# Patient Record
Sex: MALE | Race: WHITE | NOT HISPANIC OR LATINO | Employment: OTHER | URBAN - NONMETROPOLITAN AREA
[De-identification: names, ages, dates, MRNs, and addresses within clinical notes are randomized per-mention and may not be internally consistent; named-entity substitution may affect disease eponyms.]

---

## 2017-01-06 ENCOUNTER — OFFICE VISIT (OUTPATIENT)
Dept: UROLOGY | Facility: CLINIC | Age: 72
End: 2017-01-06

## 2017-01-06 VITALS
OXYGEN SATURATION: 96 % | RESPIRATION RATE: 16 BRPM | DIASTOLIC BLOOD PRESSURE: 87 MMHG | TEMPERATURE: 98 F | HEART RATE: 89 BPM | SYSTOLIC BLOOD PRESSURE: 128 MMHG

## 2017-01-06 DIAGNOSIS — C61 PROSTATE CANCER (HCC): Primary | ICD-10-CM

## 2017-01-06 LAB
BILIRUB BLD-MCNC: NEGATIVE MG/DL
CLARITY, POC: CLEAR
COLOR UR: YELLOW
GLUCOSE UR STRIP-MCNC: NEGATIVE MG/DL
KETONES UR QL: NEGATIVE
LEUKOCYTE EST, POC: NEGATIVE
NITRITE UR-MCNC: NEGATIVE MG/ML
PH UR: 6 [PH] (ref 5–8)
PROT UR STRIP-MCNC: NEGATIVE MG/DL
RBC # UR STRIP: NEGATIVE /UL
SP GR UR: 1.01 (ref 1–1.03)
UROBILINOGEN UR QL: NORMAL

## 2017-01-06 PROCEDURE — 99213 OFFICE O/P EST LOW 20 MIN: CPT | Performed by: UROLOGY

## 2017-01-06 PROCEDURE — 81003 URINALYSIS AUTO W/O SCOPE: CPT | Performed by: UROLOGY

## 2017-01-06 NOTE — PROGRESS NOTES
Chief Complaint  Prostate Cancer (Patient is here for a 6 month fup.)        TIFFANY Berg is a 71 y.o. male who returns today for semiannual follow-up with a known history of prostate cancer that is low-grade and diagnosed at an early stage and is untreated but is being managed with a program of active surveillance.  He has not had the routine follow-up biopsy gun but after taking Proscar to shrink the gland is PSA has dropped from 10-1.  He is always had a normal digital rectal exam and currently has minimal trouble voiding on Proscar and Flomax.  He does have some urgency associated with drinking large amounts of tea with caffeine.  I suggested reducing this rather than taking medication for her overactive bladder.    Vitals:    01/06/17 0900   BP: 128/87   Pulse: 89   Resp: 16   Temp: 98 °F (36.7 °C)   SpO2: 96%       Past Medical History  Past Medical History   Diagnosis Date   • Abnormal menses    • Acute asthma exacerbation    • Acute sinusitis    • Acute URI    • Allergic rhinitis    • Anemia    • Asthma    • Benign prostatic hypertrophy    • Deafness    • ED (erectile dysfunction)    • Family history of deafness and hearing loss    • GERD (gastroesophageal reflux disease)    • Hyperlipidemia    • Hypertension    • Obstructive sleep apnea    • Peripheral neuropathy    • Short of breath on exertion    • Skin cancer    • Type 2 diabetes mellitus    • Urinary frequency    • Vitamin B 12 deficiency    • Vitamin D deficiency        Past Surgical History  Past Surgical History   Procedure Laterality Date   • Appendectomy     • Knee arthroscopy Left    • Hand surgery         Medications  has a current medication list which includes the following prescription(s): accu-chek softclix lancets, albuterol, b-d single use swabs regular, alfuzosin, aspirin, blood gluc meter disp-strips, surechek control solution, carvedilol, vitamin d, chromium-cinnamon, cyanocobalamin, finasteride, glimepiride, glucosamine-chondroit-vit  c-mn, glucosamine-chondroitin, glucose blood, glucose blood, lisinopril, metformin, mirabegron er, multivitamin gummies adults, omeprazole, pneumococcal conj. 13-valent, sitagliptin, tamsulosin, terbinafine, welchol, and zoster vaccine live pf.      Allergies  Allergies   Allergen Reactions   • Statins Myalgia       Social History  Social History     Social History Narrative       Family History  He has no family history of bladder or kidney cancer  He has no family history of kidney stones      AUA Symptom Score:      Review of Systems  Review of Systems    Physical Exam  Physical Exam   Constitutional: He is oriented to person, place, and time. He appears well-developed and well-nourished.   HENT:   Head: Normocephalic and atraumatic.   Neck: Normal range of motion.   Pulmonary/Chest: Effort normal. No respiratory distress.   Abdominal: Soft. He exhibits no distension and no mass. There is no tenderness. No hernia.   Genitourinary: Rectum normal and prostate normal.   Musculoskeletal: Normal range of motion.   Lymphadenopathy:     He has no cervical adenopathy.   Neurological: He is alert and oriented to person, place, and time.   Skin: Skin is warm.   Psychiatric: He has a normal mood and affect. His behavior is normal.   Vitals reviewed.      Labs Recent and today in the office:  Results for orders placed or performed in visit on 01/06/17   POC Urinalysis Dipstick, Automated   Result Value Ref Range    Color Yellow Yellow, Straw, Dark Yellow, Mary    Clarity, UA Clear Clear    Glucose, UA Negative Negative, 1000 mg/dL (3+) mg/dL    Bilirubin Negative Negative    Ketones, UA Negative Negative    Specific Gravity  1.010 1.005 - 1.030    Blood, UA Negative Negative    pH, Urine 6.0 5.0 - 8.0    Protein, POC Negative Negative mg/dL    Urobilinogen, UA Normal Normal    Leukocytes Negative Negative    Nitrite, UA Negative Negative         Assessment & Plan  He'll reduce his caffeine continue the Proscar and Flomax  and return in 6 months with PSA.

## 2017-01-06 NOTE — MR AVS SNAPSHOT
Fazal Berg   1/6/2017 9:00 AM   Office Visit    Dept Phone:  [phone]   Encounter #:  [id number]    Provider:  Jeremy Lundberg MD   Department:  [facility] UROLOGY                Your Full Care Plan              Your Updated Medication List          This list is accurate as of: 1/6/17  9:22 AM.  Always use your most recent med list.                ACCU-CHEK SOFTCLIX LANCETS lancets   1 each by Other route 3 (three) times a day. Use as instructed       alfuzosin 10 MG 24 hr tablet   Commonly known as:  UROXATRAL       aspirin 81 MG EC tablet       B-D SINGLE USE SWABS REGULAR pads   1 swab 3 (three) times a day.       Blood Gluc Meter Disp-Strips device   1 strip 3 (three) times a day. Patient needs Accu-chek meter and test strips.       carvedilol 25 MG tablet   Commonly known as:  COREG   Take 1 tablet by mouth 2 (Two) Times a Day With Meals.       CINNAMON PLUS CHROMIUM 100-500 MCG-MG capsule   Generic drug:  Chromium-Cinnamon       Cyanocobalamin 2500 MCG sublingual tablet       finasteride 5 MG tablet   Commonly known as:  PROSCAR   Take 1 tablet by mouth daily.       glimepiride 4 MG tablet   Commonly known as:  AMARYL       GLUCOSAMINE 1500 COMPLEX PO       glucosamine-chondroitin 500-400 MG capsule capsule       * glucose blood test strip   1 each by Other route 3 (three) times a day. Use as instructed       * glucose blood test strip   Commonly known as:  ACCU-CHEK IDANIA   1 each by Other route 3 (three) times a day. Use as instructed       lisinopril 5 MG tablet   Commonly known as:  PRINIVIL,ZESTRIL       metFORMIN 500 MG tablet   Commonly known as:  GLUCOPHAGE   TAKE 1/2 TABLET BY MOUTH EVERY TWELVE HOURS       MULTIVITAMIN GUMMIES ADULTS chewable tablet       MYRBETRIQ 50 MG tablet sustained-release 24 hour   Generic drug:  Mirabegron ER       Omeprazole 20 MG tablet delayed-release       PREVNAR 13 vaccine   Generic drug:  pneumococcal conj. 13-valent        SITagliptin 100 MG tablet   Commonly known as:  JANUVIA   Take 1 tablet by mouth Daily.       SURECHEK CONTROL SOLUTION NORMAL liquid   1 bottle by In Vitro route every 30 (thirty) days.       tamsulosin 0.4 MG capsule 24 hr capsule   Commonly known as:  FLOMAX       terbinafine 250 MG tablet   Commonly known as:  LAMISIL   Take 1 tablet by mouth daily.       VENTOLIN  (90 BASE) MCG/ACT inhaler   Generic drug:  albuterol       Vitamin D 2000 UNITS tablet       WELCHOL 625 MG tablet   Generic drug:  colesevelam   TAKE 2 TABLETS TWICE DAILY       ZOSTAVAX 56818 UNT/0.65ML injection   Generic drug:  zoster vaccine live PF       * Notice:  This list has 2 medication(s) that are the same as other medications prescribed for you. Read the directions carefully, and ask your doctor or other care provider to review them with you.            We Performed the Following     POC Urinalysis Dipstick, Automated       You Were Diagnosed With        Codes Comments    Prostate cancer    -  Primary ICD-10-CM: C61  ICD-9-CM: 185       Instructions     None    Patient Instructions History      Upcoming Appointments     Visit Type Date Time Department    FOLLOW UP 2017  9:00 AM [facility]    OFFICE VISIT 2017  8:00 AM [facility]      [employer] Signup     [facility] [employer] allows you to send messages to your doctor, view your test results, renew your prescriptions, schedule appointments, and more. To sign up, go to [employer] and click on the Sign Up Now link in the New User? box. Enter your [employer] Activation Code exactly as it appears below along with the last four digits of your Social Security Number and your Date of Birth () to complete the sign-up process. If you do not sign up before the expiration date, you must request a new code.    [employer] Activation Code: [email or web address]  Expires: 2017  9:22 AM    If you have questions, you can email  [email or web address] or call [phone] to talk to our MyChart staff. Remember, [employer] is NOT to be used for urgent needs. For medical emergencies, dial 911.               Other Info from Your Visit           Your Appointments     Feb 08, 2017  8:00 AM EST   Office Visit with Matthew Carr MD   [facility] PRIMARY [facility] (--)    [address]  [place] [address] [phone]           Arrive 15 minutes prior to appointment.              Allergies     Statins  Myalgia      Reason for Visit     Prostate Cancer Patient is here for a 6 month fup.      Vital Signs     Blood Pressure Pulse Temperature Respirations Oxygen Saturation Smoking Status    128/87 89 98 °F (36.7 °C) (Temporal Artery ) 16 96% Never Smoker      Problems and Diagnoses Noted     Prostate cancer    -  Primary      Results     POC Urinalysis Dipstick, Automated      Component Value Standard Range & Units    Color Yellow Yellow, Straw, Dark Yellow, Mary    Clarity, UA Clear Clear    Glucose, UA Negative Negative, 1000 mg/dL (3+) mg/dL    Bilirubin Negative Negative    Ketones, UA Negative Negative    Specific Gravity  1.010 1.005 - 1.030    Blood, UA Negative Negative    pH, Urine 6.0 5.0 - 8.0    Protein, POC Negative Negative mg/dL    Urobilinogen, UA Normal Normal    Leukocytes Negative Negative    Nitrite, UA Negative Negative

## 2017-02-01 ENCOUNTER — LAB (OUTPATIENT)
Dept: FAMILY MEDICINE CLINIC | Facility: CLINIC | Age: 72
End: 2017-02-01

## 2017-02-01 DIAGNOSIS — E11.42 TYPE 2 DIABETES, CONTROLLED, WITH PERIPHERAL NEUROPATHY (HCC): ICD-10-CM

## 2017-02-01 DIAGNOSIS — I10 ESSENTIAL HYPERTENSION: ICD-10-CM

## 2017-02-01 DIAGNOSIS — R53.83 OTHER FATIGUE: ICD-10-CM

## 2017-02-01 DIAGNOSIS — R53.81 MALAISE: ICD-10-CM

## 2017-02-01 DIAGNOSIS — Z79.899 MEDICATION MANAGEMENT: ICD-10-CM

## 2017-02-01 DIAGNOSIS — E78.5 HYPERLIPIDEMIA, UNSPECIFIED HYPERLIPIDEMIA TYPE: Primary | ICD-10-CM

## 2017-02-01 DIAGNOSIS — E55.9 VITAMIN D DEFICIENCY: ICD-10-CM

## 2017-02-02 LAB
25(OH)D3+25(OH)D2 SERPL-MCNC: 29 NG/ML
ALBUMIN SERPL-MCNC: 4.3 G/DL (ref 3.2–4.8)
ALBUMIN/GLOB SERPL: 1.6 G/DL (ref 1.5–2.5)
ALP SERPL-CCNC: 50 U/L (ref 25–100)
ALT SERPL-CCNC: 18 U/L (ref 7–40)
AST SERPL-CCNC: 17 U/L (ref 0–33)
BILIRUB SERPL-MCNC: 0.8 MG/DL (ref 0.3–1.2)
BUN SERPL-MCNC: 19 MG/DL (ref 9–23)
BUN/CREAT SERPL: 17.3 (ref 7–25)
CALCIUM SERPL-MCNC: 10.6 MG/DL (ref 8.7–10.4)
CHLORIDE SERPL-SCNC: 100 MMOL/L (ref 99–109)
CHOLEST SERPL-MCNC: 209 MG/DL (ref 0–200)
CO2 SERPL-SCNC: 29 MMOL/L (ref 20–31)
CREAT SERPL-MCNC: 1.1 MG/DL (ref 0.6–1.3)
GLOBULIN SER CALC-MCNC: 2.7 GM/DL
GLUCOSE SERPL-MCNC: 160 MG/DL (ref 70–100)
HAV IGM SERPL QL IA: NEGATIVE
HBA1C MFR BLD: 8.5 % (ref 4.8–5.6)
HBV CORE IGM SERPL QL IA: NEGATIVE
HBV SURFACE AG SERPL QL IA: NEGATIVE
HCV AB S/CO SERPL IA: <0.1 S/CO RATIO (ref 0–0.9)
HDLC SERPL-MCNC: 42 MG/DL (ref 40–60)
LDLC SERPL CALC-MCNC: 101 MG/DL (ref 0–100)
MICROALBUMIN UR-MCNC: 17.5 UG/ML
POTASSIUM SERPL-SCNC: 4.7 MMOL/L (ref 3.5–5.5)
PROT SERPL-MCNC: 7 G/DL (ref 5.7–8.2)
SODIUM SERPL-SCNC: 138 MMOL/L (ref 132–146)
TRIGL SERPL-MCNC: 331 MG/DL (ref 0–150)
VLDLC SERPL CALC-MCNC: 66.2 MG/DL

## 2017-02-08 ENCOUNTER — OFFICE VISIT (OUTPATIENT)
Dept: FAMILY MEDICINE CLINIC | Facility: CLINIC | Age: 72
End: 2017-02-08

## 2017-02-08 VITALS
DIASTOLIC BLOOD PRESSURE: 87 MMHG | BODY MASS INDEX: 32.06 KG/M2 | SYSTOLIC BLOOD PRESSURE: 139 MMHG | TEMPERATURE: 97.8 F | WEIGHT: 229 LBS | HEIGHT: 71 IN | HEART RATE: 61 BPM | RESPIRATION RATE: 16 BRPM | OXYGEN SATURATION: 97 %

## 2017-02-08 DIAGNOSIS — E78.5 HYPERLIPIDEMIA, UNSPECIFIED HYPERLIPIDEMIA TYPE: ICD-10-CM

## 2017-02-08 DIAGNOSIS — G47.33 OBSTRUCTIVE SLEEP APNEA SYNDROME: ICD-10-CM

## 2017-02-08 DIAGNOSIS — E11.42 TYPE 2 DIABETES, CONTROLLED, WITH PERIPHERAL NEUROPATHY (HCC): ICD-10-CM

## 2017-02-08 DIAGNOSIS — J45.20 MILD INTERMITTENT ASTHMA WITHOUT COMPLICATION: ICD-10-CM

## 2017-02-08 DIAGNOSIS — I10 ESSENTIAL HYPERTENSION: Primary | ICD-10-CM

## 2017-02-08 PROCEDURE — 99214 OFFICE O/P EST MOD 30 MIN: CPT | Performed by: INTERNAL MEDICINE

## 2017-02-08 NOTE — PROGRESS NOTES
"Subjective   Patient ID: Fazal Berg is a 71 y.o. male Pt is here for management of multiple medical problems.  History of Present Illness  Pt is here for management of multiple medical problems.      Feel well. Breathing well.   Tolerating meds well.       The following portions of the patient's history were reviewed and updated as appropriate: allergies, current medications, past family history, past medical history, past social history, past surgical history and problem list.  Review of Systems   Constitutional: Negative for fatigue and fever.   HENT: Positive for congestion.    Respiratory: Negative for shortness of breath and wheezing.    All other systems reviewed and are negative.      Objective     Visit Vitals   • /87 (BP Location: Left arm, Patient Position: Sitting, Cuff Size: Adult)   • Pulse 61   • Temp 97.8 °F (36.6 °C) (Oral)   • Resp 16   • Ht 71\" (180.3 cm)   • Wt 229 lb (104 kg)   • SpO2 97%   • BMI 31.94 kg/m2     Physical Exam  General Appearance:    Alert, cooperative, no distress, appears stated age   Head:    Normocephalic, without obvious abnormality, atraumatic   Eyes:    PERRL, conjunctiva/corneas clear, EOM's intact           Ears:    Normal TM's and external ear canals, both ears   Nose:   Nares normal, septum midline, mucosa normal, no drainage    or sinus tenderness   Throat:   Narrowed oral air way. Lips, mucosa, and tongue normal; teeth and gums normal   Neck:   Supple, symmetrical, trachea midline, no adenopathy;        thyroid:  No enlargement/tenderness/nodules; no carotid    bruit or JVD   Back:     Symmetric, no curvature, ROM normal, no CVA tenderness   Lungs:     Clear to auscultation bilaterally, respirations unlabored   Chest wall:    No tenderness or deformity   Heart:    Regular rate and rhythm, S1 and S2 normal, no murmur, rub   or gallop   Abdomen:     Soft, non-tender, bowel sounds active all four quadrants,     no masses, no organomegaly           Extremities: "   Extremities normal, atraumatic, no cyanosis or edema   Pulses:   2+ and symmetric all extremities   Skin:   Skin color, texture, turgor normal, no rashes or lesions   Lymph nodes:   Cervical, supraclavicular, and axillary nodes normal   Neurologic:   CNII-XII intact. Normal strength, sensation and reflexes       throughout       Assessment/Plan     dmt2 worsening. Poor diet control and lack of exercise. Pt low motivation today to get going.   Memory get worse.   Pt states he cannot comply with treatment of gregory.      Fazal was seen today for follow-up.    Diagnoses and all orders for this visit:    Essential hypertension    Hyperlipidemia, unspecified hyperlipidemia type    Mild intermittent asthma without complication    Type 2 diabetes, controlled, with peripheral neuropathy    Other orders  -     metFORMIN (GLUCOPHAGE) 500 MG tablet; Take 1 tablet by mouth 2 (Two) Times a Day With Meals.      Return in about 4 months (around 6/8/2017).                   Patient Instructions   [facility] at [facility]

## 2017-03-14 RX ORDER — LISINOPRIL AND HYDROCHLOROTHIAZIDE 12.5; 1 MG/1; MG/1
TABLET ORAL
Qty: 90 TABLET | Refills: 3 | Status: SHIPPED | OUTPATIENT
Start: 2017-03-14 | End: 2018-02-07 | Stop reason: SDUPTHER

## 2017-03-14 RX ORDER — GLIMEPIRIDE 4 MG/1
TABLET ORAL
Qty: 90 TABLET | Refills: 3 | Status: SHIPPED | OUTPATIENT
Start: 2017-03-14 | End: 2018-04-24 | Stop reason: SDUPTHER

## 2017-06-01 LAB
25(OH)D3+25(OH)D2 SERPL-MCNC: 34.2 NG/ML
CHOLEST SERPL-MCNC: 194 MG/DL (ref 0–199)
HBA1C MFR BLD: 6.5 %
HDLC SERPL-MCNC: 49 MG/DL (ref 40–60)
LDLC SERPL CALC-MCNC: 83 MG/DL (ref 0–99)
TRIGL SERPL-MCNC: 310 MG/DL
VLDLC SERPL CALC-MCNC: 62 MG/DL

## 2017-06-09 ENCOUNTER — OFFICE VISIT (OUTPATIENT)
Dept: FAMILY MEDICINE CLINIC | Facility: CLINIC | Age: 72
End: 2017-06-09

## 2017-06-09 VITALS
DIASTOLIC BLOOD PRESSURE: 66 MMHG | RESPIRATION RATE: 16 BRPM | TEMPERATURE: 98.1 F | OXYGEN SATURATION: 99 % | HEIGHT: 71 IN | SYSTOLIC BLOOD PRESSURE: 143 MMHG | BODY MASS INDEX: 31.92 KG/M2 | HEART RATE: 60 BPM | WEIGHT: 228 LBS

## 2017-06-09 DIAGNOSIS — I10 ESSENTIAL HYPERTENSION: ICD-10-CM

## 2017-06-09 DIAGNOSIS — E78.5 HYPERLIPIDEMIA, UNSPECIFIED HYPERLIPIDEMIA TYPE: ICD-10-CM

## 2017-06-09 DIAGNOSIS — E11.42 TYPE 2 DIABETES, CONTROLLED, WITH PERIPHERAL NEUROPATHY (HCC): ICD-10-CM

## 2017-06-09 DIAGNOSIS — Z12.5 PROSTATE CANCER SCREENING: Primary | ICD-10-CM

## 2017-06-09 DIAGNOSIS — N42.9 DISORDER OF PROSTATE: ICD-10-CM

## 2017-06-09 PROCEDURE — G0438 PPPS, INITIAL VISIT: HCPCS | Performed by: INTERNAL MEDICINE

## 2017-06-09 PROCEDURE — 96160 PT-FOCUSED HLTH RISK ASSMT: CPT | Performed by: INTERNAL MEDICINE

## 2017-06-09 PROCEDURE — 99213 OFFICE O/P EST LOW 20 MIN: CPT | Performed by: INTERNAL MEDICINE

## 2017-06-09 PROCEDURE — 99397 PER PM REEVAL EST PAT 65+ YR: CPT | Performed by: INTERNAL MEDICINE

## 2017-06-09 NOTE — PROGRESS NOTES
"Subjective   Patient ID: Fazal Berg is a 72 y.o. male Pt is here for management of multiple medical problems.    Chief Complaint   Patient presents with   • Hypertension     4 month follow-up   • Dizziness     patient has dizziness at night when going to bed   • Sinusitis     patient states he thinks he has a sinus infection, he has had a runny nose, headacheand sinus pressure       History of Present Illness    Feels well.   Didn't take meds this am.       occ dizziness with laying down in bed. Thinks it is sinus related.      The following portions of the patient's history were reviewed and updated as appropriate: allergies, current medications, past family history, past medical history, past social history, past surgical history and problem list.      Review of Systems   Constitutional: Positive for fatigue.   Neurological: Positive for dizziness.   All other systems reviewed and are negative.      Objective     /66 (BP Location: Left arm, Patient Position: Sitting, Cuff Size: Large Adult)  Pulse 60  Temp 98.1 °F (36.7 °C) (Oral)   Resp 16  Ht 71\" (180.3 cm)  Wt 228 lb (103 kg)  SpO2 99%  BMI 31.8 kg/m2  Physical Exam  General Appearance:    Alert, cooperative, no distress, appears stated age   Head:    Normocephalic, without obvious abnormality, atraumatic   Eyes:    PERRL, conjunctiva/corneas clear, EOM's intact           Ears:    Normal TM's and external ear canals, both ears   Nose:   Nares normal, septum midline, mucosa normal, no drainage    or sinus tenderness   Throat:   Lips, mucosa, and tongue normal; teeth and gums normal   Neck:   Supple, symmetrical, trachea midline, no adenopathy;        thyroid:  No enlargement/tenderness/nodules; no carotid    bruit or JVD   Back:     Symmetric, no curvature, ROM normal, no CVA tenderness   Lungs:     Clear to auscultation bilaterally, respirations unlabored   Chest wall:    No tenderness or deformity   Heart:    Regular rate and rhythm, S1 and " S2 normal, no murmur, rub   or gallop   Abdomen:     Soft, non-tender, bowel sounds active all four quadrants,     no masses, no organomegaly           Extremities:   Extremities normal, atraumatic, no cyanosis or edema   Pulses:   2+ and symmetric all extremities   Skin:   Skin color, texture, turgor normal, no rashes or lesions   Lymph nodes:   Cervical, supraclavicular, and axillary nodes normal   Neurologic:   CNII-XII intact. Normal strength, sensation and reflexes       throughout       Assessment/Plan       ha1c much improved.     Labs discussed.      Fazal was seen today for hypertension, dizziness and sinusitis.    Diagnoses and all orders for this visit:    Prostate cancer screening  -     PSA  -     Hemoglobin A1c  -     TSH  -     T4, Free  -     Hemoglobin A1c  -     Comprehensive Metabolic Panel  -     CBC & Differential    Type 2 diabetes, controlled, with peripheral neuropathy  -     PSA  -     Hemoglobin A1c  -     TSH  -     T4, Free  -     Hemoglobin A1c  -     Comprehensive Metabolic Panel  -     CBC & Differential    Essential hypertension  -     PSA  -     Hemoglobin A1c  -     TSH  -     T4, Free  -     Hemoglobin A1c  -     Comprehensive Metabolic Panel  -     CBC & Differential    Hyperlipidemia, unspecified hyperlipidemia type  -     PSA  -     Hemoglobin A1c  -     TSH  -     T4, Free  -     Hemoglobin A1c  -     Comprehensive Metabolic Panel  -     CBC & Differential    Disorder of prostate   -     PSA      Return in about 5 months (around 11/9/2017).                   There are no Patient Instructions on file for this visit.

## 2017-06-09 NOTE — PROGRESS NOTES
QUICK REFERENCE INFORMATION:  The ABCs of the Annual Wellness Visit    Initial Medicare Wellness Visit    HEALTH RISK ASSESSMENT    1945    Recent Hospitalizations:  No hospitalization(s) within the last year..        Current Medical Providers:  Patient Care Team:  Matthew Carr MD as PCP - General        Smoking Status:  History   Smoking Status   • Never Smoker   Smokeless Tobacco   • Never Used       Alcohol Consumption:  History   Alcohol Use No       Depression Screen:   No flowsheet data found.    Health Habits and Functional and Cognitive Screening:  No flowsheet data found.               Does the patient have evidence of cognitive impairment? No    Asiprin use counseling: Taking ASA appropriately as indicated      Recent Lab Results:    Visual Acuity:  No exam data present    Age-appropriate Screening Schedule:  Refer to the list below for future screening recommendations based on patient's age, sex and/or medical conditions. Orders for these recommended tests are listed in the plan section. The patient has been provided with a written plan.    Health Maintenance   Topic Date Due   • PNEUMOCOCCAL VACCINES (65+ LOW/MEDIUM RISK) (2 of 2 - PPSV23) 10/11/2016   • INFLUENZA VACCINE  08/01/2017   • DIABETIC FOOT EXAM  09/08/2017   • DIABETIC EYE EXAM  10/07/2017   • HEMOGLOBIN A1C  12/01/2017   • URINE MICROALBUMIN  02/01/2018   • LIPID PANEL  06/01/2018   • COLONOSCOPY  11/07/2026   • TDAP/TD VACCINES (2 - Td) 02/09/2027   • ZOSTER VACCINE  Completed        Subjective   History of Present Illness    Fazal Berg is a 72 y.o. male who presents for an Annual Wellness Visit.    The following portions of the patient's history were reviewed and updated as appropriate: allergies, current medications, past family history, past medical history, past social history, past surgical history and problem list.    Outpatient Medications Prior to Visit   Medication Sig Dispense Refill   • ACCU-CHEK SOFTCLIX LANCETS  lancets 1 each by Other route 3 (three) times a day. Use as instructed 300 each 3   • albuterol (VENTOLIN HFA) 108 (90 BASE) MCG/ACT inhaler Ventolin  (90 Base) MCG/ACT Inhalation Aerosol Solution; Patient Sig: Ventolin  (90 Base) MCG/ACT Inhalation Aerosol Solution INHALE 1 TO 2 PUFFS EVERY 4 TO 6 HOURS AS NEEDED.; 1; 11; 18-Aug-2015; Active     • Alcohol Swabs (B-D SINGLE USE SWABS REGULAR) pads 1 swab 3 (three) times a day. 90 each 3   • alfuzosin (UROXATRAL) 10 MG 24 hr tablet Take  by mouth.     • aspirin 81 MG EC tablet Take  by mouth daily.     • Blood Gluc Meter Disp-Strips device 1 strip 3 (three) times a day. Patient needs Accu-chek meter and test strips. 1 each 0   • Blood Glucose Calibration (SURECHEK CONTROL SOLUTION) NORMAL liquid 1 bottle by In Vitro route every 30 (thirty) days. 3 each 3   • carvedilol (COREG) 25 MG tablet Take 1 tablet by mouth 2 (Two) Times a Day With Meals. 180 tablet 3   • Cholecalciferol (VITAMIN D) 2000 UNITS tablet Take 1 capsule by mouth 3 (Three) Times a Week.     • Chromium-Cinnamon (CINNAMON PLUS CHROMIUM) 100-500 MCG-MG capsule Take 2 capsules by mouth 2 (two) times a day.     • Cyanocobalamin 2500 MCG sublingual tablet Place  under the tongue 3 (Three) Times a Week.     • finasteride (PROSCAR) 5 MG tablet Take 1 tablet by mouth daily. 90 tablet 3   • glimepiride (AMARYL) 4 MG tablet TAKE 1 TABLET EVERY DAY 90 tablet 3   • Glucosamine-Chondroit-Vit C-Mn (GLUCOSAMINE 1500 COMPLEX PO) Take  by mouth daily.     • glucosamine-chondroitin 500-400 MG capsule capsule Take  by mouth 3 (three) times a day with meals.     • lisinopril-hydrochlorothiazide (PRINZIDE,ZESTORETIC) 10-12.5 MG per tablet TAKE 1 TABLET EVERY DAY 90 tablet 3   • metFORMIN (GLUCOPHAGE) 500 MG tablet Take 1 tablet by mouth 2 (Two) Times a Day With Meals. 60 tablet 10   • Mirabegron ER (MYRBETRIQ) 50 MG tablet sustained-release 24 hour Take  by mouth.     • Multiple Vitamins-Minerals  (MULTIVITAMIN GUMMIES ADULTS) chewable tablet Chew daily.     • Omeprazole 20 MG tablet delayed-release Take  by mouth.     • SITagliptin (JANUVIA) 100 MG tablet Take 1 tablet by mouth Daily. 28 tablet 0   • tamsulosin (FLOMAX) 0.4 MG capsule 24 hr capsule Take 1 capsule by mouth.     • terbinafine (LAMISIL) 250 MG tablet Take 1 tablet by mouth daily. 30 tablet 2   • WELCHOL 625 MG tablet TAKE 2 TABLETS TWICE DAILY 360 tablet 3   • glucose blood (ACCU-CHEK IDANIA) test strip 1 each by Other route 3 (three) times a day. Use as instructed 90 each 3   • glucose blood test strip 1 each by Other route 3 (three) times a day. Use as instructed 300 each 3   • lisinopril (PRINIVIL,ZESTRIL) 5 MG tablet Take 5 mg by mouth daily.       No facility-administered medications prior to visit.        Patient Active Problem List   Diagnosis   • Menstrual disorder   • Asthma with acute exacerbation   • Acute sinusitis   • Acute upper respiratory infection   • Asthma   • Benign prostatic hyperplasia   • Gastroesophageal reflux disease   • Hyperlipidemia   • Hypertension   • Obstructive sleep apnea syndrome   • Peripheral neuropathy   • Dyspnea on exertion   • Malignant neoplasm of skin   • Type 2 diabetes, controlled, with peripheral neuropathy   • Cobalamin deficiency   • Vitamin D deficiency   • Skin lesion of face   • Arthritis       Advance Care Planning:  has an advance directive - a copy HAS NOT been provided. Have asked the patient to send this to us to add to record.    Identification of Risk Factors:  Risk factors include: weight , inactivity, increased fall risk and polypharmacy.    Review of Systems    Compared to one year ago, the patient feels his physical health is the same.  Compared to one year ago, the patient feels his mental health is the same.    Objective     Physical Exam    Vitals:    06/09/17 0817   BP: 143/66   BP Location: Left arm   Patient Position: Sitting   Cuff Size: Large Adult   Pulse: 60   Resp: 16  "  Temp: 98.1 °F (36.7 °C)   TempSrc: Oral   SpO2: 99%   Weight: 228 lb (103 kg)   Height: 71\" (180.3 cm)       Body mass index is 31.8 kg/(m^2).  Discussed the patient's BMI with him. The BMI is above average; BMI management plan is completed.    Assessment/Plan   Patient Self-Management and Personalized Health Advice  The patient has been provided with information about: diet and preventive services including:   · Advance directive, Fall Risk assessment done, Fall Risk plan of care done, Prostate cancer screening discussed.    Visit Diagnoses:    ICD-10-CM ICD-9-CM   1. Prostate cancer screening Z12.5 V76.44   2. Type 2 diabetes, controlled, with peripheral neuropathy E11.42 250.60     357.2   3. Essential hypertension I10 401.9   4. Hyperlipidemia, unspecified hyperlipidemia type E78.5 272.4   5. Disorder of prostate  N42.9 602.9       Orders Placed This Encounter   Procedures   • PSA   • Hemoglobin A1c   • TSH   • T4, Free   • Hemoglobin A1c   • Comprehensive Metabolic Panel   • CBC & Differential     Order Specific Question:   Manual Differential     Answer:   No       Outpatient Encounter Prescriptions as of 6/9/2017   Medication Sig Dispense Refill   • ACCU-CHEK SOFTCLIX LANCETS lancets 1 each by Other route 3 (three) times a day. Use as instructed 300 each 3   • albuterol (VENTOLIN HFA) 108 (90 BASE) MCG/ACT inhaler Ventolin  (90 Base) MCG/ACT Inhalation Aerosol Solution; Patient Sig: Ventolin  (90 Base) MCG/ACT Inhalation Aerosol Solution INHALE 1 TO 2 PUFFS EVERY 4 TO 6 HOURS AS NEEDED.; 1; 11; 18-Aug-2015; Active     • Alcohol Swabs (B-D SINGLE USE SWABS REGULAR) pads 1 swab 3 (three) times a day. 90 each 3   • alfuzosin (UROXATRAL) 10 MG 24 hr tablet Take  by mouth.     • aspirin 81 MG EC tablet Take  by mouth daily.     • Blood Gluc Meter Disp-Strips device 1 strip 3 (three) times a day. Patient needs Accu-chek meter and test strips. 1 each 0   • Blood Glucose Calibration (SURECHEK CONTROL " SOLUTION) NORMAL liquid 1 bottle by In Vitro route every 30 (thirty) days. 3 each 3   • carvedilol (COREG) 25 MG tablet Take 1 tablet by mouth 2 (Two) Times a Day With Meals. 180 tablet 3   • Cholecalciferol (VITAMIN D) 2000 UNITS tablet Take 1 capsule by mouth 3 (Three) Times a Week.     • Chromium-Cinnamon (CINNAMON PLUS CHROMIUM) 100-500 MCG-MG capsule Take 2 capsules by mouth 2 (two) times a day.     • Cyanocobalamin 2500 MCG sublingual tablet Place  under the tongue 3 (Three) Times a Week.     • finasteride (PROSCAR) 5 MG tablet Take 1 tablet by mouth daily. 90 tablet 3   • glimepiride (AMARYL) 4 MG tablet TAKE 1 TABLET EVERY DAY 90 tablet 3   • Glucosamine-Chondroit-Vit C-Mn (GLUCOSAMINE 1500 COMPLEX PO) Take  by mouth daily.     • glucosamine-chondroitin 500-400 MG capsule capsule Take  by mouth 3 (three) times a day with meals.     • lisinopril-hydrochlorothiazide (PRINZIDE,ZESTORETIC) 10-12.5 MG per tablet TAKE 1 TABLET EVERY DAY 90 tablet 3   • metFORMIN (GLUCOPHAGE) 500 MG tablet Take 1 tablet by mouth 2 (Two) Times a Day With Meals. 60 tablet 10   • Mirabegron ER (MYRBETRIQ) 50 MG tablet sustained-release 24 hour Take  by mouth.     • Multiple Vitamins-Minerals (MULTIVITAMIN GUMMIES ADULTS) chewable tablet Chew daily.     • Omeprazole 20 MG tablet delayed-release Take  by mouth.     • SITagliptin (JANUVIA) 100 MG tablet Take 1 tablet by mouth Daily. 28 tablet 0   • tamsulosin (FLOMAX) 0.4 MG capsule 24 hr capsule Take 1 capsule by mouth.     • terbinafine (LAMISIL) 250 MG tablet Take 1 tablet by mouth daily. 30 tablet 2   • WELCHOL 625 MG tablet TAKE 2 TABLETS TWICE DAILY 360 tablet 3   • glucose blood (ACCU-CHEK IDANIA) test strip 1 each by Other route 3 (three) times a day. Use as instructed 90 each 3   • glucose blood test strip 1 each by Other route 3 (three) times a day. Use as instructed 300 each 3   • [DISCONTINUED] lisinopril (PRINIVIL,ZESTRIL) 5 MG tablet Take 5 mg by mouth daily.       No  facility-administered encounter medications on file as of 6/9/2017.        Reviewed use of high risk medication in the elderly: yes  Reviewed for potential of harmful drug interactions in the elderly: yes    Follow Up:  Return in about 5 months (around 11/9/2017).     An After Visit Summary and PPPS with all of these plans were given to the patient.

## 2017-07-06 ENCOUNTER — LAB (OUTPATIENT)
Dept: UROLOGY | Facility: CLINIC | Age: 72
End: 2017-07-06

## 2017-07-06 DIAGNOSIS — R35.1 BPH ASSOCIATED WITH NOCTURIA: ICD-10-CM

## 2017-07-06 DIAGNOSIS — N40.1 BPH ASSOCIATED WITH NOCTURIA: ICD-10-CM

## 2017-07-06 DIAGNOSIS — Z87.438 HISTORY OF BPH: Primary | ICD-10-CM

## 2017-07-06 LAB — PSA SERPL-MCNC: 1.27 NG/ML (ref 0.06–4)

## 2017-07-07 ENCOUNTER — OFFICE VISIT (OUTPATIENT)
Dept: UROLOGY | Facility: CLINIC | Age: 72
End: 2017-07-07

## 2017-07-07 DIAGNOSIS — N40.1 BPH (BENIGN PROSTATIC HYPERTROPHY) WITH URINARY OBSTRUCTION: ICD-10-CM

## 2017-07-07 DIAGNOSIS — C61 PROSTATE CANCER (HCC): Primary | ICD-10-CM

## 2017-07-07 DIAGNOSIS — N13.8 BPH (BENIGN PROSTATIC HYPERTROPHY) WITH URINARY OBSTRUCTION: ICD-10-CM

## 2017-07-07 LAB
BILIRUB BLD-MCNC: NEGATIVE MG/DL
CLARITY, POC: CLEAR
COLOR UR: YELLOW
GLUCOSE UR STRIP-MCNC: NEGATIVE MG/DL
KETONES UR QL: NEGATIVE
LEUKOCYTE EST, POC: NEGATIVE
NITRITE UR-MCNC: NEGATIVE MG/ML
PH UR: 6 [PH] (ref 5–8)
PROT UR STRIP-MCNC: NEGATIVE MG/DL
RBC # UR STRIP: NEGATIVE /UL
SP GR UR: 1.03 (ref 1–1.03)
UROBILINOGEN UR QL: NORMAL

## 2017-07-07 PROCEDURE — 81003 URINALYSIS AUTO W/O SCOPE: CPT | Performed by: UROLOGY

## 2017-07-07 PROCEDURE — 99213 OFFICE O/P EST LOW 20 MIN: CPT | Performed by: UROLOGY

## 2017-07-07 NOTE — PROGRESS NOTES
Chief Complaint  Prostate Cancer (6 months fup.)        TIFFANY Berg is a 72 y.o. male who returns today for semiannual follow-up with early stage low-grade prostate cancer being followed on a program of active surveillance.  His PSA has dropped from 10-1 on Proscar to shrink the enlarged gland.  He no longer takes Flomax since he voids without difficulty.    There were no vitals filed for this visit.    Past Medical History  Past Medical History:   Diagnosis Date   • Abnormal menses    • Acute asthma exacerbation    • Acute sinusitis    • Acute URI    • Allergic rhinitis    • Anemia    • Asthma    • Benign prostatic hypertrophy    • Deafness    • ED (erectile dysfunction)    • Family history of deafness and hearing loss    • GERD (gastroesophageal reflux disease)    • Hyperlipidemia    • Hypertension    • Obstructive sleep apnea    • Peripheral neuropathy    • Short of breath on exertion    • Skin cancer    • Type 2 diabetes mellitus    • Urinary frequency    • Vitamin B 12 deficiency    • Vitamin D deficiency        Past Surgical History  Past Surgical History:   Procedure Laterality Date   • APPENDECTOMY     • HAND SURGERY     • KNEE ARTHROSCOPY Left        Medications  has a current medication list which includes the following prescription(s): accu-chek softclix lancets, albuterol, b-d single use swabs regular, alfuzosin, aspirin, blood gluc meter disp-strips, surechek control solution, carvedilol, vitamin d, chromium-cinnamon, cyanocobalamin, finasteride, glimepiride, glucosamine-chondroit-vit c-mn, glucosamine-chondroitin, glucose blood, glucose blood, lisinopril-hydrochlorothiazide, metformin, mirabegron er, multivitamin gummies adults, omeprazole, sitagliptin, tamsulosin, terbinafine, and welchol.      Allergies  Allergies   Allergen Reactions   • Statins Myalgia       Social History  Social History     Social History Narrative       Family History  He has no family history of bladder or kidney cancer  He  has no family history of kidney stones      AUA Symptom Score:      Review of Systems  Review of Systems    Physical Exam  Physical Exam   Constitutional: He is oriented to person, place, and time. He appears well-developed and well-nourished.   HENT:   Head: Normocephalic and atraumatic.   Neck: Normal range of motion.   Pulmonary/Chest: Effort normal. No respiratory distress.   Abdominal: Soft. He exhibits no distension and no mass. There is no tenderness. No hernia.   Genitourinary: Rectum normal and prostate normal.   Musculoskeletal: Normal range of motion.   Lymphadenopathy:     He has no cervical adenopathy.   Neurological: He is alert and oriented to person, place, and time.   Skin: Skin is warm and dry.   Psychiatric: He has a normal mood and affect. His behavior is normal.   Vitals reviewed.      Labs Recent and today in the office:  Results for orders placed or performed in visit on 07/07/17   POC Urinalysis Dipstick, Automated   Result Value Ref Range    Color Yellow Yellow, Straw, Dark Yellow, Mary    Clarity, UA Clear Clear    Glucose, UA Negative Negative, 1000 mg/dL (3+) mg/dL    Bilirubin Negative Negative    Ketones, UA Negative Negative    Specific Gravity  1.030 1.005 - 1.030    Blood, UA Negative Negative    pH, Urine 6.0 5.0 - 8.0    Protein, POC Negative Negative mg/dL    Urobilinogen, UA Normal Normal    Leukocytes Negative Negative    Nitrite, UA Negative Negative         Assessment & Plan  He is instructed to continue the Proscar and eat a heart healthy diet to lower the risk of progression.  Digital rectal exam is benign today so he can return in 6 months with PSA.

## 2017-09-21 DIAGNOSIS — N40.1 BENIGN NON-NODULAR PROSTATIC HYPERPLASIA WITH LOWER URINARY TRACT SYMPTOMS: ICD-10-CM

## 2017-09-21 RX ORDER — FINASTERIDE 5 MG/1
TABLET, FILM COATED ORAL
Qty: 90 TABLET | Refills: 2 | Status: SHIPPED | OUTPATIENT
Start: 2017-09-21 | End: 2017-09-27 | Stop reason: SDUPTHER

## 2017-09-27 DIAGNOSIS — N40.1 BENIGN NON-NODULAR PROSTATIC HYPERPLASIA WITH LOWER URINARY TRACT SYMPTOMS: ICD-10-CM

## 2017-09-27 RX ORDER — FINASTERIDE 5 MG/1
5 TABLET, FILM COATED ORAL DAILY
Qty: 90 TABLET | Refills: 2 | Status: SHIPPED | OUTPATIENT
Start: 2017-09-27 | End: 2017-09-28 | Stop reason: SDUPTHER

## 2017-09-28 DIAGNOSIS — N40.1 BENIGN NON-NODULAR PROSTATIC HYPERPLASIA WITH LOWER URINARY TRACT SYMPTOMS: ICD-10-CM

## 2017-09-28 RX ORDER — FINASTERIDE 5 MG/1
5 TABLET, FILM COATED ORAL DAILY
Qty: 90 TABLET | Refills: 2 | Status: SHIPPED | OUTPATIENT
Start: 2017-09-28 | End: 2018-10-18 | Stop reason: SDUPTHER

## 2017-11-06 DIAGNOSIS — E11.42 TYPE 2 DIABETES, CONTROLLED, WITH PERIPHERAL NEUROPATHY (HCC): ICD-10-CM

## 2017-11-06 LAB
ALBUMIN SERPL-MCNC: 4.1 G/DL (ref 3.5–5)
ALBUMIN/GLOB SERPL: 1.5 G/DL (ref 1–2)
ALP SERPL-CCNC: 48 U/L (ref 38–126)
ALT SERPL-CCNC: 31 U/L (ref 13–69)
AST SERPL-CCNC: 22 U/L (ref 15–46)
BASOPHILS # BLD AUTO: 0.07 10*3/MM3 (ref 0–0.2)
BASOPHILS NFR BLD AUTO: 1.2 % (ref 0–2.5)
BILIRUB SERPL-MCNC: 0.6 MG/DL (ref 0.2–1.3)
BUN SERPL-MCNC: 22 MG/DL (ref 7–20)
BUN/CREAT SERPL: 20 (ref 6.3–21.9)
CALCIUM SERPL-MCNC: 10.6 MG/DL (ref 8.4–10.2)
CHLORIDE SERPL-SCNC: 103 MMOL/L (ref 98–107)
CO2 SERPL-SCNC: 27 MMOL/L (ref 26–30)
CREAT SERPL-MCNC: 1.1 MG/DL (ref 0.6–1.3)
EOSINOPHIL # BLD AUTO: 0.32 10*3/MM3 (ref 0–0.7)
EOSINOPHIL NFR BLD AUTO: 5.7 % (ref 0–7)
ERYTHROCYTE [DISTWIDTH] IN BLOOD BY AUTOMATED COUNT: 11.9 % (ref 11.5–14.5)
GFR SERPLBLD CREATININE-BSD FMLA CKD-EPI: 66 ML/MIN/1.73
GFR SERPLBLD CREATININE-BSD FMLA CKD-EPI: 80 ML/MIN/1.73
GLOBULIN SER CALC-MCNC: 2.7 GM/DL
GLUCOSE SERPL-MCNC: 144 MG/DL (ref 74–98)
HBA1C MFR BLD: 7.3 %
HCT VFR BLD AUTO: 40.2 % (ref 42–52)
HGB BLD-MCNC: 13.8 G/DL (ref 14–18)
IMM GRANULOCYTES # BLD: 0.02 10*3/MM3 (ref 0–0.06)
IMM GRANULOCYTES NFR BLD: 0.4 % (ref 0–0.6)
LYMPHOCYTES # BLD AUTO: 1.75 10*3/MM3 (ref 0.6–3.4)
LYMPHOCYTES NFR BLD AUTO: 31.1 % (ref 10–50)
MCH RBC QN AUTO: 31.9 PG (ref 27–31)
MCHC RBC AUTO-ENTMCNC: 34.3 G/DL (ref 30–37)
MCV RBC AUTO: 93.1 FL (ref 80–94)
MONOCYTES # BLD AUTO: 0.63 10*3/MM3 (ref 0–0.9)
MONOCYTES NFR BLD AUTO: 11.2 % (ref 0–12)
NEUTROPHILS # BLD AUTO: 2.84 10*3/MM3 (ref 2–6.9)
NEUTROPHILS NFR BLD AUTO: 50.4 % (ref 37–80)
NRBC BLD AUTO-RTO: 0 /100 WBC (ref 0–0)
PLATELET # BLD AUTO: 197 10*3/MM3 (ref 130–400)
POTASSIUM SERPL-SCNC: 5 MMOL/L (ref 3.5–5.1)
PROT SERPL-MCNC: 6.8 G/DL (ref 6.3–8.2)
PSA SERPL-MCNC: 1.15 NG/ML (ref 0.06–4)
RBC # BLD AUTO: 4.32 10*6/MM3 (ref 4.7–6.1)
SODIUM SERPL-SCNC: 140 MMOL/L (ref 137–145)
T4 FREE SERPL-MCNC: 1.02 NG/DL (ref 0.78–2.19)
TSH SERPL DL<=0.005 MIU/L-ACNC: 0.74 MIU/ML (ref 0.47–4.68)
WBC # BLD AUTO: 5.63 10*3/MM3 (ref 4.8–10.8)

## 2017-11-09 ENCOUNTER — OFFICE VISIT (OUTPATIENT)
Dept: FAMILY MEDICINE CLINIC | Facility: CLINIC | Age: 72
End: 2017-11-09

## 2017-11-09 VITALS
BODY MASS INDEX: 32.06 KG/M2 | HEIGHT: 71 IN | OXYGEN SATURATION: 98 % | TEMPERATURE: 98.4 F | HEART RATE: 73 BPM | SYSTOLIC BLOOD PRESSURE: 134 MMHG | DIASTOLIC BLOOD PRESSURE: 55 MMHG | WEIGHT: 229 LBS

## 2017-11-09 DIAGNOSIS — E11.42 TYPE 2 DIABETES, CONTROLLED, WITH PERIPHERAL NEUROPATHY (HCC): ICD-10-CM

## 2017-11-09 DIAGNOSIS — Z23 NEED FOR INFLUENZA VACCINATION: ICD-10-CM

## 2017-11-09 DIAGNOSIS — I10 ESSENTIAL HYPERTENSION: Primary | ICD-10-CM

## 2017-11-09 DIAGNOSIS — E53.8 COBALAMIN DEFICIENCY: ICD-10-CM

## 2017-11-09 DIAGNOSIS — E78.5 HYPERLIPIDEMIA, UNSPECIFIED HYPERLIPIDEMIA TYPE: ICD-10-CM

## 2017-11-09 PROCEDURE — 90662 IIV NO PRSV INCREASED AG IM: CPT | Performed by: INTERNAL MEDICINE

## 2017-11-09 PROCEDURE — G0008 ADMIN INFLUENZA VIRUS VAC: HCPCS | Performed by: INTERNAL MEDICINE

## 2017-11-09 PROCEDURE — 99214 OFFICE O/P EST MOD 30 MIN: CPT | Performed by: INTERNAL MEDICINE

## 2017-11-09 NOTE — PROGRESS NOTES
Subjective     Patient ID: Fazal Berg is a 72 y.o. male. Patient is here for management of multiple medical problems.     Chief Complaint   Patient presents with   • Diabetes     lab results     Diabetes   He presents for his follow-up diabetic visit. He has type 2 diabetes mellitus. His disease course has been worsening. Pertinent negatives for hypoglycemia include no confusion, dizziness, headaches, hunger or nervousness/anxiousness. Pertinent negatives for diabetes include no blurred vision, no chest pain and no fatigue. Pertinent negatives for hypoglycemia complications include no blackouts and no hospitalization. Pertinent negatives for diabetic complications include no autonomic neuropathy or retinopathy. Risk factors for coronary artery disease include hypertension, male sex, diabetes mellitus and dyslipidemia. Current diabetic treatment includes oral agent (triple therapy). He is compliant with treatment all of the time. When asked about meal planning, he reported none. He has not had a previous visit with a dietitian. He participates in exercise intermittently. His home blood glucose trend is increasing steadily. His breakfast blood glucose range is generally 130-140 mg/dl. His lunch blood glucose range is generally 130-140 mg/dl. His dinner blood glucose range is generally 130-140 mg/dl. His highest blood glucose is 130-140 mg/dl. His overall blood glucose range is 130-140 mg/dl. An ACE inhibitor/angiotensin II receptor blocker is being taken.        Numbness in feet.  Hx of callous formation and broke open in past per pt report.    BS running ok but not checking often.       The following portions of the patient's history were reviewed and updated as appropriate: allergies, current medications, past family history, past medical history, past social history, past surgical history and problem list.    Review of Systems   Constitutional: Negative for diaphoresis and fatigue.   Eyes: Negative for blurred  vision.   Respiratory: Negative for cough and shortness of breath.    Cardiovascular: Negative for chest pain.   Musculoskeletal: Negative for arthralgias, joint swelling and neck pain.   Neurological: Negative for dizziness and headaches.   Psychiatric/Behavioral: Negative for confusion. The patient is not nervous/anxious.    All other systems reviewed and are negative.      Current Outpatient Prescriptions:   •  ACCU-CHEK SOFTCLIX LANCETS lancets, 1 each by Other route 3 (three) times a day. Use as instructed, Disp: 300 each, Rfl: 3  •  albuterol (VENTOLIN HFA) 108 (90 BASE) MCG/ACT inhaler, Ventolin  (90 Base) MCG/ACT Inhalation Aerosol Solution; Patient Sig: Ventolin  (90 Base) MCG/ACT Inhalation Aerosol Solution INHALE 1 TO 2 PUFFS EVERY 4 TO 6 HOURS AS NEEDED.; 1; 11; 18-Aug-2015; Active, Disp: , Rfl:   •  Alcohol Swabs (B-D SINGLE USE SWABS REGULAR) pads, 1 swab 3 (three) times a day., Disp: 90 each, Rfl: 3  •  alfuzosin (UROXATRAL) 10 MG 24 hr tablet, Take  by mouth., Disp: , Rfl:   •  aspirin 81 MG EC tablet, Take  by mouth daily., Disp: , Rfl:   •  Blood Gluc Meter Disp-Strips device, 1 strip 3 (three) times a day. Patient needs Accu-chek meter and test strips., Disp: 1 each, Rfl: 0  •  Blood Glucose Calibration (SURECHEK CONTROL SOLUTION) NORMAL liquid, 1 bottle by In Vitro route every 30 (thirty) days., Disp: 3 each, Rfl: 3  •  carvedilol (COREG) 25 MG tablet, Take 1 tablet by mouth 2 (Two) Times a Day With Meals., Disp: 180 tablet, Rfl: 3  •  Cholecalciferol (VITAMIN D) 2000 UNITS tablet, Take 1 capsule by mouth 3 (Three) Times a Week., Disp: , Rfl:   •  Chromium-Cinnamon (CINNAMON PLUS CHROMIUM) 100-500 MCG-MG capsule, Take 2 capsules by mouth 2 (two) times a day., Disp: , Rfl:   •  Cyanocobalamin 2500 MCG sublingual tablet, Place  under the tongue 3 (Three) Times a Week., Disp: , Rfl:   •  finasteride (PROSCAR) 5 MG tablet, Take 1 tablet by mouth Daily., Disp: 90 tablet, Rfl: 2  •   "glimepiride (AMARYL) 4 MG tablet, TAKE 1 TABLET EVERY DAY, Disp: 90 tablet, Rfl: 3  •  Glucosamine-Chondroit-Vit C-Mn (GLUCOSAMINE 1500 COMPLEX PO), Take  by mouth daily., Disp: , Rfl:   •  glucosamine-chondroitin 500-400 MG capsule capsule, Take  by mouth 3 (three) times a day with meals., Disp: , Rfl:   •  glucose blood (ACCU-CHEK IDANIA) test strip, 1 each by Other route 3 (three) times a day. Use as instructed, Disp: 90 each, Rfl: 3  •  glucose blood test strip, 1 each by Other route 3 (three) times a day. Use as instructed, Disp: 300 each, Rfl: 3  •  lisinopril-hydrochlorothiazide (PRINZIDE,ZESTORETIC) 10-12.5 MG per tablet, TAKE 1 TABLET EVERY DAY, Disp: 90 tablet, Rfl: 3  •  metFORMIN (GLUCOPHAGE) 500 MG tablet, Take 2 tablets by mouth 2 (Two) Times a Day With Meals., Disp: 180 tablet, Rfl: 11  •  Mirabegron ER (MYRBETRIQ) 50 MG tablet sustained-release 24 hour, Take  by mouth., Disp: , Rfl:   •  Multiple Vitamins-Minerals (MULTIVITAMIN GUMMIES ADULTS) chewable tablet, Chew daily., Disp: , Rfl:   •  Omeprazole 20 MG tablet delayed-release, Take  by mouth., Disp: , Rfl:   •  SITagliptin (JANUVIA) 100 MG tablet, Take 1 tablet by mouth Daily., Disp: 28 tablet, Rfl: 0  •  tamsulosin (FLOMAX) 0.4 MG capsule 24 hr capsule, Take 1 capsule by mouth., Disp: , Rfl:   •  terbinafine (LAMISIL) 250 MG tablet, Take 1 tablet by mouth daily., Disp: 30 tablet, Rfl: 2  •  WELCHOL 625 MG tablet, TAKE 2 TABLETS TWICE DAILY, Disp: 360 tablet, Rfl: 3    Objective      Blood pressure 134/55, pulse 73, temperature 98.4 °F (36.9 °C), height 71\" (180.3 cm), weight 229 lb (104 kg), SpO2 98 %.    Physical Exam    Fazal had a diabetic foot exam performed today.   During the foot exam he had a monofilament test performed.    Neurological Sensory Findings - Altered hot/cold right ankle/foot discrimination.Unaltered hot/cold left ankle/foot discrimination. Unaltered sharp/dull right ankle/foot discrimination and unaltered sharp/dull left " ankle/foot discrimination. Right ankle/foot altered proprioception and left ankle/foot altered proprioception.    Vascular Status -  His exam exhibits right foot vasculature normal. His exam exhibits no right foot edema. His exam exhibits left foot vasculature normal. His exam exhibits no left foot edema.   Skin Integrity  -  His right foot skin is intact.  He has right foot onychomycosis.  He hasno right foot ulcer and non-callous right foot.    Fazal 's left foot skin is intact. He has left foot onychomycosis. He has no left foot ulcer and non-callous left foot..       General Appearance:    Alert, cooperative, no distress, appears stated age   Head:    Normocephalic, without obvious abnormality, atraumatic   Eyes:    PERRL, conjunctiva/corneas clear, EOM's intact   Ears:    Normal TM's and external ear canals, both ears   Nose:   Nares normal, septum midline, mucosa normal, no drainage   or sinus tenderness   Throat:   Lips, mucosa, and tongue normal; teeth and gums normal   Neck:   Supple, symmetrical, trachea midline, no adenopathy;        thyroid:  No enlargement/tenderness/nodules; no carotid    bruit or JVD   Back:     Symmetric, no curvature, ROM normal, no CVA tenderness   Lungs:     Clear to auscultation bilaterally, respirations unlabored   Chest wall:    No tenderness or deformity   Heart:    Regular rate and rhythm, S1 and S2 normal, no murmur,        rub or gallop   Abdomen:     Soft, non-tender, bowel sounds active all four quadrants,     no masses, no organomegaly   Extremities:   Extremities normal, atraumatic, no cyanosis or edema   Pulses:   2+ and symmetric all extremities   Skin:   Skin color, texture, turgor normal, no rashes or lesions   Lymph nodes:   Cervical, supraclavicular, and axillary nodes normal   Neurologic:   CNII-XII intact. Normal strength, sensation and reflexes       throughout      Results for orders placed or performed in visit on 07/07/17   POC Urinalysis Dipstick, Automated    Result Value Ref Range    Color Yellow Yellow, Straw, Dark Yellow, Mary    Clarity, UA Clear Clear    Glucose, UA Negative Negative, 1000 mg/dL (3+) mg/dL    Bilirubin Negative Negative    Ketones, UA Negative Negative    Specific Gravity  1.030 1.005 - 1.030    Blood, UA Negative Negative    pH, Urine 6.0 5.0 - 8.0    Protein, POC Negative Negative mg/dL    Urobilinogen, UA Normal Normal    Leukocytes Negative Negative    Nitrite, UA Negative Negative         Assessment/Plan   Pt diet not going well Ha1c getting worsening.       Fazal was seen today for diabetes.    Diagnoses and all orders for this visit:    Essential hypertension  -     Lipid Panel  -     Hemoglobin A1c  -     MicroAlbumin, Urine, Random - Urine, Clean Catch  -     TSH  -     T4, Free  -     Vitamin B12  -     Comprehensive Metabolic Panel  -     CBC & Differential  -     Footwear Diabetic    Type 2 diabetes, controlled, with peripheral neuropathy  -     SITagliptin (JANUVIA) 100 MG tablet; Take 1 tablet by mouth Daily.  -     metFORMIN (GLUCOPHAGE) 500 MG tablet; Take 2 tablets by mouth 2 (Two) Times a Day With Meals.  -     Lipid Panel  -     Hemoglobin A1c  -     MicroAlbumin, Urine, Random - Urine, Clean Catch  -     TSH  -     T4, Free  -     Vitamin B12  -     Comprehensive Metabolic Panel  -     CBC & Differential  -     Footwear Diabetic    Hyperlipidemia, unspecified hyperlipidemia type  -     Lipid Panel  -     Hemoglobin A1c  -     MicroAlbumin, Urine, Random - Urine, Clean Catch  -     TSH  -     T4, Free  -     Vitamin B12  -     Comprehensive Metabolic Panel  -     CBC & Differential  -     Footwear Diabetic    Cobalamin deficiency  -     Lipid Panel  -     Hemoglobin A1c  -     MicroAlbumin, Urine, Random - Urine, Clean Catch  -     TSH  -     T4, Free  -     Vitamin B12  -     Comprehensive Metabolic Panel  -     CBC & Differential  -     Footwear Diabetic      Return in about 4 months (around 3/9/2018).          There are no  Patient Instructions on file for this visit.     Matthew Carr MD    Assessment/Plan     Pt meet minimal requirement for diabetic shoes per his hx. I will sign off on paper if he present with them.        Fazal was seen today for diabetes.    Diagnoses and all orders for this visit:    Essential hypertension  -     Lipid Panel  -     Hemoglobin A1c  -     MicroAlbumin, Urine, Random - Urine, Clean Catch  -     TSH  -     T4, Free  -     Vitamin B12  -     Comprehensive Metabolic Panel  -     CBC & Differential  -     Footwear Diabetic    Type 2 diabetes, controlled, with peripheral neuropathy  -     SITagliptin (JANUVIA) 100 MG tablet; Take 1 tablet by mouth Daily.  -     metFORMIN (GLUCOPHAGE) 500 MG tablet; Take 2 tablets by mouth 2 (Two) Times a Day With Meals.  -     Lipid Panel  -     Hemoglobin A1c  -     MicroAlbumin, Urine, Random - Urine, Clean Catch  -     TSH  -     T4, Free  -     Vitamin B12  -     Comprehensive Metabolic Panel  -     CBC & Differential  -     Footwear Diabetic    Hyperlipidemia, unspecified hyperlipidemia type  -     Lipid Panel  -     Hemoglobin A1c  -     MicroAlbumin, Urine, Random - Urine, Clean Catch  -     TSH  -     T4, Free  -     Vitamin B12  -     Comprehensive Metabolic Panel  -     CBC & Differential  -     Footwear Diabetic    Cobalamin deficiency  -     Lipid Panel  -     Hemoglobin A1c  -     MicroAlbumin, Urine, Random - Urine, Clean Catch  -     TSH  -     T4, Free  -     Vitamin B12  -     Comprehensive Metabolic Panel  -     CBC & Differential  -     Footwear Diabetic      Return in about 4 months (around 3/9/2018).                   There are no Patient Instructions on file for this visit.

## 2017-11-30 ENCOUNTER — OFFICE VISIT (OUTPATIENT)
Dept: FAMILY MEDICINE CLINIC | Facility: CLINIC | Age: 72
End: 2017-11-30

## 2017-11-30 VITALS
DIASTOLIC BLOOD PRESSURE: 64 MMHG | WEIGHT: 228 LBS | HEART RATE: 71 BPM | OXYGEN SATURATION: 99 % | TEMPERATURE: 98.3 F | BODY MASS INDEX: 31.92 KG/M2 | HEIGHT: 71 IN | SYSTOLIC BLOOD PRESSURE: 153 MMHG

## 2017-11-30 DIAGNOSIS — E78.5 HYPERLIPIDEMIA, UNSPECIFIED HYPERLIPIDEMIA TYPE: Primary | ICD-10-CM

## 2017-11-30 DIAGNOSIS — E55.9 VITAMIN D DEFICIENCY: ICD-10-CM

## 2017-11-30 DIAGNOSIS — E11.42 TYPE 2 DIABETES, CONTROLLED, WITH PERIPHERAL NEUROPATHY (HCC): ICD-10-CM

## 2017-11-30 DIAGNOSIS — I10 ESSENTIAL HYPERTENSION: ICD-10-CM

## 2017-11-30 PROCEDURE — 99214 OFFICE O/P EST MOD 30 MIN: CPT | Performed by: INTERNAL MEDICINE

## 2017-11-30 NOTE — PROGRESS NOTES
Subjective     Patient ID: Fazal Berg is a 72 y.o. male. Patient is here for management of multiple medical problems.     Chief Complaint   Patient presents with   • Numbness     BLE numbness left side worse than right   • Dizziness     patient stated that when he lays down he gets light headed     Dizziness   This is a new problem. The current episode started 1 to 4 weeks ago. The problem occurs constantly. The problem has been unchanged. Associated symptoms include numbness. Pertinent negatives include no chest pain or fatigue.   Diabetes   He presents for his follow-up diabetic visit. He has type 2 diabetes mellitus. His disease course has been fluctuating. Hypoglycemia symptoms include dizziness. Pertinent negatives for diabetes include no blurred vision, no chest pain, no fatigue and no foot paresthesias. Pertinent negatives for hypoglycemia complications include no blackouts and no hospitalization. Risk factors for coronary artery disease include diabetes mellitus, obesity and dyslipidemia. His weight is stable. His breakfast blood glucose range is generally 110-130 mg/dl. His lunch blood glucose range is generally 110-130 mg/dl. His dinner blood glucose is taken between 6-7 pm. His dinner blood glucose range is generally 110-130 mg/dl. His highest blood glucose is 110-130 mg/dl.      Pt was increased on metformin and had light headed. Pt decreased metformin.       Light headed with positional change not with getting up.    Numbness anterior leg lower leg to scrotum and getting worse.  occ back pain with over activity.    Hx of back surgery. occ urine loss not offten.              The following portions of the patient's history were reviewed and updated as appropriate: allergies, current medications, past family history, past medical history, past social history, past surgical history and problem list.    Review of Systems   Constitutional: Negative for fatigue.   Eyes: Negative for blurred vision.    Cardiovascular: Negative for chest pain.   Musculoskeletal: Positive for back pain and gait problem.   Neurological: Positive for dizziness and numbness.   All other systems reviewed and are negative.      Current Outpatient Prescriptions:   •  ACCU-CHEK SOFTCLIX LANCETS lancets, 1 each by Other route 3 (three) times a day. Use as instructed, Disp: 300 each, Rfl: 3  •  albuterol (VENTOLIN HFA) 108 (90 BASE) MCG/ACT inhaler, Ventolin  (90 Base) MCG/ACT Inhalation Aerosol Solution; Patient Sig: Ventolin  (90 Base) MCG/ACT Inhalation Aerosol Solution INHALE 1 TO 2 PUFFS EVERY 4 TO 6 HOURS AS NEEDED.; 1; 11; 18-Aug-2015; Active, Disp: , Rfl:   •  Alcohol Swabs (B-D SINGLE USE SWABS REGULAR) pads, 1 swab 3 (three) times a day., Disp: 90 each, Rfl: 3  •  alfuzosin (UROXATRAL) 10 MG 24 hr tablet, Take  by mouth., Disp: , Rfl:   •  aspirin 81 MG EC tablet, Take  by mouth daily., Disp: , Rfl:   •  Blood Gluc Meter Disp-Strips device, 1 strip 3 (three) times a day. Patient needs Accu-chek meter and test strips., Disp: 1 each, Rfl: 0  •  Blood Glucose Calibration (SURECHEK CONTROL SOLUTION) NORMAL liquid, 1 bottle by In Vitro route every 30 (thirty) days., Disp: 3 each, Rfl: 3  •  carvedilol (COREG) 25 MG tablet, Take 1 tablet by mouth 2 (Two) Times a Day With Meals., Disp: 180 tablet, Rfl: 3  •  Cholecalciferol (VITAMIN D) 2000 UNITS tablet, Take 1 capsule by mouth 3 (Three) Times a Week., Disp: , Rfl:   •  Chromium-Cinnamon (CINNAMON PLUS CHROMIUM) 100-500 MCG-MG capsule, Take 2 capsules by mouth 2 (two) times a day., Disp: , Rfl:   •  Cyanocobalamin 2500 MCG sublingual tablet, Place  under the tongue 3 (Three) Times a Week., Disp: , Rfl:   •  finasteride (PROSCAR) 5 MG tablet, Take 1 tablet by mouth Daily., Disp: 90 tablet, Rfl: 2  •  glimepiride (AMARYL) 4 MG tablet, TAKE 1 TABLET EVERY DAY, Disp: 90 tablet, Rfl: 3  •  Glucosamine-Chondroit-Vit C-Mn (GLUCOSAMINE 1500 COMPLEX PO), Take  by mouth daily.,  "Disp: , Rfl:   •  glucosamine-chondroitin 500-400 MG capsule capsule, Take  by mouth 3 (three) times a day with meals., Disp: , Rfl:   •  glucose blood (ACCU-CHEK IDANIA) test strip, 1 each by Other route 3 (three) times a day. Use as instructed, Disp: 90 each, Rfl: 3  •  glucose blood test strip, 1 each by Other route 3 (three) times a day. Use as instructed, Disp: 300 each, Rfl: 3  •  lisinopril-hydrochlorothiazide (PRINZIDE,ZESTORETIC) 10-12.5 MG per tablet, TAKE 1 TABLET EVERY DAY, Disp: 90 tablet, Rfl: 3  •  metFORMIN (GLUCOPHAGE) 500 MG tablet, Take 2 tablets by mouth 2 (Two) Times a Day With Meals., Disp: 180 tablet, Rfl: 11  •  Mirabegron ER (MYRBETRIQ) 50 MG tablet sustained-release 24 hour, Take  by mouth., Disp: , Rfl:   •  Multiple Vitamins-Minerals (MULTIVITAMIN GUMMIES ADULTS) chewable tablet, Chew daily., Disp: , Rfl:   •  Omeprazole 20 MG tablet delayed-release, Take  by mouth., Disp: , Rfl:   •  SITagliptin (JANUVIA) 100 MG tablet, Take 1 tablet by mouth Daily., Disp: 28 tablet, Rfl: 0  •  tamsulosin (FLOMAX) 0.4 MG capsule 24 hr capsule, Take 1 capsule by mouth., Disp: , Rfl:   •  terbinafine (LAMISIL) 250 MG tablet, Take 1 tablet by mouth daily., Disp: 30 tablet, Rfl: 2  •  WELCHOL 625 MG tablet, TAKE 2 TABLETS TWICE DAILY, Disp: 360 tablet, Rfl: 3    Objective      Blood pressure 153/64, pulse 71, temperature 98.3 °F (36.8 °C), height 71\" (180.3 cm), weight 228 lb (103 kg), SpO2 99 %.    Physical Exam     General Appearance:    Alert, cooperative, no distress, appears stated age   Head:    Normocephalic, without obvious abnormality, atraumatic   Eyes:    PERRL, conjunctiva/corneas clear, EOM's intact   Ears:    Normal TM's and external ear canals, both ears   Nose:   Nares normal, septum midline, mucosa normal, no drainage   or sinus tenderness   Throat:   Lips, mucosa, and tongue normal; teeth and gums normal   Neck:   Supple, symmetrical, trachea midline, no adenopathy;        thyroid:  No " enlargement/tenderness/nodules; no carotid    bruit or JVD   Back:     Symmetric, no curvature, ROM normal, no CVA tenderness   Lungs:     Clear to auscultation bilaterally, respirations unlabored   Chest wall:    No tenderness or deformity   Heart:    Regular rate and rhythm, S1 and S2 normal, no murmur,        rub or gallop   Abdomen:     Soft, non-tender, bowel sounds active all four quadrants,     no masses, no organomegaly   Extremities:   Extremities normal, atraumatic, no cyanosis or edema   Pulses:   2+ and symmetric all extremities   Skin:   Skin color, texture, turgor normal, no rashes or lesions   Lymph nodes:   Cervical, supraclavicular, and axillary nodes normal   Neurologic:   CNII-XII intact. Normal strength, sensation and reflexes       throughout      Results for orders placed or performed in visit on 07/07/17   POC Urinalysis Dipstick, Automated   Result Value Ref Range    Color Yellow Yellow, Straw, Dark Yellow, Mary    Clarity, UA Clear Clear    Glucose, UA Negative Negative, 1000 mg/dL (3+) mg/dL    Bilirubin Negative Negative    Ketones, UA Negative Negative    Specific Gravity  1.030 1.005 - 1.030    Blood, UA Negative Negative    pH, Urine 6.0 5.0 - 8.0    Protein, POC Negative Negative mg/dL    Urobilinogen, UA Normal Normal    Leukocytes Negative Negative    Nitrite, UA Negative Negative         Assessment/Plan   Pt will go back to [facility]. Pt request to go back [place] prior to going back to Dr [place].        Fazal was seen today for numbness and dizziness.    Diagnoses and all orders for this visit:    Hyperlipidemia, unspecified hyperlipidemia type  Get labs  Essential hypertension  Get labs  Vitamin D deficiency  Get labs    Type 2 diabetes, controlled, with peripheral neuropathy  Get labs.      No Follow-up on file.          There are no Patient Instructions on file for this visit.     Matthew Carr MD    Assessment/Plan

## 2018-01-08 ENCOUNTER — TELEPHONE (OUTPATIENT)
Dept: INTERNAL MEDICINE | Facility: CLINIC | Age: 73
End: 2018-01-08

## 2018-01-08 RX ORDER — CARVEDILOL 25 MG/1
25 TABLET ORAL 2 TIMES DAILY WITH MEALS
Qty: 180 TABLET | Refills: 1 | Status: SHIPPED | OUTPATIENT
Start: 2018-01-08 | End: 2018-07-06 | Stop reason: SDUPTHER

## 2018-01-08 NOTE — TELEPHONE ENCOUNTER
Pt called back to check on the status of his refill.  Said he is out of medication and has not taken his dosage for today.

## 2018-02-05 ENCOUNTER — TELEPHONE (OUTPATIENT)
Dept: INTERNAL MEDICINE | Facility: CLINIC | Age: 73
End: 2018-02-05

## 2018-02-05 RX ORDER — LISINOPRIL AND HYDROCHLOROTHIAZIDE 12.5; 1 MG/1; MG/1
TABLET ORAL
Qty: 90 TABLET | Refills: 3 | Status: CANCELLED | OUTPATIENT
Start: 2018-02-05

## 2018-02-07 ENCOUNTER — OFFICE VISIT (OUTPATIENT)
Dept: UROLOGY | Facility: CLINIC | Age: 73
End: 2018-02-07

## 2018-02-07 VITALS
SYSTOLIC BLOOD PRESSURE: 127 MMHG | TEMPERATURE: 98.2 F | DIASTOLIC BLOOD PRESSURE: 62 MMHG | HEIGHT: 71 IN | WEIGHT: 228 LBS | HEART RATE: 68 BPM | BODY MASS INDEX: 31.92 KG/M2 | OXYGEN SATURATION: 97 %

## 2018-02-07 DIAGNOSIS — C61 PROSTATE CANCER (HCC): Primary | ICD-10-CM

## 2018-02-07 LAB
BILIRUB BLD-MCNC: NEGATIVE MG/DL
CLARITY, POC: CLEAR
COLOR UR: YELLOW
GLUCOSE UR STRIP-MCNC: NEGATIVE MG/DL
KETONES UR QL: NEGATIVE
LEUKOCYTE EST, POC: NEGATIVE
NITRITE UR-MCNC: NEGATIVE MG/ML
PH UR: 5.5 [PH] (ref 5–8)
PROT UR STRIP-MCNC: NEGATIVE MG/DL
RBC # UR STRIP: NEGATIVE /UL
SP GR UR: 1.02 (ref 1–1.03)
UROBILINOGEN UR QL: NORMAL

## 2018-02-07 PROCEDURE — 99213 OFFICE O/P EST LOW 20 MIN: CPT | Performed by: UROLOGY

## 2018-02-07 PROCEDURE — 81003 URINALYSIS AUTO W/O SCOPE: CPT | Performed by: UROLOGY

## 2018-02-07 RX ORDER — LISINOPRIL AND HYDROCHLOROTHIAZIDE 12.5; 1 MG/1; MG/1
1 TABLET ORAL DAILY
Qty: 90 TABLET | Refills: 3 | Status: SHIPPED | OUTPATIENT
Start: 2018-02-07 | End: 2019-01-18

## 2018-02-07 NOTE — PROGRESS NOTES
Chief Complaint  Benign Prostatic Hypertrophy (6 month fup.) and Prostate Cancer        HPI  Morena is a 72 y.o. male who has early stage low-grade prostate cancer that is being managed on a program of active surveillance.  He was placed on Proscar to shrink his enlarged gland and his PSA has dropped from 10-1.  He continues to minimize any difficulty voiding or any other symptoms related to his prostate.  His PSA was drawn in July and again in November with an actual ongoing decline.    Vitals:    02/07/18 1603   BP: 127/62   Pulse: 68   Temp: 98.2 °F (36.8 °C)   SpO2: 97%       Past Medical History  Past Medical History:   Diagnosis Date   • Abnormal menses    • Acute asthma exacerbation    • Acute sinusitis    • Acute URI    • Allergic rhinitis    • Anemia    • Asthma    • Benign prostatic hypertrophy    • Deafness    • ED (erectile dysfunction)    • Family history of deafness and hearing loss    • GERD (gastroesophageal reflux disease)    • Hyperlipidemia    • Hypertension    • Obstructive sleep apnea    • Peripheral neuropathy    • Short of breath on exertion    • Skin cancer    • Type 2 diabetes mellitus    • Urinary frequency    • Vitamin B 12 deficiency    • Vitamin D deficiency        Past Surgical History  Past Surgical History:   Procedure Laterality Date   • APPENDECTOMY     • HAND SURGERY     • KNEE ARTHROSCOPY Left        Medications  has a current medication list which includes the following prescription(s): accu-chek softclix lancets, albuterol, b-d single use swabs regular, alfuzosin, aspirin, blood gluc meter disp-strips, surechek control solution, carvedilol, vitamin d, chromium-cinnamon, cyanocobalamin, finasteride, glimepiride, glucosamine-chondroit-vit c-mn, glucosamine-chondroitin, glucose blood, glucose blood, lisinopril-hydrochlorothiazide, metformin, mirabegron er, multivitamin gummies adults, omeprazole, sitagliptin, tamsulosin, terbinafine, and welchol.      Allergies  Allergies    Allergen Reactions   • Statins Myalgia       Social History  Social History     Social History Narrative       Family History  He has no family history of bladder or kidney cancer  He has no family history of kidney stones      AUA Symptom Score:      Review of Systems  Review of Systems    Physical Exam  Physical Exam   Constitutional: He is oriented to person, place, and time. He appears well-developed and well-nourished.   HENT:   Head: Normocephalic and atraumatic.   Neck: Normal range of motion.   Pulmonary/Chest: Effort normal. No respiratory distress.   Abdominal: Soft. He exhibits no distension and no mass. There is no tenderness. No hernia.   Genitourinary: Rectum normal and prostate normal.   Musculoskeletal: Normal range of motion.   Lymphadenopathy:     He has no cervical adenopathy.   Neurological: He is alert and oriented to person, place, and time.   Skin: Skin is warm and dry.   Psychiatric: He has a normal mood and affect. His behavior is normal.   Vitals reviewed.      Labs Recent and today in the office:  Results for orders placed or performed in visit on 02/07/18   POC Urinalysis Dipstick, Automated   Result Value Ref Range    Color Yellow Yellow, Straw, Dark Yellow, Mary    Clarity, UA Clear Clear    Glucose, UA Negative Negative, 1000 mg/dL (3+) mg/dL    Bilirubin Negative Negative    Ketones, UA Negative Negative    Specific Gravity  1.025 1.005 - 1.030    Blood, UA Negative Negative    pH, Urine 5.5 5.0 - 8.0    Protein, POC Negative Negative mg/dL    Urobilinogen, UA Normal Normal    Leukocytes Negative Negative    Nitrite, UA Negative Negative         Assessment & Plan  #1 prostate cancer: Digital rectal exam is benign and PSA looks good.  He is encouraged to eat a heart healthy diet and quit eating some much red meat that he loves.  He needs close surveillance and will return in 6 months with a repeat PSA for RIAN.

## 2018-03-15 LAB
ALBUMIN SERPL-MCNC: 4.3 G/DL (ref 3.5–5)
ALBUMIN/GLOB SERPL: 1.7 G/DL (ref 1–2)
ALP SERPL-CCNC: 51 U/L (ref 38–126)
ALT SERPL-CCNC: 37 U/L (ref 13–69)
AST SERPL-CCNC: 23 U/L (ref 15–46)
BASOPHILS # BLD AUTO: 0.07 10*3/MM3 (ref 0–0.2)
BASOPHILS NFR BLD AUTO: 1.1 % (ref 0–2.5)
BILIRUB SERPL-MCNC: 0.9 MG/DL (ref 0.2–1.3)
BUN SERPL-MCNC: 15 MG/DL (ref 7–20)
BUN/CREAT SERPL: 15 (ref 6.3–21.9)
CALCIUM SERPL-MCNC: 10.2 MG/DL (ref 8.4–10.2)
CHLORIDE SERPL-SCNC: 102 MMOL/L (ref 98–107)
CHOLEST SERPL-MCNC: 191 MG/DL (ref 0–199)
CO2 SERPL-SCNC: 28 MMOL/L (ref 26–30)
CREAT SERPL-MCNC: 1 MG/DL (ref 0.6–1.3)
EOSINOPHIL # BLD AUTO: 0.25 10*3/MM3 (ref 0–0.7)
EOSINOPHIL NFR BLD AUTO: 3.8 % (ref 0–7)
ERYTHROCYTE [DISTWIDTH] IN BLOOD BY AUTOMATED COUNT: 12.1 % (ref 11.5–14.5)
GFR SERPLBLD CREATININE-BSD FMLA CKD-EPI: 74 ML/MIN/1.73
GFR SERPLBLD CREATININE-BSD FMLA CKD-EPI: 90 ML/MIN/1.73
GLOBULIN SER CALC-MCNC: 2.6 GM/DL
GLUCOSE SERPL-MCNC: 157 MG/DL (ref 74–98)
HBA1C MFR BLD: 7 %
HCT VFR BLD AUTO: 39.5 % (ref 42–52)
HDLC SERPL-MCNC: 47 MG/DL (ref 40–60)
HGB BLD-MCNC: 13.5 G/DL (ref 14–18)
IMM GRANULOCYTES # BLD: 0.03 10*3/MM3 (ref 0–0.06)
IMM GRANULOCYTES NFR BLD: 0.5 % (ref 0–0.6)
LDLC SERPL CALC-MCNC: 90 MG/DL (ref 0–99)
LYMPHOCYTES # BLD AUTO: 1.69 10*3/MM3 (ref 0.6–3.4)
LYMPHOCYTES NFR BLD AUTO: 25.9 % (ref 10–50)
MCH RBC QN AUTO: 31.8 PG (ref 27–31)
MCHC RBC AUTO-ENTMCNC: 34.2 G/DL (ref 30–37)
MCV RBC AUTO: 92.9 FL (ref 80–94)
MICROALBUMIN UR-MCNC: 40.1 UG/ML
MONOCYTES # BLD AUTO: 0.71 10*3/MM3 (ref 0–0.9)
MONOCYTES NFR BLD AUTO: 10.9 % (ref 0–12)
NEUTROPHILS # BLD AUTO: 3.77 10*3/MM3 (ref 2–6.9)
NEUTROPHILS NFR BLD AUTO: 57.8 % (ref 37–80)
NRBC BLD AUTO-RTO: 0 /100 WBC (ref 0–0)
PLATELET # BLD AUTO: 193 10*3/MM3 (ref 130–400)
POTASSIUM SERPL-SCNC: 4.8 MMOL/L (ref 3.5–5.1)
PROT SERPL-MCNC: 6.9 G/DL (ref 6.3–8.2)
RBC # BLD AUTO: 4.25 10*6/MM3 (ref 4.7–6.1)
SODIUM SERPL-SCNC: 143 MMOL/L (ref 137–145)
T4 FREE SERPL-MCNC: 1.05 NG/DL (ref 0.78–2.19)
TRIGL SERPL-MCNC: 270 MG/DL
TSH SERPL DL<=0.005 MIU/L-ACNC: 1.4 MIU/ML (ref 0.47–4.68)
VIT B12 SERPL-MCNC: 360 PG/ML (ref 239–931)
VLDLC SERPL CALC-MCNC: 54 MG/DL
WBC # BLD AUTO: 6.52 10*3/MM3 (ref 4.8–10.8)

## 2018-03-19 ENCOUNTER — OFFICE VISIT (OUTPATIENT)
Dept: INTERNAL MEDICINE | Facility: CLINIC | Age: 73
End: 2018-03-19

## 2018-03-19 VITALS
HEIGHT: 71 IN | OXYGEN SATURATION: 99 % | WEIGHT: 226 LBS | HEART RATE: 68 BPM | RESPIRATION RATE: 16 BRPM | BODY MASS INDEX: 31.64 KG/M2 | TEMPERATURE: 98.1 F | DIASTOLIC BLOOD PRESSURE: 68 MMHG | SYSTOLIC BLOOD PRESSURE: 134 MMHG

## 2018-03-19 DIAGNOSIS — E78.5 HYPERLIPIDEMIA, UNSPECIFIED HYPERLIPIDEMIA TYPE: ICD-10-CM

## 2018-03-19 DIAGNOSIS — E11.42 TYPE 2 DIABETES, CONTROLLED, WITH PERIPHERAL NEUROPATHY (HCC): Primary | ICD-10-CM

## 2018-03-19 DIAGNOSIS — E53.8 COBALAMIN DEFICIENCY: ICD-10-CM

## 2018-03-19 DIAGNOSIS — J45.40 ASTHMA, ALLERGIC, MODERATE PERSISTENT, UNCOMPLICATED: ICD-10-CM

## 2018-03-19 DIAGNOSIS — I10 ESSENTIAL HYPERTENSION: ICD-10-CM

## 2018-03-19 DIAGNOSIS — R79.89 LOW VITAMIN D LEVEL: ICD-10-CM

## 2018-03-19 PROCEDURE — 99214 OFFICE O/P EST MOD 30 MIN: CPT | Performed by: INTERNAL MEDICINE

## 2018-03-19 NOTE — PROGRESS NOTES
Subjective     Patient ID: Fazal Berg is a 72 y.o. male. Patient is here for management of multiple medical problems.     Chief Complaint   Patient presents with   • Hyperlipidemia     4 month follow-up     Hyperlipidemia   This is a chronic problem. Recent lipid tests were reviewed and are normal. Pertinent negatives include no chest pain. The current treatment provides moderate improvement of lipids. Risk factors for coronary artery disease include diabetes mellitus, dyslipidemia, hypertension and obesity.   Hypertension   This is a chronic problem. The current episode started more than 1 year ago. The problem is controlled. Pertinent negatives include no blurred vision, chest pain or headaches. Risk factors for coronary artery disease include male gender, diabetes mellitus and dyslipidemia. Past treatments include ACE inhibitors and diuretics. There are no compliance problems.  There is no history of angina, kidney disease or CAD/MI. Current antihypertension treatment includes ACE inhibitors and diuretics.   Diabetes   He presents for his follow-up diabetic visit. He has type 2 diabetes mellitus. His disease course has been stable. Pertinent negatives for hypoglycemia include no confusion, dizziness or headaches. Pertinent negatives for diabetes include no blurred vision, no chest pain, no fatigue and no foot paresthesias. There are no hypoglycemic complications. Symptoms are improving. Risk factors for coronary artery disease include hypertension, male sex, dyslipidemia and diabetes mellitus. He is following a diabetic diet. An ACE inhibitor/angiotensin II receptor blocker is being taken.        Pt stopped wellchole due to myalgia.            The following portions of the patient's history were reviewed and updated as appropriate: allergies, current medications, past family history, past medical history, past social history, past surgical history and problem list.    Review of Systems   Constitutional:  Negative for fatigue.   Eyes: Negative for blurred vision.   Cardiovascular: Negative for chest pain.   Skin: Negative for wound.   Neurological: Negative for dizziness and headaches.   Psychiatric/Behavioral: Negative for confusion.   All other systems reviewed and are negative.      Current Outpatient Prescriptions:   •  ACCU-CHEK SOFTCLIX LANCETS lancets, 1 each by Other route 3 (three) times a day. Use as instructed, Disp: 300 each, Rfl: 3  •  albuterol (VENTOLIN HFA) 108 (90 BASE) MCG/ACT inhaler, Ventolin  (90 Base) MCG/ACT Inhalation Aerosol Solution; Patient Sig: Ventolin  (90 Base) MCG/ACT Inhalation Aerosol Solution INHALE 1 TO 2 PUFFS EVERY 4 TO 6 HOURS AS NEEDED.; 1; 11; 18-Aug-2015; Active, Disp: , Rfl:   •  Alcohol Swabs (B-D SINGLE USE SWABS REGULAR) pads, 1 swab 3 (three) times a day., Disp: 90 each, Rfl: 3  •  alfuzosin (UROXATRAL) 10 MG 24 hr tablet, Take  by mouth., Disp: , Rfl:   •  aspirin 81 MG EC tablet, Take  by mouth daily., Disp: , Rfl:   •  Blood Gluc Meter Disp-Strips device, 1 strip 3 (three) times a day. Patient needs Accu-chek meter and test strips., Disp: 1 each, Rfl: 0  •  Blood Glucose Calibration (SURECHEK CONTROL SOLUTION) NORMAL liquid, 1 bottle by In Vitro route every 30 (thirty) days., Disp: 3 each, Rfl: 3  •  carvedilol (COREG) 25 MG tablet, Take 1 tablet by mouth 2 (Two) Times a Day With Meals., Disp: 180 tablet, Rfl: 1  •  Cholecalciferol (VITAMIN D) 2000 UNITS tablet, Take 1 capsule by mouth 3 (Three) Times a Week., Disp: , Rfl:   •  Chromium-Cinnamon (CINNAMON PLUS CHROMIUM) 100-500 MCG-MG capsule, Take 2 capsules by mouth 2 (two) times a day., Disp: , Rfl:   •  Cyanocobalamin 2500 MCG sublingual tablet, Place  under the tongue 3 (Three) Times a Week., Disp: , Rfl:   •  finasteride (PROSCAR) 5 MG tablet, Take 1 tablet by mouth Daily., Disp: 90 tablet, Rfl: 2  •  glimepiride (AMARYL) 4 MG tablet, TAKE 1 TABLET EVERY DAY, Disp: 90 tablet, Rfl: 3  •   "Glucosamine-Chondroit-Vit C-Mn (GLUCOSAMINE 1500 COMPLEX PO), Take  by mouth 2 (Two) Times a Day., Disp: , Rfl:   •  glucose blood (ACCU-CHEK IDANIA) test strip, 1 each by Other route 3 (three) times a day. Use as instructed, Disp: 90 each, Rfl: 3  •  lisinopril-hydrochlorothiazide (PRINZIDE,ZESTORETIC) 10-12.5 MG per tablet, Take 1 tablet by mouth Daily., Disp: 90 tablet, Rfl: 3  •  metFORMIN (GLUCOPHAGE) 500 MG tablet, Take 2 tablets by mouth 2 (Two) Times a Day With Meals., Disp: 180 tablet, Rfl: 11  •  Mirabegron ER (MYRBETRIQ) 50 MG tablet sustained-release 24 hour, Take  by mouth., Disp: , Rfl:   •  Omeprazole 20 MG tablet delayed-release, Take  by mouth., Disp: , Rfl:   •  SITagliptin (JANUVIA) 100 MG tablet, Take 1 tablet by mouth Daily., Disp: 28 tablet, Rfl: 0  •  tamsulosin (FLOMAX) 0.4 MG capsule 24 hr capsule, Take 1 capsule by mouth., Disp: , Rfl:   •  Fluticasone Furoate-Vilanterol (BREO ELLIPTA) 200-25 MCG/INH aerosol powder , Inhale 1 puff Daily., Disp: 90 each, Rfl: 11  •  pneumococcal conj. 13-valent (PREVNAR-13) vaccine, Inject 0.5 mL into the shoulder, thigh, or buttocks 1 (One) Time for 1 dose., Disp: 0.5 mL, Rfl: 0    Objective      Blood pressure 134/68, pulse 68, temperature 98.1 °F (36.7 °C), temperature source Oral, resp. rate 16, height 180.3 cm (71\"), weight 103 kg (226 lb), SpO2 99 %.    Physical Exam     General Appearance:    Alert, cooperative, no distress, appears stated age   Head:    Normocephalic, without obvious abnormality, atraumatic   Eyes:    PERRL, conjunctiva/corneas clear, EOM's intact   Ears:    Normal TM's and external ear canals, both ears   Nose:   Nares normal, septum midline, mucosa normal, no drainage   or sinus tenderness   Throat:   Lips, mucosa, and tongue normal; teeth and gums normal   Neck:   Supple, symmetrical, trachea midline, no adenopathy;        thyroid:  No enlargement/tenderness/nodules; no carotid    bruit or JVD   Back:     Symmetric, no curvature, " ROM normal, no CVA tenderness   Lungs:     Wheezing to auscultation bilaterally, respirations unlabored   Chest wall:    No tenderness or deformity   Heart:    Regular rate and rhythm, S1 and S2 normal, no murmur,        rub or gallop   Abdomen:     Soft, non-tender, bowel sounds active all four quadrants,     no masses, no organomegaly   Extremities:   Extremities normal, atraumatic, no cyanosis or edema   Pulses:   2+ and symmetric all extremities   Skin:   Skin color, texture, turgor normal, no rashes or lesions   Lymph nodes:   Cervical, supraclavicular, and axillary nodes normal   Neurologic:   CNII-XII intact. Normal strength, sensation and reflexes       throughout      Results for orders placed or performed in visit on 02/07/18   POC Urinalysis Dipstick, Automated   Result Value Ref Range    Color Yellow Yellow, Straw, Dark Yellow, Mary    Clarity, UA Clear Clear    Glucose, UA Negative Negative, 1000 mg/dL (3+) mg/dL    Bilirubin Negative Negative    Ketones, UA Negative Negative    Specific Gravity  1.025 1.005 - 1.030    Blood, UA Negative Negative    pH, Urine 5.5 5.0 - 8.0    Protein, POC Negative Negative mg/dL    Urobilinogen, UA Normal Normal    Leukocytes Negative Negative    Nitrite, UA Negative Negative         Assessment/Plan      Pt labs doing better.    Pt will start b12 one a week.            Fazal was seen today for hyperlipidemia.    Diagnoses and all orders for this visit:    Type 2 diabetes, controlled, with peripheral neuropathy  -     Hemoglobin A1c    Hyperlipidemia, unspecified hyperlipidemia type  -     TSH  -     T4, Free    Essential hypertension  -     TSH  -     T4, Free  -     Comprehensive Metabolic Panel    Cobalamin deficiency  -     Vitamin B12    Low vitamin D level  -     Vitamin D 25 Hydroxy    Asthma, allergic, moderate persistent, uncomplicated  -     Fluticasone Furoate-Vilanterol (BREO ELLIPTA) 200-25 MCG/INH aerosol powder ; Inhale 1 puff Daily.    Other orders  -      pneumococcal conj. 13-valent (PREVNAR-13) vaccine; Inject 0.5 mL into the shoulder, thigh, or buttocks 1 (One) Time for 1 dose.      Return in about 6 months (around 9/19/2018).          There are no Patient Instructions on file for this visit.     Matthew Carr MD    Assessment/Plan

## 2018-04-23 ENCOUNTER — OFFICE VISIT (OUTPATIENT)
Dept: INTERNAL MEDICINE | Facility: CLINIC | Age: 73
End: 2018-04-23

## 2018-04-23 VITALS
WEIGHT: 225 LBS | HEIGHT: 71 IN | OXYGEN SATURATION: 98 % | TEMPERATURE: 98.1 F | SYSTOLIC BLOOD PRESSURE: 157 MMHG | HEART RATE: 85 BPM | BODY MASS INDEX: 31.5 KG/M2 | RESPIRATION RATE: 16 BRPM | DIASTOLIC BLOOD PRESSURE: 72 MMHG

## 2018-04-23 DIAGNOSIS — J40 BRONCHITIS: ICD-10-CM

## 2018-04-23 DIAGNOSIS — J01.40 ACUTE NON-RECURRENT PANSINUSITIS: Primary | ICD-10-CM

## 2018-04-23 DIAGNOSIS — D04.9 BASAL CELL CARCINOMA IN SITU OF SKIN: ICD-10-CM

## 2018-04-23 PROCEDURE — 99214 OFFICE O/P EST MOD 30 MIN: CPT | Performed by: INTERNAL MEDICINE

## 2018-04-23 PROCEDURE — 17270 DSTR MAL LES S/N/H/F/G .5 /<: CPT | Performed by: INTERNAL MEDICINE

## 2018-04-23 RX ORDER — GLIMEPIRIDE 4 MG/1
4 TABLET ORAL DAILY
Qty: 90 TABLET | Refills: 3 | Status: CANCELLED | OUTPATIENT
Start: 2018-04-23

## 2018-04-23 RX ORDER — AMOXICILLIN AND CLAVULANATE POTASSIUM 875; 125 MG/1; MG/1
1 TABLET, FILM COATED ORAL EVERY 12 HOURS SCHEDULED
Qty: 20 TABLET | Refills: 0 | Status: SHIPPED | OUTPATIENT
Start: 2018-04-23 | End: 2018-05-11

## 2018-04-23 NOTE — PROGRESS NOTES
Subjective     Patient ID: Fazal Berg is a 73 y.o. male. Patient is here for management of multiple medical problems.     Chief Complaint   Patient presents with   • Nasal Congestion     patient having sinus pressure, throat is hoarse, green sinus drainage   • URI     coughing up green phlegm      History of Present Illness       1  Week with uri. Progressed to S/t and loss of vioice. Taking otc sinius pills.  Sweats and chills.   soa.    + sinus pressure.    Ear pressure.     Non healing skin issue on head/.hx of skin ca.  Wants treatment today.    The following portions of the patient's history were reviewed and updated as appropriate: allergies, current medications, past family history, past medical history, past social history, past surgical history and problem list.    Review of Systems   Constitutional: Positive for fatigue.   HENT: Positive for congestion, facial swelling, hearing loss and postnasal drip.    Respiratory: Positive for cough and shortness of breath.    Gastrointestinal: Positive for diarrhea. Negative for abdominal distention.   Skin: Positive for rash.   All other systems reviewed and are negative.      Current Outpatient Prescriptions:   •  ACCU-CHEK SOFTCLIX LANCETS lancets, 1 each by Other route 3 (three) times a day. Use as instructed, Disp: 300 each, Rfl: 3  •  albuterol (VENTOLIN HFA) 108 (90 BASE) MCG/ACT inhaler, Ventolin  (90 Base) MCG/ACT Inhalation Aerosol Solution; Patient Sig: Ventolin  (90 Base) MCG/ACT Inhalation Aerosol Solution INHALE 1 TO 2 PUFFS EVERY 4 TO 6 HOURS AS NEEDED.; 1; 11; 18-Aug-2015; Active, Disp: , Rfl:   •  Alcohol Swabs (B-D SINGLE USE SWABS REGULAR) pads, 1 swab 3 (three) times a day., Disp: 90 each, Rfl: 3  •  alfuzosin (UROXATRAL) 10 MG 24 hr tablet, Take  by mouth., Disp: , Rfl:   •  aspirin 81 MG EC tablet, Take  by mouth daily., Disp: , Rfl:   •  Blood Gluc Meter Disp-Strips device, 1 strip 3 (three) times a day. Patient needs  Accu-chek meter and test strips., Disp: 1 each, Rfl: 0  •  Blood Glucose Calibration (SURECHEK CONTROL SOLUTION) NORMAL liquid, 1 bottle by In Vitro route every 30 (thirty) days., Disp: 3 each, Rfl: 3  •  carvedilol (COREG) 25 MG tablet, Take 1 tablet by mouth 2 (Two) Times a Day With Meals., Disp: 180 tablet, Rfl: 1  •  Cholecalciferol (VITAMIN D) 2000 UNITS tablet, Take 1 capsule by mouth 3 (Three) Times a Week., Disp: , Rfl:   •  Chromium-Cinnamon (CINNAMON PLUS CHROMIUM) 100-500 MCG-MG capsule, Take 2 capsules by mouth 2 (two) times a day., Disp: , Rfl:   •  Cyanocobalamin 2500 MCG sublingual tablet, Place  under the tongue 3 (Three) Times a Week., Disp: , Rfl:   •  finasteride (PROSCAR) 5 MG tablet, Take 1 tablet by mouth Daily., Disp: 90 tablet, Rfl: 2  •  Fluticasone Furoate-Vilanterol (BREO ELLIPTA) 200-25 MCG/INH aerosol powder , Inhale 1 puff Daily., Disp: 90 each, Rfl: 11  •  glimepiride (AMARYL) 4 MG tablet, TAKE 1 TABLET EVERY DAY, Disp: 90 tablet, Rfl: 3  •  Glucosamine-Chondroit-Vit C-Mn (GLUCOSAMINE 1500 COMPLEX PO), Take  by mouth 2 (Two) Times a Day., Disp: , Rfl:   •  glucose blood (ACCU-CHEK IDANIA) test strip, 1 each by Other route 3 (three) times a day. Use as instructed, Disp: 90 each, Rfl: 3  •  lisinopril-hydrochlorothiazide (PRINZIDE,ZESTORETIC) 10-12.5 MG per tablet, Take 1 tablet by mouth Daily., Disp: 90 tablet, Rfl: 3  •  metFORMIN (GLUCOPHAGE) 500 MG tablet, Take 2 tablets by mouth 2 (Two) Times a Day With Meals., Disp: 180 tablet, Rfl: 11  •  Mirabegron ER (MYRBETRIQ) 50 MG tablet sustained-release 24 hour, Take  by mouth., Disp: , Rfl:   •  Omeprazole 20 MG tablet delayed-release, Take  by mouth., Disp: , Rfl:   •  SITagliptin (JANUVIA) 100 MG tablet, Take 1 tablet by mouth Daily., Disp: 28 tablet, Rfl: 0  •  tamsulosin (FLOMAX) 0.4 MG capsule 24 hr capsule, Take 1 capsule by mouth., Disp: , Rfl:   •  amoxicillin-clavulanate (AUGMENTIN) 875-125 MG per tablet, Take 1 tablet by mouth  "Every 12 (Twelve) Hours., Disp: 20 tablet, Rfl: 0    Objective      Blood pressure 157/72, pulse 85, temperature 98.1 °F (36.7 °C), temperature source Oral, resp. rate 16, height 180.3 cm (71\"), weight 102 kg (225 lb), SpO2 98 %.    Physical Exam     General Appearance:    Alert, cooperative, no distress, appears stated age   Head:    Normocephalic, without obvious abnormality, atraumatic   Eyes:    PERRL, conjunctiva/corneas clear, EOM's intact   Ears:    Normal TM's and external ear canals, both ears   Nose:   Nares normal, septum midline, mucosa normal, no drainage   or sinus tenderness   Throat:   Lips, mucosa, and tongue normal; teeth and gums normal   Neck:   Supple, symmetrical, trachea midline, no adenopathy;        thyroid:  No enlargement/tenderness/nodules; no carotid    bruit or JVD   Back:     Symmetric, no curvature, ROM normal, no CVA tenderness   Lungs:     Clear to auscultation bilaterally, respirations unlabored   Chest wall:    No tenderness or deformity   Heart:    Regular rate and rhythm, S1 and S2 normal, no murmur,        rub or gallop   Abdomen:     Soft, non-tender, bowel sounds active all four quadrants,     no masses, no organomegaly   Extremities:   Extremities normal, atraumatic, no cyanosis or edema   Pulses:   2+ and symmetric all extremities   Skin:   Basal cell on head.     Lymph nodes:   Cervical, supraclavicular, and axillary nodes normal   Neurologic:   CNII-XII intact. Normal strength, sensation and reflexes       throughout      Results for orders placed or performed in visit on 02/07/18   POC Urinalysis Dipstick, Automated   Result Value Ref Range    Color Yellow Yellow, Straw, Dark Yellow, Mary    Clarity, UA Clear Clear    Glucose, UA Negative Negative, 1000 mg/dL (3+) mg/dL    Bilirubin Negative Negative    Ketones, UA Negative Negative    Specific Gravity  1.025 1.005 - 1.030    Blood, UA Negative Negative    pH, Urine 5.5 5.0 - 8.0    Protein, POC Negative Negative mg/dL "    Urobilinogen, UA Normal Normal    Leukocytes Negative Negative    Nitrite, UA Negative Negative         Assessment/Plan   Cryo to 3 to head.    Fazal was seen today for nasal congestion and uri.    Diagnoses and all orders for this visit:    Acute non-recurrent pansinusitis    Bronchitis    Basal cell carcinoma in situ of skin    Other orders  -     amoxicillin-clavulanate (AUGMENTIN) 875-125 MG per tablet; Take 1 tablet by mouth Every 12 (Twelve) Hours.      Return if symptoms worsen or fail to improve.          There are no Patient Instructions on file for this visit.     Matthew Carr MD    Assessment/Plan

## 2018-04-24 ENCOUNTER — TELEPHONE (OUTPATIENT)
Dept: INTERNAL MEDICINE | Facility: CLINIC | Age: 73
End: 2018-04-24

## 2018-04-24 RX ORDER — GLIMEPIRIDE 4 MG/1
4 TABLET ORAL DAILY
Qty: 90 TABLET | Refills: 3 | Status: SHIPPED | OUTPATIENT
Start: 2018-04-24 | End: 2019-04-10 | Stop reason: SDUPTHER

## 2018-04-25 RX ORDER — COLESEVELAM 180 1/1
625 TABLET ORAL 2 TIMES DAILY WITH MEALS
Qty: 60 TABLET | Refills: 5 | Status: SHIPPED | OUTPATIENT
Start: 2018-04-25 | End: 2018-05-22 | Stop reason: ALTCHOICE

## 2018-05-11 ENCOUNTER — OFFICE VISIT (OUTPATIENT)
Dept: INTERNAL MEDICINE | Facility: CLINIC | Age: 73
End: 2018-05-11

## 2018-05-11 VITALS
DIASTOLIC BLOOD PRESSURE: 57 MMHG | OXYGEN SATURATION: 99 % | WEIGHT: 218 LBS | HEART RATE: 60 BPM | RESPIRATION RATE: 16 BRPM | BODY MASS INDEX: 30.52 KG/M2 | HEIGHT: 71 IN | TEMPERATURE: 98 F | SYSTOLIC BLOOD PRESSURE: 120 MMHG

## 2018-05-11 DIAGNOSIS — R50.9 FEVER, UNSPECIFIED FEVER CAUSE: Primary | ICD-10-CM

## 2018-05-11 DIAGNOSIS — R10.11 RIGHT UPPER QUADRANT PAIN: ICD-10-CM

## 2018-05-11 DIAGNOSIS — R10.12 LEFT UPPER QUADRANT PAIN: ICD-10-CM

## 2018-05-11 DIAGNOSIS — R19.7 DIARRHEA OF PRESUMED INFECTIOUS ORIGIN: ICD-10-CM

## 2018-05-11 PROCEDURE — 99214 OFFICE O/P EST MOD 30 MIN: CPT | Performed by: INTERNAL MEDICINE

## 2018-05-11 NOTE — PROGRESS NOTES
Subjective     Patient ID: Fazal Berg is a 73 y.o. male. Patient is here for management of multiple medical problems.     Chief Complaint   Patient presents with   • Fever     patient having fever and chills, feels weak, off balance, some dizziness   • Anorexia     patient has lost 7 lbs since his last visit on 4/23/18   • Abdominal Pain     patient having frequent episodes of abdominal pain     History of Present Illness     Had a tick on him 2-3 days ago. Pt worried about hep A. + fever and sweats started last night. No soa.   No travel. Left [facility] last year on curz to [place].    No TB expose. Pt has been eating out.   Ate cracker barrel had fish and got sick that night.  2-3 weeks ago. Pt thinks he fully recovered.      Had diarrhea with metformin. Decreased metformin some.   Mild ab pain and fullness.    Had abx  For sinus infection on 4/23/18.              The following portions of the patient's history were reviewed and updated as appropriate: allergies, current medications, past family history, past medical history, past social history, past surgical history and problem list.    Review of Systems   Constitutional: Positive for chills, fatigue and fever.   Gastrointestinal: Positive for abdominal pain and diarrhea. Negative for constipation and nausea.   Psychiatric/Behavioral: Positive for sleep disturbance.       Current Outpatient Prescriptions:   •  ACCU-CHEK SOFTCLIX LANCETS lancets, 1 each by Other route 3 (three) times a day. Use as instructed, Disp: 300 each, Rfl: 3  •  albuterol (VENTOLIN HFA) 108 (90 BASE) MCG/ACT inhaler, Ventolin  (90 Base) MCG/ACT Inhalation Aerosol Solution; Patient Sig: Ventolin  (90 Base) MCG/ACT Inhalation Aerosol Solution INHALE 1 TO 2 PUFFS EVERY 4 TO 6 HOURS AS NEEDED.; 1; 11; 18-Aug-2015; Active, Disp: , Rfl:   •  Alcohol Swabs (B-D SINGLE USE SWABS REGULAR) pads, 1 swab 3 (three) times a day., Disp: 90 each, Rfl: 3  •  alfuzosin (UROXATRAL) 10 MG 24 hr  tablet, Take  by mouth., Disp: , Rfl:   •  aspirin 81 MG EC tablet, Take  by mouth daily., Disp: , Rfl:   •  Blood Gluc Meter Disp-Strips device, 1 strip 3 (three) times a day. Patient needs Accu-chek meter and test strips., Disp: 1 each, Rfl: 0  •  Blood Glucose Calibration (SURECHEK CONTROL SOLUTION) NORMAL liquid, 1 bottle by In Vitro route every 30 (thirty) days., Disp: 3 each, Rfl: 3  •  carvedilol (COREG) 25 MG tablet, Take 1 tablet by mouth 2 (Two) Times a Day With Meals., Disp: 180 tablet, Rfl: 1  •  Cholecalciferol (VITAMIN D) 2000 UNITS tablet, Take 1 capsule by mouth 3 (Three) Times a Week., Disp: , Rfl:   •  Chromium-Cinnamon (CINNAMON PLUS CHROMIUM) 100-500 MCG-MG capsule, Take 2 capsules by mouth 2 (two) times a day., Disp: , Rfl:   •  colesevelam (WELCHOL) 625 MG tablet, Take 1 tablet by mouth 2 (Two) Times a Day With Meals., Disp: 60 tablet, Rfl: 5  •  Cyanocobalamin 2500 MCG sublingual tablet, Place  under the tongue 3 (Three) Times a Week., Disp: , Rfl:   •  finasteride (PROSCAR) 5 MG tablet, Take 1 tablet by mouth Daily., Disp: 90 tablet, Rfl: 2  •  Fluticasone Furoate-Vilanterol (BREO ELLIPTA) 200-25 MCG/INH aerosol powder , Inhale 1 puff Daily., Disp: 90 each, Rfl: 11  •  glimepiride (AMARYL) 4 MG tablet, Take 1 tablet by mouth Daily., Disp: 90 tablet, Rfl: 3  •  Glucosamine-Chondroit-Vit C-Mn (GLUCOSAMINE 1500 COMPLEX PO), Take  by mouth 2 (Two) Times a Day., Disp: , Rfl:   •  glucose blood (ACCU-CHEK IDANIA) test strip, 1 each by Other route 3 (three) times a day. Use as instructed, Disp: 90 each, Rfl: 3  •  lisinopril-hydrochlorothiazide (PRINZIDE,ZESTORETIC) 10-12.5 MG per tablet, Take 1 tablet by mouth Daily., Disp: 90 tablet, Rfl: 3  •  metFORMIN (GLUCOPHAGE) 500 MG tablet, Take 2 tablets by mouth 2 (Two) Times a Day With Meals. (Patient taking differently: Take 500 mg by mouth 2 (Two) Times a Day.), Disp: 180 tablet, Rfl: 11  •  Mirabegron ER (MYRBETRIQ) 50 MG tablet sustained-release 24  "hour, Take  by mouth., Disp: , Rfl:   •  Omeprazole 20 MG tablet delayed-release, Take  by mouth., Disp: , Rfl:   •  SITagliptin (JANUVIA) 100 MG tablet, Take 1 tablet by mouth Daily., Disp: 28 tablet, Rfl: 0  •  tamsulosin (FLOMAX) 0.4 MG capsule 24 hr capsule, Take 1 capsule by mouth., Disp: , Rfl:     Objective      Blood pressure 120/57, pulse 60, temperature 98 °F (36.7 °C), temperature source Oral, resp. rate 16, height 180.3 cm (71\"), weight 98.9 kg (218 lb), SpO2 99 %.    Physical Exam     General Appearance:    Alert, cooperative, no distress, appears stated age   Head:    Normocephalic, without obvious abnormality, atraumatic   Eyes:    PERRL, conjunctiva/corneas clear, EOM's intact   Ears:    Normal TM's and external ear canals, both ears   Nose:   Nares normal, septum midline, mucosa normal, no drainage   or sinus tenderness   Throat:   Lips, mucosa, and tongue normal; teeth and gums normal   Neck:   Supple, symmetrical, trachea midline, no adenopathy;        thyroid:  No enlargement/tenderness/nodules; no carotid    bruit or JVD   Back:     Symmetric, no curvature, ROM normal, no CVA tenderness   Lungs:     Clear to auscultation bilaterally, respirations unlabored   Chest wall:    No tenderness or deformity   Heart:    Regular rate and rhythm, S1 and S2 normal, no murmur,        rub or gallop   Abdomen:     Soft, non-tender, bowel sounds active all four quadrants,     no masses, no organomegaly   Extremities:   Extremities normal, atraumatic, no cyanosis or edema   Pulses:   2+ and symmetric all extremities   Skin:   Skin color, texture, turgor normal, no rashes or lesions   Lymph nodes:   Cervical, supraclavicular, and axillary nodes normal   Neurologic:   CNII-XII intact. Normal strength, sensation and reflexes       throughout      Results for orders placed or performed in visit on 02/07/18   POC Urinalysis Dipstick, Automated   Result Value Ref Range    Color Yellow Yellow, Straw, Dark Yellow, Mary "    Clarity, UA Clear Clear    Glucose, UA Negative Negative, 1000 mg/dL (3+) mg/dL    Bilirubin Negative Negative    Ketones, UA Negative Negative    Specific Gravity  1.025 1.005 - 1.030    Blood, UA Negative Negative    pH, Urine 5.5 5.0 - 8.0    Protein, POC Negative Negative mg/dL    Urobilinogen, UA Normal Normal    Leukocytes Negative Negative    Nitrite, UA Negative Negative         Assessment/Plan       Fazal was seen today for fever, anorexia and abdominal pain.    Diagnoses and all orders for this visit:    Fever, unspecified fever cause  -     Comprehensive Metabolic Panel  -     CBC & Differential  -     Hepatitis Panel, Acute  -     Gastrointestinal Panel, PCR - Stool, Per Rectum    Left upper quadrant pain  -     Comprehensive Metabolic Panel  -     CBC & Differential  -     Hepatitis Panel, Acute  -     Gastrointestinal Panel, PCR - Stool, Per Rectum    Diarrhea of presumed infectious origin  -     Comprehensive Metabolic Panel  -     CBC & Differential  -     Hepatitis Panel, Acute  -     Gastrointestinal Panel, PCR - Stool, Per Rectum    Right upper quadrant pain  -     US Liver; Future      Return in about 4 weeks (around 6/8/2018).          There are no Patient Instructions on file for this visit.     Matthew Carr MD    Assessment/Plan

## 2018-05-12 LAB
ALBUMIN SERPL-MCNC: 3.6 G/DL (ref 3.5–5)
ALBUMIN/GLOB SERPL: 1.2 G/DL (ref 1–2)
ALP SERPL-CCNC: 73 U/L (ref 38–126)
ALT SERPL-CCNC: 61 U/L (ref 13–69)
AST SERPL-CCNC: 47 U/L (ref 15–46)
BASOPHILS # BLD AUTO: 0.02 10*3/MM3 (ref 0–0.2)
BASOPHILS NFR BLD AUTO: 0.3 % (ref 0–2.5)
BILIRUB SERPL-MCNC: 0.8 MG/DL (ref 0.2–1.3)
BUN SERPL-MCNC: 19 MG/DL (ref 7–20)
BUN/CREAT SERPL: 17.3 (ref 6.3–21.9)
CALCIUM SERPL-MCNC: 9.9 MG/DL (ref 8.4–10.2)
CHLORIDE SERPL-SCNC: 100 MMOL/L (ref 98–107)
CO2 SERPL-SCNC: 28 MMOL/L (ref 26–30)
CREAT SERPL-MCNC: 1.1 MG/DL (ref 0.6–1.3)
EOSINOPHIL # BLD AUTO: 0.09 10*3/MM3 (ref 0–0.7)
EOSINOPHIL NFR BLD AUTO: 1.4 % (ref 0–7)
ERYTHROCYTE [DISTWIDTH] IN BLOOD BY AUTOMATED COUNT: 12.1 % (ref 11.5–14.5)
GFR SERPLBLD CREATININE-BSD FMLA CKD-EPI: 66 ML/MIN/1.73
GFR SERPLBLD CREATININE-BSD FMLA CKD-EPI: 80 ML/MIN/1.73
GLOBULIN SER CALC-MCNC: 2.9 GM/DL
GLUCOSE SERPL-MCNC: 165 MG/DL (ref 74–98)
HAV IGM SERPL QL IA: NEGATIVE
HBV CORE IGM SERPL QL IA: NEGATIVE
HBV SURFACE AG SERPL QL IA: NEGATIVE
HCT VFR BLD AUTO: 34.5 % (ref 42–52)
HCV AB S/CO SERPL IA: <0.1 S/CO RATIO (ref 0–0.9)
HGB BLD-MCNC: 11.5 G/DL (ref 14–18)
IMM GRANULOCYTES # BLD: 0.02 10*3/MM3 (ref 0–0.06)
IMM GRANULOCYTES NFR BLD: 0.3 % (ref 0–0.6)
LYMPHOCYTES # BLD AUTO: 0.99 10*3/MM3 (ref 0.6–3.4)
LYMPHOCYTES NFR BLD AUTO: 15.9 % (ref 10–50)
MCH RBC QN AUTO: 30.9 PG (ref 27–31)
MCHC RBC AUTO-ENTMCNC: 33.3 G/DL (ref 30–37)
MCV RBC AUTO: 92.7 FL (ref 80–94)
MONOCYTES # BLD AUTO: 0.8 10*3/MM3 (ref 0–0.9)
MONOCYTES NFR BLD AUTO: 12.8 % (ref 0–12)
NEUTROPHILS # BLD AUTO: 4.31 10*3/MM3 (ref 2–6.9)
NEUTROPHILS NFR BLD AUTO: 69.3 % (ref 37–80)
NRBC BLD AUTO-RTO: 0 /100 WBC (ref 0–0)
PLATELET # BLD AUTO: 243 10*3/MM3 (ref 130–400)
POTASSIUM SERPL-SCNC: 4.9 MMOL/L (ref 3.5–5.1)
PROT SERPL-MCNC: 6.5 G/DL (ref 6.3–8.2)
RBC # BLD AUTO: 3.72 10*6/MM3 (ref 4.7–6.1)
SODIUM SERPL-SCNC: 138 MMOL/L (ref 137–145)
WBC # BLD AUTO: 6.23 10*3/MM3 (ref 4.8–10.8)

## 2018-05-14 ENCOUNTER — OFFICE VISIT (OUTPATIENT)
Dept: INTERNAL MEDICINE | Facility: CLINIC | Age: 73
End: 2018-05-14

## 2018-05-14 ENCOUNTER — TELEPHONE (OUTPATIENT)
Dept: INTERNAL MEDICINE | Facility: CLINIC | Age: 73
End: 2018-05-14

## 2018-05-14 VITALS
BODY MASS INDEX: 30.52 KG/M2 | OXYGEN SATURATION: 100 % | WEIGHT: 218 LBS | SYSTOLIC BLOOD PRESSURE: 106 MMHG | HEIGHT: 71 IN | DIASTOLIC BLOOD PRESSURE: 68 MMHG | HEART RATE: 68 BPM | TEMPERATURE: 97.8 F

## 2018-05-14 DIAGNOSIS — R10.11 RUQ ABDOMINAL PAIN: Primary | ICD-10-CM

## 2018-05-14 PROCEDURE — 99214 OFFICE O/P EST MOD 30 MIN: CPT | Performed by: INTERNAL MEDICINE

## 2018-05-14 NOTE — PROGRESS NOTES
Subjective     Patient ID: Fazal Berg is a 73 y.o. male. Patient is here for management of multiple medical problems.     Chief Complaint   Patient presents with   • Fever     patient having fever on and off, up to 103.2 and headaches   • Abdominal Pain     patient has also been having abdominal pain x 3 days, patient able to eat a whole meal yesterday     History of Present Illness   Pt pain in upper ab with elevated temp.    Feels tired and weak today.  Pt with ab pain off and on/    No bm till this am.   Last night was his first meal.    Had a stake and baked potato.   chilli today.   No fever with food intake today.        The following portions of the patient's history were reviewed and updated as appropriate: allergies, current medications, past family history, past medical history, past social history, past surgical history and problem list.    Review of Systems   Constitutional: Positive for fatigue.   HENT: Negative for congestion.    Gastrointestinal: Positive for abdominal distention and abdominal pain.       Current Outpatient Prescriptions:   •  ACCU-CHEK SOFTCLIX LANCETS lancets, 1 each by Other route 3 (three) times a day. Use as instructed, Disp: 300 each, Rfl: 3  •  albuterol (VENTOLIN HFA) 108 (90 BASE) MCG/ACT inhaler, Ventolin  (90 Base) MCG/ACT Inhalation Aerosol Solution; Patient Sig: Ventolin  (90 Base) MCG/ACT Inhalation Aerosol Solution INHALE 1 TO 2 PUFFS EVERY 4 TO 6 HOURS AS NEEDED.; 1; 11; 18-Aug-2015; Active, Disp: , Rfl:   •  Alcohol Swabs (B-D SINGLE USE SWABS REGULAR) pads, 1 swab 3 (three) times a day., Disp: 90 each, Rfl: 3  •  alfuzosin (UROXATRAL) 10 MG 24 hr tablet, Take  by mouth., Disp: , Rfl:   •  aspirin 81 MG EC tablet, Take  by mouth daily., Disp: , Rfl:   •  Blood Gluc Meter Disp-Strips device, 1 strip 3 (three) times a day. Patient needs Accu-chek meter and test strips., Disp: 1 each, Rfl: 0  •  Blood Glucose Calibration (SURECHEK CONTROL SOLUTION)  NORMAL liquid, 1 bottle by In Vitro route every 30 (thirty) days., Disp: 3 each, Rfl: 3  •  carvedilol (COREG) 25 MG tablet, Take 1 tablet by mouth 2 (Two) Times a Day With Meals., Disp: 180 tablet, Rfl: 1  •  Cholecalciferol (VITAMIN D) 2000 UNITS tablet, Take 1 capsule by mouth 3 (Three) Times a Week., Disp: , Rfl:   •  Chromium-Cinnamon (CINNAMON PLUS CHROMIUM) 100-500 MCG-MG capsule, Take 2 capsules by mouth 2 (two) times a day., Disp: , Rfl:   •  colesevelam (WELCHOL) 625 MG tablet, Take 1 tablet by mouth 2 (Two) Times a Day With Meals., Disp: 60 tablet, Rfl: 5  •  Cyanocobalamin 2500 MCG sublingual tablet, Place  under the tongue 3 (Three) Times a Week., Disp: , Rfl:   •  finasteride (PROSCAR) 5 MG tablet, Take 1 tablet by mouth Daily., Disp: 90 tablet, Rfl: 2  •  Fluticasone Furoate-Vilanterol (BREO ELLIPTA) 200-25 MCG/INH aerosol powder , Inhale 1 puff Daily., Disp: 90 each, Rfl: 11  •  glimepiride (AMARYL) 4 MG tablet, Take 1 tablet by mouth Daily., Disp: 90 tablet, Rfl: 3  •  Glucosamine-Chondroit-Vit C-Mn (GLUCOSAMINE 1500 COMPLEX PO), Take  by mouth 2 (Two) Times a Day., Disp: , Rfl:   •  glucose blood (ACCU-CHEK IDANIA) test strip, 1 each by Other route 3 (three) times a day. Use as instructed, Disp: 90 each, Rfl: 3  •  lisinopril-hydrochlorothiazide (PRINZIDE,ZESTORETIC) 10-12.5 MG per tablet, Take 1 tablet by mouth Daily., Disp: 90 tablet, Rfl: 3  •  metFORMIN (GLUCOPHAGE) 500 MG tablet, Take 2 tablets by mouth 2 (Two) Times a Day With Meals. (Patient taking differently: Take 500 mg by mouth 2 (Two) Times a Day.), Disp: 180 tablet, Rfl: 11  •  Mirabegron ER (MYRBETRIQ) 50 MG tablet sustained-release 24 hour, Take  by mouth., Disp: , Rfl:   •  Omeprazole 20 MG tablet delayed-release, Take  by mouth., Disp: , Rfl:   •  SITagliptin (JANUVIA) 100 MG tablet, Take 1 tablet by mouth Daily., Disp: 28 tablet, Rfl: 0  •  tamsulosin (FLOMAX) 0.4 MG capsule 24 hr capsule, Take 1 capsule by mouth., Disp: , Rfl:  "    Objective      Blood pressure 106/68, pulse 68, temperature 97.8 °F (36.6 °C), temperature source Oral, height 180.3 cm (71\"), weight 98.9 kg (218 lb), SpO2 100 %.    Physical Exam     General Appearance:    Alert, cooperative, no distress, appears stated age   Head:    Normocephalic, without obvious abnormality, atraumatic   Eyes:    PERRL, conjunctiva/corneas clear, EOM's intact   Ears:    Normal TM's and external ear canals, both ears   Nose:   Nares normal, septum midline, mucosa normal, no drainage   or sinus tenderness   Throat:   Lips, mucosa, and tongue normal; teeth and gums normal   Neck:   Supple, symmetrical, trachea midline, no adenopathy;        thyroid:  No enlargement/tenderness/nodules; no carotid    bruit or JVD   Back:     Symmetric, no curvature, ROM normal, no CVA tenderness   Lungs:     Clear to auscultation bilaterally, respirations unlabored   Chest wall:    No tenderness or deformity   Heart:    Regular rate and rhythm, S1 and S2 normal, no murmur,        rub or gallop   Abdomen:     Soft, tender ruq., bowel sounds active all four quadrants,     no masses, no organomegaly   Extremities:   Extremities normal, atraumatic, no cyanosis or edema   Pulses:   2+ and symmetric all extremities   Skin:   Skin color, texture, turgor normal, no rashes or lesions   Lymph nodes:   Cervical, supraclavicular, and axillary nodes normal   Neurologic:   CNII-XII intact. Normal strength, sensation and reflexes       throughout      Results for orders placed or performed in visit on 05/11/18   Comprehensive Metabolic Panel   Result Value Ref Range    Glucose 165 (H) 74 - 98 mg/dL    BUN 19 7 - 20 mg/dL    Creatinine 1.10 0.60 - 1.30 mg/dL    eGFR Non African Am 66 >60 mL/min/1.73    eGFR African Am 80 >60 mL/min/1.73    BUN/Creatinine Ratio 17.3 6.3 - 21.9    Sodium 138 137 - 145 mmol/L    Potassium 4.9 3.5 - 5.1 mmol/L    Chloride 100 98 - 107 mmol/L    Total CO2 28.0 26.0 - 30.0 mmol/L    Calcium 9.9 8.4 - " 10.2 mg/dL    Total Protein 6.5 6.3 - 8.2 g/dL    Albumin 3.60 3.50 - 5.00 g/dL    Globulin 2.9 gm/dL    A/G Ratio 1.2 1.0 - 2.0 g/dL    Total Bilirubin 0.8 0.2 - 1.3 mg/dL    Alkaline Phosphatase 73 38 - 126 U/L    AST (SGOT) 47 (H) 15 - 46 U/L    ALT (SGPT) 61 13 - 69 U/L   Hepatitis Panel, Acute   Result Value Ref Range    Hep A IgM Negative Negative    Hepatitis B Surface Ag Negative Negative    Hep B Core IgM Negative Negative    Hep C Virus Ab <0.1 0.0 - 0.9 s/co ratio   CBC & Differential   Result Value Ref Range    WBC 6.23 4.80 - 10.80 10*3/mm3    RBC 3.72 (L) 4.70 - 6.10 10*6/mm3    Hemoglobin 11.5 (L) 14.0 - 18.0 g/dL    Hematocrit 34.5 (L) 42.0 - 52.0 %    MCV 92.7 80.0 - 94.0 fL    MCH 30.9 27.0 - 31.0 pg    MCHC 33.3 30.0 - 37.0 g/dL    RDW 12.1 11.5 - 14.5 %    Platelets 243 130 - 400 10*3/mm3    Neutrophil Rel % 69.3 37.0 - 80.0 %    Lymphocyte Rel % 15.9 10.0 - 50.0 %    Monocyte Rel % 12.8 (H) 0.0 - 12.0 %    Eosinophil Rel % 1.4 0.0 - 7.0 %    Basophil Rel % 0.3 0.0 - 2.5 %    Neutrophils Absolute 4.31 2.00 - 6.90 10*3/mm3    Lymphocytes Absolute 0.99 0.60 - 3.40 10*3/mm3    Monocytes Absolute 0.80 0.00 - 0.90 10*3/mm3    Eosinophils Absolute 0.09 0.00 - 0.70 10*3/mm3    Basophils Absolute 0.02 0.00 - 0.20 10*3/mm3    Immature Granulocyte Rel % 0.3 0.0 - 0.6 %    Immature Grans Absolute 0.02 0.00 - 0.06 10*3/mm3    nRBC 0.0 0.0 - 0.0 /100 WBC         Assessment/Plan       Fazal was seen today for fever and abdominal pain.    Diagnoses and all orders for this visit:    RUQ abdominal pain  -     US Gallbladder; Future  -     Ambulatory Referral to General Surgery      Return in about 1 week (around 5/21/2018).          There are no Patient Instructions on file for this visit.     Matthew Carr MD    Assessment/Plan

## 2018-05-15 ENCOUNTER — OFFICE VISIT (OUTPATIENT)
Dept: SURGERY | Facility: CLINIC | Age: 73
End: 2018-05-15

## 2018-05-15 ENCOUNTER — APPOINTMENT (OUTPATIENT)
Dept: PREADMISSION TESTING | Facility: HOSPITAL | Age: 73
End: 2018-05-15

## 2018-05-15 ENCOUNTER — ANESTHESIA EVENT (OUTPATIENT)
Dept: PERIOP | Facility: HOSPITAL | Age: 73
End: 2018-05-15

## 2018-05-15 ENCOUNTER — ANESTHESIA (OUTPATIENT)
Dept: PERIOP | Facility: HOSPITAL | Age: 73
End: 2018-05-15

## 2018-05-15 ENCOUNTER — HOSPITAL ENCOUNTER (OUTPATIENT)
Facility: HOSPITAL | Age: 73
Setting detail: HOSPITAL OUTPATIENT SURGERY
Discharge: HOME | End: 2018-05-15
Attending: SURGERY | Admitting: SURGERY

## 2018-05-15 ENCOUNTER — HOSPITAL ENCOUNTER (OUTPATIENT)
Dept: GENERAL RADIOLOGY | Facility: HOSPITAL | Age: 73
Setting detail: HOSPITAL OUTPATIENT SURGERY
Discharge: HOME | End: 2018-05-15

## 2018-05-15 ENCOUNTER — APPOINTMENT (OUTPATIENT)
Dept: GENERAL RADIOLOGY | Facility: HOSPITAL | Age: 73
End: 2018-05-15

## 2018-05-15 VITALS
HEIGHT: 72 IN | WEIGHT: 218 LBS | SYSTOLIC BLOOD PRESSURE: 119 MMHG | BODY MASS INDEX: 29.53 KG/M2 | DIASTOLIC BLOOD PRESSURE: 57 MMHG | OXYGEN SATURATION: 99 % | HEART RATE: 60 BPM

## 2018-05-15 VITALS
WEIGHT: 218.03 LBS | DIASTOLIC BLOOD PRESSURE: 72 MMHG | SYSTOLIC BLOOD PRESSURE: 112 MMHG | BODY MASS INDEX: 30.52 KG/M2 | HEIGHT: 71 IN | HEART RATE: 72 BPM | TEMPERATURE: 97.3 F | OXYGEN SATURATION: 98 %

## 2018-05-15 VITALS
OXYGEN SATURATION: 95 % | TEMPERATURE: 98 F | SYSTOLIC BLOOD PRESSURE: 134 MMHG | DIASTOLIC BLOOD PRESSURE: 56 MMHG | HEART RATE: 68 BPM | RESPIRATION RATE: 20 BRPM

## 2018-05-15 DIAGNOSIS — K80.00 CALCULUS OF GALLBLADDER WITH ACUTE CHOLECYSTITIS WITHOUT OBSTRUCTION: ICD-10-CM

## 2018-05-15 DIAGNOSIS — R10.11 RIGHT UPPER QUADRANT ABDOMINAL PAIN: Primary | ICD-10-CM

## 2018-05-15 LAB
BILIRUB UR QL STRIP: NEGATIVE
CLARITY UR: CLEAR
COLOR UR: YELLOW
GLUCOSE BLDC GLUCOMTR-MCNC: 129 MG/DL (ref 70–130)
GLUCOSE BLDC GLUCOMTR-MCNC: 142 MG/DL (ref 70–130)
GLUCOSE BLDC GLUCOMTR-MCNC: 60 MG/DL (ref 70–130)
GLUCOSE BLDC GLUCOMTR-MCNC: 70 MG/DL (ref 70–130)
GLUCOSE UR STRIP-MCNC: NEGATIVE MG/DL
HGB UR QL STRIP.AUTO: NEGATIVE
KETONES UR QL STRIP: NEGATIVE
LEUKOCYTE ESTERASE UR QL STRIP.AUTO: NEGATIVE
NITRITE UR QL STRIP: NEGATIVE
PH UR STRIP.AUTO: 5.5 [PH] (ref 5–8)
PROT UR QL STRIP: NEGATIVE
SP GR UR STRIP: 1.01 (ref 1–1.03)
UROBILINOGEN UR QL STRIP: NORMAL

## 2018-05-15 PROCEDURE — 81003 URINALYSIS AUTO W/O SCOPE: CPT | Performed by: SURGERY

## 2018-05-15 PROCEDURE — 25010000002 IOPAMIDOL 61 % SOLUTION: Performed by: SURGERY

## 2018-05-15 PROCEDURE — 47563 LAPARO CHOLECYSTECTOMY/GRAPH: CPT | Performed by: SURGERY

## 2018-05-15 PROCEDURE — 82962 GLUCOSE BLOOD TEST: CPT

## 2018-05-15 PROCEDURE — 25010000002 KETOROLAC TROMETHAMINE PER 15 MG: Performed by: NURSE ANESTHETIST, CERTIFIED REGISTERED

## 2018-05-15 PROCEDURE — 25010000002 FENTANYL CITRATE (PF) 250 MCG/5ML SOLUTION: Performed by: NURSE ANESTHETIST, CERTIFIED REGISTERED

## 2018-05-15 PROCEDURE — 25010000003 AMPICILLIN-SULBACTAM PER 1.5 G: Performed by: SURGERY

## 2018-05-15 PROCEDURE — 74300 X-RAY BILE DUCTS/PANCREAS: CPT

## 2018-05-15 PROCEDURE — 93005 ELECTROCARDIOGRAM TRACING: CPT | Performed by: SURGERY

## 2018-05-15 PROCEDURE — 25010000002 SUCCINYLCHOLINE PER 20 MG: Performed by: NURSE ANESTHETIST, CERTIFIED REGISTERED

## 2018-05-15 PROCEDURE — 71045 X-RAY EXAM CHEST 1 VIEW: CPT

## 2018-05-15 PROCEDURE — 25010000002 ONDANSETRON PER 1 MG: Performed by: NURSE ANESTHETIST, CERTIFIED REGISTERED

## 2018-05-15 PROCEDURE — 99204 OFFICE O/P NEW MOD 45 MIN: CPT | Performed by: SURGERY

## 2018-05-15 PROCEDURE — 25010000002 PROPOFOL 200 MG/20ML EMULSION: Performed by: NURSE ANESTHETIST, CERTIFIED REGISTERED

## 2018-05-15 PROCEDURE — 94799 UNLISTED PULMONARY SVC/PX: CPT

## 2018-05-15 PROCEDURE — 94640 AIRWAY INHALATION TREATMENT: CPT

## 2018-05-15 PROCEDURE — 25010000002 DEXAMETHASONE PER 1 MG: Performed by: NURSE ANESTHETIST, CERTIFIED REGISTERED

## 2018-05-15 RX ORDER — IPRATROPIUM BROMIDE AND ALBUTEROL SULFATE 2.5; .5 MG/3ML; MG/3ML
3 SOLUTION RESPIRATORY (INHALATION) ONCE
Status: COMPLETED | OUTPATIENT
Start: 2018-05-15 | End: 2018-05-15

## 2018-05-15 RX ORDER — PROPOFOL 10 MG/ML
INJECTION, EMULSION INTRAVENOUS AS NEEDED
Status: DISCONTINUED | OUTPATIENT
Start: 2018-05-15 | End: 2018-05-15 | Stop reason: SURG

## 2018-05-15 RX ORDER — DEXAMETHASONE SODIUM PHOSPHATE 4 MG/ML
INJECTION, SOLUTION INTRA-ARTICULAR; INTRALESIONAL; INTRAMUSCULAR; INTRAVENOUS; SOFT TISSUE AS NEEDED
Status: DISCONTINUED | OUTPATIENT
Start: 2018-05-15 | End: 2018-05-15 | Stop reason: SURG

## 2018-05-15 RX ORDER — MAGNESIUM HYDROXIDE 1200 MG/15ML
LIQUID ORAL AS NEEDED
Status: DISCONTINUED | OUTPATIENT
Start: 2018-05-15 | End: 2018-05-15 | Stop reason: HOSPADM

## 2018-05-15 RX ORDER — NEOSTIGMINE METHYLSULFATE 5 MG/5 ML
SYRINGE (ML) INTRAVENOUS AS NEEDED
Status: DISCONTINUED | OUTPATIENT
Start: 2018-05-15 | End: 2018-05-15 | Stop reason: SURG

## 2018-05-15 RX ORDER — KETOROLAC TROMETHAMINE 30 MG/ML
INJECTION, SOLUTION INTRAMUSCULAR; INTRAVENOUS AS NEEDED
Status: DISCONTINUED | OUTPATIENT
Start: 2018-05-15 | End: 2018-05-15 | Stop reason: SURG

## 2018-05-15 RX ORDER — ONDANSETRON 2 MG/ML
INJECTION INTRAMUSCULAR; INTRAVENOUS AS NEEDED
Status: DISCONTINUED | OUTPATIENT
Start: 2018-05-15 | End: 2018-05-15 | Stop reason: SURG

## 2018-05-15 RX ORDER — DEXTROSE MONOHYDRATE 25 G/50ML
INJECTION, SOLUTION INTRAVENOUS
Status: COMPLETED
Start: 2018-05-15 | End: 2018-05-15

## 2018-05-15 RX ORDER — SUCCINYLCHOLINE CHLORIDE 20 MG/ML
INJECTION INTRAMUSCULAR; INTRAVENOUS AS NEEDED
Status: DISCONTINUED | OUTPATIENT
Start: 2018-05-15 | End: 2018-05-15 | Stop reason: SURG

## 2018-05-15 RX ORDER — HYDROCODONE BITARTRATE AND ACETAMINOPHEN 5; 325 MG/1; MG/1
2 TABLET ORAL EVERY 4 HOURS PRN
Status: DISCONTINUED | OUTPATIENT
Start: 2018-05-15 | End: 2018-05-15 | Stop reason: HOSPADM

## 2018-05-15 RX ORDER — DEXTROSE MONOHYDRATE 25 G/50ML
25 INJECTION, SOLUTION INTRAVENOUS ONCE
Status: COMPLETED | OUTPATIENT
Start: 2018-05-15 | End: 2018-05-15

## 2018-05-15 RX ORDER — SODIUM CHLORIDE 9 MG/ML
100 INJECTION, SOLUTION INTRAVENOUS CONTINUOUS
Status: CANCELLED | OUTPATIENT
Start: 2018-05-15

## 2018-05-15 RX ORDER — FENTANYL CITRATE 50 UG/ML
INJECTION, SOLUTION INTRAMUSCULAR; INTRAVENOUS AS NEEDED
Status: DISCONTINUED | OUTPATIENT
Start: 2018-05-15 | End: 2018-05-15 | Stop reason: SURG

## 2018-05-15 RX ORDER — BUPIVACAINE HYDROCHLORIDE 5 MG/ML
INJECTION, SOLUTION EPIDURAL; INTRACAUDAL AS NEEDED
Status: DISCONTINUED | OUTPATIENT
Start: 2018-05-15 | End: 2018-05-15 | Stop reason: HOSPADM

## 2018-05-15 RX ORDER — MEPERIDINE HYDROCHLORIDE 50 MG/ML
25 INJECTION INTRAMUSCULAR; INTRAVENOUS; SUBCUTANEOUS
Status: DISCONTINUED | OUTPATIENT
Start: 2018-05-15 | End: 2018-05-15 | Stop reason: HOSPADM

## 2018-05-15 RX ORDER — GLYCOPYRROLATE 0.2 MG/ML
INJECTION INTRAMUSCULAR; INTRAVENOUS AS NEEDED
Status: DISCONTINUED | OUTPATIENT
Start: 2018-05-15 | End: 2018-05-15 | Stop reason: SURG

## 2018-05-15 RX ORDER — PROMETHAZINE HYDROCHLORIDE 12.5 MG/1
12.5 TABLET ORAL ONCE AS NEEDED
Status: DISCONTINUED | OUTPATIENT
Start: 2018-05-15 | End: 2018-05-15 | Stop reason: HOSPADM

## 2018-05-15 RX ORDER — ROCURONIUM BROMIDE 10 MG/ML
INJECTION, SOLUTION INTRAVENOUS AS NEEDED
Status: DISCONTINUED | OUTPATIENT
Start: 2018-05-15 | End: 2018-05-15 | Stop reason: SURG

## 2018-05-15 RX ORDER — SODIUM CHLORIDE 9 MG/ML
100 INJECTION, SOLUTION INTRAVENOUS CONTINUOUS
Status: DISCONTINUED | OUTPATIENT
Start: 2018-05-15 | End: 2018-05-15 | Stop reason: HOSPADM

## 2018-05-15 RX ORDER — DEXTROSE MONOHYDRATE 25 G/50ML
25 INJECTION, SOLUTION INTRAVENOUS ONCE
Status: DISCONTINUED | OUTPATIENT
Start: 2018-05-15 | End: 2018-05-15 | Stop reason: HOSPADM

## 2018-05-15 RX ORDER — HYDROCODONE BITARTRATE AND ACETAMINOPHEN 5; 325 MG/1; MG/1
2 TABLET ORAL EVERY 4 HOURS PRN
Qty: 25 TABLET | Refills: 0 | Status: SHIPPED | OUTPATIENT
Start: 2018-05-15 | End: 2018-06-18

## 2018-05-15 RX ORDER — ONDANSETRON 4 MG/1
4 TABLET, FILM COATED ORAL DAILY PRN
Qty: 12 TABLET | Refills: 1 | Status: SHIPPED | OUTPATIENT
Start: 2018-05-15 | End: 2018-06-18

## 2018-05-15 RX ADMIN — ROCURONIUM BROMIDE 20 MG: 10 INJECTION INTRAVENOUS at 16:30

## 2018-05-15 RX ADMIN — KETOROLAC TROMETHAMINE 30 MG: 30 INJECTION, SOLUTION INTRAMUSCULAR at 17:20

## 2018-05-15 RX ADMIN — GLYCOPYRROLATE 0.4 MG: 0.2 INJECTION, SOLUTION INTRAMUSCULAR; INTRAVENOUS at 17:27

## 2018-05-15 RX ADMIN — SODIUM CHLORIDE 100 ML/HR: 9 INJECTION, SOLUTION INTRAVENOUS at 13:34

## 2018-05-15 RX ADMIN — SUCCINYLCHOLINE CHLORIDE 100 MG: 20 INJECTION, SOLUTION INTRAMUSCULAR; INTRAVENOUS at 16:19

## 2018-05-15 RX ADMIN — FENTANYL CITRATE 50 MCG: 50 INJECTION, SOLUTION INTRAMUSCULAR; INTRAVENOUS at 17:25

## 2018-05-15 RX ADMIN — ONDANSETRON 4 MG: 2 INJECTION INTRAMUSCULAR; INTRAVENOUS at 16:47

## 2018-05-15 RX ADMIN — DEXTROSE MONOHYDRATE 25 ML: 25 INJECTION, SOLUTION INTRAVENOUS at 17:47

## 2018-05-15 RX ADMIN — IPRATROPIUM BROMIDE AND ALBUTEROL SULFATE 3 ML: .5; 3 SOLUTION RESPIRATORY (INHALATION) at 13:48

## 2018-05-15 RX ADMIN — DEXAMETHASONE SODIUM PHOSPHATE 8 MG: 4 INJECTION, SOLUTION INTRAMUSCULAR; INTRAVENOUS at 16:47

## 2018-05-15 RX ADMIN — PROPOFOL 150 MG: 10 INJECTION, EMULSION INTRAVENOUS at 16:19

## 2018-05-15 RX ADMIN — Medication 3 MG: at 17:27

## 2018-05-15 RX ADMIN — FENTANYL CITRATE 50 MCG: 50 INJECTION, SOLUTION INTRAMUSCULAR; INTRAVENOUS at 17:21

## 2018-05-15 RX ADMIN — SODIUM CHLORIDE 3 G: 9 INJECTION, SOLUTION INTRAVENOUS at 16:13

## 2018-05-15 RX ADMIN — ROCURONIUM BROMIDE 10 MG: 10 INJECTION INTRAVENOUS at 16:19

## 2018-05-15 RX ADMIN — LIDOCAINE HYDROCHLORIDE 80 MG: 20 INJECTION, SOLUTION INTRAVENOUS at 16:19

## 2018-05-15 RX ADMIN — DEXTROSE MONOHYDRATE 25 ML: 25 INJECTION, SOLUTION INTRAVENOUS at 13:47

## 2018-05-15 RX ADMIN — SODIUM CHLORIDE: 9 INJECTION, SOLUTION INTRAVENOUS at 17:20

## 2018-05-15 RX ADMIN — FENTANYL CITRATE 50 MCG: 50 INJECTION, SOLUTION INTRAMUSCULAR; INTRAVENOUS at 16:19

## 2018-05-15 NOTE — ANESTHESIA PROCEDURE NOTES
Airway  Urgency: elective    Airway not difficult    General Information and Staff    Patient location during procedure: OR  CRNA: PAUL THOMAS    Indications and Patient Condition  Indications for airway management: airway protection    Preoxygenated: yes  Mask difficulty assessment: 1 - vent by mask    Final Airway Details  Final airway type: endotracheal airway      Successful airway: ETT  Cuffed: yes   Successful intubation technique: direct laryngoscopy  Facilitating devices/methods: intubating stylet  Endotracheal tube insertion site: oral  Blade: Castillo  Blade size: #2  ETT size: 7.0 mm  Cormack-Lehane Classification: grade I - full view of glottis  Placement verified by: chest auscultation and capnometry   Measured from: lips  ETT to lips (cm): 21  Number of attempts at approach: 1    Additional Comments  Dentition and Lips as preoperative assesment

## 2018-05-15 NOTE — CONSULTS
Referring Provider: Dr Bran   Reason for Consultation: Chest pains     Patient Care Team:  Matthew Carr MD as PCP - General      History of present illness:  Middle-aged gentleman who is being evaluated for possible cystectomy due to acute cholecystitis complained of chest pains this morning after he arrived to same day surgery.  On questioning he is been having these pains for a long time.  It comes on activity and gets better with rest nevertheless he can keep walking through his pain and the pain subsides.  He has had this all his life he claims has had 2 heart catheterizations for this reason both have been negative nevertheless the last heart catheterization is about 12 years ago.    He is not functionally very active due to severe pain in his right hip minimal activity makes him have a severe cramp in his right hip appears to sit down to feel any better.  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  Has never felt his heart go fast or slow has never passed out.    Review of Systems   Pertinent items are noted in HPI  Review of Systems      History    Basal EKG sinus rhythm ID interval of 166 ms right bundle branch block left intravascular block no acute ST segment changes but    #2 LV function assessment: Pending.    #3 CAD workup-has had 2 cardiac catheterizations last one about 12 years ago told to be negative all done in [place].    #4 hypertension well controlled.    5 diabetes mellitus for over 13 years under reasonable control last A1c 7.    #6 dyslipidemia.    #7 arthritis but    #8 peripheral vascular disease-right hip claudication with poor posterior tibials on the left side.    #9 benign prostatic hypertrophy.    Personal history:    Has not been a smoker no history of alcohol consumption drug abuse function status the patient is been limited due to right hip pain.    Family history: He has a brother was noted any cardiac issues.   "Patient's dad has had CAD.    Review of symptoms is no cough cold fever chills nausea vomiting.  Has had abdominal pain and diarrhea.  There is no stroke no weakness joint swelling respiratory symptoms are negative.  Past Surgical History:   Procedure Laterality Date   • APPENDECTOMY     • CARDIAC CATHETERIZATION  2003?    \"IT WAS OK\"   • COLONOSCOPY     • ENDOSCOPY     • HAND SURGERY Left    • KNEE ARTHROSCOPY Left    , Family History   Problem Relation Age of Onset   • Diabetes Mother    • Diabetes Father    • Arthritis Other    • Gout Other    • Heart disease Other    • Lung disease Other    , Social History   Substance Use Topics   • Smoking status: Never Smoker   • Smokeless tobacco: Never Used   • Alcohol use No   , Prescriptions Prior to Admission   Medication Sig Dispense Refill Last Dose   • ACCU-CHEK SOFTCLIX LANCETS lancets 1 each by Other route 3 (three) times a day. Use as instructed 300 each 3 5/15/2018 at Unknown time   • albuterol (VENTOLIN HFA) 108 (90 BASE) MCG/ACT inhaler Ventolin  (90 Base) MCG/ACT Inhalation Aerosol Solution; Patient Sig: Ventolin  (90 Base) MCG/ACT Inhalation Aerosol Solution INHALE 1 TO 2 PUFFS EVERY 4 TO 6 HOURS AS NEEDED.; 1; 11; 18-Aug-2015; Active   5/14/2018 at 0900   • Alcohol Swabs (B-D SINGLE USE SWABS REGULAR) pads 1 swab 3 (three) times a day. 90 each 3 5/15/2018 at Unknown time   • aspirin 81 MG EC tablet Take  by mouth daily.   Past Week at Unknown time   • Blood Gluc Meter Disp-Strips device 1 strip 3 (three) times a day. Patient needs Accu-chek meter and test strips. 1 each 0 5/15/2018 at Unknown time   • Blood Glucose Calibration (SURECHEK CONTROL SOLUTION) NORMAL liquid 1 bottle by In Vitro route every 30 (thirty) days. 3 each 3 5/15/2018 at Unknown time   • carvedilol (COREG) 25 MG tablet Take 1 tablet by mouth 2 (Two) Times a Day With Meals. 180 tablet 1 5/15/2018 at 0900   • Cholecalciferol (VITAMIN D) 2000 UNITS tablet Take 1 capsule by mouth " 3 (Three) Times a Week.   Past Week at Unknown time   • Chromium-Cinnamon (CINNAMON PLUS CHROMIUM) 100-500 MCG-MG capsule Take 2 capsules by mouth 2 (two) times a day.   5/15/2018 at 0900   • colesevelam (WELCHOL) 625 MG tablet Take 1 tablet by mouth 2 (Two) Times a Day With Meals. 60 tablet 5 5/14/2018 at 0800   • Cyanocobalamin 2500 MCG sublingual tablet Place  under the tongue 3 (Three) Times a Week.   Past Week at Unknown time   • finasteride (PROSCAR) 5 MG tablet Take 1 tablet by mouth Daily. 90 tablet 2 5/15/2018 at 0900   • Fluticasone Furoate-Vilanterol (BREO ELLIPTA) 200-25 MCG/INH aerosol powder  Inhale 1 puff Daily. 90 each 11 5/14/2018 at 0900   • glimepiride (AMARYL) 4 MG tablet Take 1 tablet by mouth Daily. 90 tablet 3 5/15/2018 at 0900   • Glucosamine-Chondroit-Vit C-Mn (GLUCOSAMINE 1500 COMPLEX PO) Take 1 capsule by mouth 2 (Two) Times a Day.   5/15/2018 at 0900   • glucose blood (ACCU-CHEK IDANIA) test strip 1 each by Other route 3 (three) times a day. Use as instructed 90 each 3 5/15/2018 at Unknown time   • lisinopril-hydrochlorothiazide (PRINZIDE,ZESTORETIC) 10-12.5 MG per tablet Take 1 tablet by mouth Daily. 90 tablet 3 5/15/2018 at 0900   • metFORMIN (GLUCOPHAGE) 500 MG tablet Take 2 tablets by mouth 2 (Two) Times a Day With Meals. (Patient taking differently: Take 500 mg by mouth 2 (Two) Times a Day.) 180 tablet 11 5/15/2018 at 0900   • Mirabegron ER (MYRBETRIQ) 50 MG tablet sustained-release 24 hour Take  by mouth.   Past Month at Unknown time   • Omeprazole 20 MG tablet delayed-release Take 1 tablet by mouth Daily.   Past Month at Unknown time   • SITagliptin (JANUVIA) 100 MG tablet Take 1 tablet by mouth Daily. 28 tablet 0 5/15/2018 at 0900   • alfuzosin (UROXATRAL) 10 MG 24 hr tablet Take 10 mg by mouth Daily.   Unknown at Unknown time   • tamsulosin (FLOMAX) 0.4 MG capsule 24 hr capsule Take 1 capsule by mouth.   More than a month at Unknown time   , Scheduled Meds:    ampicillin-sulbactam  3 g Intravenous Once   , Continuous Infusions:    sodium chloride 100 mL/hr   , PRN Meds:  , Allergies:  Statins     Objective     Vital Sign Min/Max for last 24 hours  Temp  Min: 96.8 °F (36 °C)  Max: 97.8 °F (36.6 °C)   BP  Min: 106/68  Max: 126/57   Pulse  Min: 59  Max: 72   Resp  Min: 16  Max: 16   SpO2  Min: 98 %  Max: 100 %   No Data Recorded   Weight  Min: 98.9 kg (218 lb)  Max: 98.9 kg (218 lb 0.6 oz)            Physical Exam:     General Appearance:    Alert, cooperative, in no acute distress   Head:    Normocephalic, without obvious abnormality, atraumatic   Eyes:            Lids and lashes normal, conjunctivae and sclerae normal, no   icterus, no pallor, corneas clear, PERRLA   Ears:    Ears appear intact with no abnormalities noted   Throat:   No oral lesions, no thrush, oral mucosa moist   Neck:   No adenopathy, supple, trachea midline, no thyromegaly, no   carotid bruit, no JVD   Back:     No kyphosis present, no scoliosis present, no skin lesions,      erythema or scars, no tenderness to percussion or                   palpation,   range of motion normal   Lungs:     Clear to auscultation,respirations regular, even and                  unlabored    Heart:    Regular rhythm and normal rate, normal S1 and S2, no            murmur, no gallop, no rub, no click   Chest Wall:    No abnormalities observed   Abdomen:     Normal bowel sounds, no masses, no organomegaly, soft        non-tender, non-distended, no guarding, no rebound                tenderness   Rectal:     Deferred   Extremities:   Moves all extremities well, no edema, no cyanosis, no             redness   Pulses: Very weak common femoral pulse on the right side.  Good pedal pulses on the right side left side posterior tibial is extremely weak dorsalis pedis is one plus.  Good femoral pulses palpable.     Skin:   No bleeding, bruising or rash   Lymph nodes:   No palpable adenopathy   Neurologic:   Cranial nerves 2 - 12 grossly intact, sensation  intact, DTR       present and equal bilaterally       Results Review:   I reviewed the patient's new clinical results.    EKG: Sinus rhythm CO interval of 166 ms right bundle branch block left intravascular block no acute ischemic changes.    LAB DATA :           No results found for: WBC, RBC, HGB, HCT, MCV, MCH, MCHC, RDW, RDWSD, MPV, PLT, NEUTRORELPCT, LYMPHORELPCT, MONORELPCT, EOSRELPCT, BASORELPCT, AUTOIGPER, NEUTROABS, LYMPHSABS, MONOSABS, EOSABS, BASOSABS, AUTOIGNUM, NRBC    Lab Results   Component Value Date    GLUCOSE 206 (H) 08/16/2016    BUN 19 05/11/2018    CREATININE 1.10 05/11/2018    EGFRIFNONA 66 05/11/2018    EGFRIFAFRI 80 05/11/2018    BCR 17.3 05/11/2018    CO2 28.0 05/11/2018    CALCIUM 9.9 05/11/2018    PROTENTOTREF 6.5 05/11/2018    ALBUMIN 3.60 05/11/2018    LABIL2 1.2 05/11/2018    AST 47 (H) 05/11/2018    ALT 61 05/11/2018       No results found for: CKTOTAL, CKMB, CKMBINDEX, TROPONINI, TROPONINT    No results found for: DDIMER    No results found for: SITE, ALLENTEST, PHART, GRC6FZN, PO2ART, HGY7SYP, BASEEXCESS, K4WZXSDO, HGBBG, HCTABG, OXYHEMOGLOBI, METHHGBN, CARBOXYHGB, CO2CT, BAROMETRIC, MODALITY, FIO2  Lab Results   Component Value Date    HGBA1C 7.00 03/14/2018         No results found for: LIPASE    IMAGING DATA:     Us Gallbladder    Result Date: 5/15/2018  Narrative: Ultrasound of the gallbladder was performed.  A 1.9 cm gallstone was identified in the neck of the gallbladder.  The gallbladder wall is slightly thickened at 6 mm suggesting possible cholecystitis.  No fabi-cholecystic fluid.  Common bile duct is normal at 5 mm.    Chest X-ray 1 View    Result Date: 5/15/2018  Narrative: PROCEDURE: XR CHEST 1 VW-  HISTORY: Pre-Op Clearance     COMPARISON:  2/8/2013  FINDINGS:  Portable view of the chest demonstrates the lungs to be grossly clear. There is no evidence of effusion, pneumothorax or other significant pleural disease. The mediastinum is unremarkable.  The heart size is  normal.          Impression: Unremarkable portable chest.  This report was finalized on 5/15/2018 1:20 PM by Armen Solis MD.        DIAGNOSIS  #1 chest pains: Patient's chest pains appear to be atypical by description.  Though most of the times he gets these pains during exertion he claims he can walk through them.  However his risk profile for coronary artery disease is significant.  His EKG is abnormal though not ischemic.  He has been offered further workup with respect to CAD on outpatient basis.  Given his functional status and his present medications which include beta blocker therapy and his controlled vital signs patient is cleared for surgery as a low-risk candidate for perioperative cardiac events.    #2 peripheral vascular disease: On exam patient has very feeble common femoral artery pulse on the right side and very weak posterior tibial pulse on the left.  He has classic hip claudication which is been function limiting.  Has been offered further workup on follow-up.    #3 hypertension: Seems to be well controlled on the present medications he is on appropriate medical therapy would continue the same.    #4 dyslipidemia: Needs to be on statin therapy the same will be pursued on an outpatient basis but    #5 diabetes mellitus: Seems to be well controlled with A1c of 7.    Patient has a been asked to follow up with me on June 11 at 3:30 PM.    Principal Problem:    Calculus of gallbladder with acute cholecystitis without obstruction          I discussed the patients findings and my recommendations with patient and family    Parish Hernandez MD  05/15/18  1:32 PM      Please note that portions of this note may have been completed with a voice recognition program. Efforts were made to edit the dictations, but occasionally words are mistranscribed.

## 2018-05-15 NOTE — PROGRESS NOTES
Patient: Fazal Berg    YOB: 1945    Date: 05/15/2018    Primary Care Provider: Matthew Carr MD    Chief Complaint   Patient presents with   • Abdominal Pain       Subjective .     History of present illness:  I saw the patient in the office today for an evaluation and treatment of abdominal pain. He complains of a constant ache in his RUQ, radiates into his epigastric region and LUQ, worsens after meals. He states the pain is intermittent for the past 3 weeks. He complains of associated fever At night for the past several days, weight loss, fatigue and diarrhea. He denies vomiting and constipation. He states he has had symptoms for the past 3 weeks.     The following portions of the patient's history were reviewed and updated as appropriate: allergies, current medications, past family history, past medical history, past social history, past surgical history and problem list.      Review of Systems   Constitutional: Positive for fatigue, fever and unexpected weight change. Negative for chills.   HENT: Negative for trouble swallowing and voice change.    Eyes: Negative for visual disturbance.   Respiratory: Negative for apnea, cough, chest tightness, shortness of breath and wheezing.    Cardiovascular: Negative for chest pain, palpitations and leg swelling.   Gastrointestinal: Positive for abdominal pain and diarrhea. Negative for abdominal distention, anal bleeding, blood in stool, constipation, nausea, rectal pain and vomiting.   Endocrine: Negative for cold intolerance and heat intolerance.   Genitourinary: Negative for difficulty urinating, dysuria, flank pain, scrotal swelling and testicular pain.   Musculoskeletal: Negative for back pain, gait problem and joint swelling.   Skin: Negative for color change, rash and wound.   Neurological: Negative for dizziness, syncope, speech difficulty, weakness, numbness and headaches.   Hematological: Negative for adenopathy. Does not bruise/bleed  easily.   Psychiatric/Behavioral: Negative for confusion. The patient is not nervous/anxious.        Allergies:  Allergies   Allergen Reactions   • Statins Myalgia       Medications:    Current Outpatient Prescriptions:   •  ACCU-CHEK SOFTCLIX LANCETS lancets, 1 each by Other route 3 (three) times a day. Use as instructed, Disp: 300 each, Rfl: 3  •  albuterol (VENTOLIN HFA) 108 (90 BASE) MCG/ACT inhaler, Ventolin  (90 Base) MCG/ACT Inhalation Aerosol Solution; Patient Sig: Ventolin  (90 Base) MCG/ACT Inhalation Aerosol Solution INHALE 1 TO 2 PUFFS EVERY 4 TO 6 HOURS AS NEEDED.; 1; 11; 18-Aug-2015; Active, Disp: , Rfl:   •  Alcohol Swabs (B-D SINGLE USE SWABS REGULAR) pads, 1 swab 3 (three) times a day., Disp: 90 each, Rfl: 3  •  alfuzosin (UROXATRAL) 10 MG 24 hr tablet, Take  by mouth., Disp: , Rfl:   •  aspirin 81 MG EC tablet, Take  by mouth daily., Disp: , Rfl:   •  Blood Gluc Meter Disp-Strips device, 1 strip 3 (three) times a day. Patient needs Accu-chek meter and test strips., Disp: 1 each, Rfl: 0  •  Blood Glucose Calibration (SURECHEK CONTROL SOLUTION) NORMAL liquid, 1 bottle by In Vitro route every 30 (thirty) days., Disp: 3 each, Rfl: 3  •  carvedilol (COREG) 25 MG tablet, Take 1 tablet by mouth 2 (Two) Times a Day With Meals., Disp: 180 tablet, Rfl: 1  •  Cholecalciferol (VITAMIN D) 2000 UNITS tablet, Take 1 capsule by mouth 3 (Three) Times a Week., Disp: , Rfl:   •  Chromium-Cinnamon (CINNAMON PLUS CHROMIUM) 100-500 MCG-MG capsule, Take 2 capsules by mouth 2 (two) times a day., Disp: , Rfl:   •  colesevelam (WELCHOL) 625 MG tablet, Take 1 tablet by mouth 2 (Two) Times a Day With Meals., Disp: 60 tablet, Rfl: 5  •  Cyanocobalamin 2500 MCG sublingual tablet, Place  under the tongue 3 (Three) Times a Week., Disp: , Rfl:   •  finasteride (PROSCAR) 5 MG tablet, Take 1 tablet by mouth Daily., Disp: 90 tablet, Rfl: 2  •  Fluticasone Furoate-Vilanterol (BREO ELLIPTA) 200-25 MCG/INH aerosol powder ,  "Inhale 1 puff Daily., Disp: 90 each, Rfl: 11  •  glimepiride (AMARYL) 4 MG tablet, Take 1 tablet by mouth Daily., Disp: 90 tablet, Rfl: 3  •  Glucosamine-Chondroit-Vit C-Mn (GLUCOSAMINE 1500 COMPLEX PO), Take  by mouth 2 (Two) Times a Day., Disp: , Rfl:   •  glucose blood (ACCU-CHEK IDANIA) test strip, 1 each by Other route 3 (three) times a day. Use as instructed, Disp: 90 each, Rfl: 3  •  lisinopril-hydrochlorothiazide (PRINZIDE,ZESTORETIC) 10-12.5 MG per tablet, Take 1 tablet by mouth Daily., Disp: 90 tablet, Rfl: 3  •  metFORMIN (GLUCOPHAGE) 500 MG tablet, Take 2 tablets by mouth 2 (Two) Times a Day With Meals. (Patient taking differently: Take 500 mg by mouth 2 (Two) Times a Day.), Disp: 180 tablet, Rfl: 11  •  Mirabegron ER (MYRBETRIQ) 50 MG tablet sustained-release 24 hour, Take  by mouth., Disp: , Rfl:   •  Omeprazole 20 MG tablet delayed-release, Take  by mouth., Disp: , Rfl:   •  SITagliptin (JANUVIA) 100 MG tablet, Take 1 tablet by mouth Daily., Disp: 28 tablet, Rfl: 0  •  tamsulosin (FLOMAX) 0.4 MG capsule 24 hr capsule, Take 1 capsule by mouth., Disp: , Rfl:     History\"  Past Medical History:   Diagnosis Date   • Abnormal menses    • Acute asthma exacerbation    • Acute sinusitis    • Acute URI    • Allergic rhinitis    • Anemia    • Asthma    • Benign prostatic hypertrophy    • Deafness    • ED (erectile dysfunction)    • Family history of deafness and hearing loss    • GERD (gastroesophageal reflux disease)    • Hyperlipidemia    • Hypertension    • Obstructive sleep apnea    • Peripheral neuropathy    • Short of breath on exertion    • Skin cancer    • Type 2 diabetes mellitus    • Urinary frequency    • Vitamin B 12 deficiency    • Vitamin D deficiency        Past Surgical History:   Procedure Laterality Date   • APPENDECTOMY     • HAND SURGERY     • KNEE ARTHROSCOPY Left        Family History   Problem Relation Age of Onset   • Diabetes Mother    • Diabetes Father    • Arthritis Other    • Gout " "Other    • Heart disease Other    • Lung disease Other        Social History   Substance Use Topics   • Smoking status: Never Smoker   • Smokeless tobacco: Never Used   • Alcohol use No        Objective     Vital Signs:   Vitals:    05/15/18 0857   BP: 112/72   Pulse: 72   Temp: 97.3 °F (36.3 °C)   SpO2: 98%   Weight: 98.9 kg (218 lb 0.6 oz)   Height: 180.3 cm (70.98\")       Physical Exam:   General Appearance:    Alert, cooperative, in no acute distress   Head:    Normocephalic, without obvious abnormality, atraumatic   Eyes:            Lids and lashes normal, conjunctivae and sclerae normal, no   icterus, no pallor, corneas clear, PERRLA   Ears:    Ears appear intact with no abnormalities noted   Lungs:     Clear to auscultation,respirations regular, even and                  unlabored    Heart:    Regular rhythm and normal rate, normal S1 and S2, no            murmur, no gallop, no rub, no click   Chest Wall:    No abnormalities observed   Abdomen:     Normal bowel sounds, no masses, no organomegaly, soft         non-distended, no guarding,Mild right upper quadrant tenderness   Extremities:   Moves all extremities well, no edema, no cyanosis, no             redness   Skin:   No bleeding, bruising or rash   Neurologic:   Cranial nerves 2 - 12 grossly intact, sensation intact,   Psychiatric: No evidence of depression or anxiety      Results Review:   I reviewed the patient's new clinical results.  Labs were reviewed    Assessment/Plan     1. Right upper quadrant abdominal pain    2. Calculus of gallbladder with acute cholecystitis without obstruction      Patient with at least 1 large gallstone in the neck of the gallbladder and findings suggestive of acute cholecystitis with thickened gallbladder wall.  This does correlate with his symptoms I do think he is having gallbladder issues.  I recommend a cholecystectomy to the patient.  He understands this procedure as I have explained to him in detail and have drawn " him a picture as well.  He understands the associated risks of bleeding, infection, bile leak, ductal injury, possibility of converting to an open procedure, intestinal injury etc. Patient again understands all of the above and wishes to proceed with a laparoscopic cholecystectomy and does not wish to pursue conservative medical management.    Electronically signed by Eric Bran MD  05/15/18      Scribed for Eric Bran MD by Gissel Mccloud. 5/15/2018  9:20 AM

## 2018-05-15 NOTE — DISCHARGE INSTRUCTIONS
Pt instructed on PAT PASS information.  Pt verbalizes understanding.  DO NOT EAT, DRINK, SMOKE, USE SMOKELESS TOBACCO OR CHEW GUM AFTER MIDNIGHT THE NIGHT BEFORE SURGERY.  THIS ALSO INCLUDES HARD CANDIES AND MINTS.    DO NOT SHAVE THE AREA TO BE OPERATED ON AT LEAST 48 HOURS PRIOR TO THE PROCEDURE.  DO NOT WEAR MAKE UP OR NAIL POLISH.  DO NOT LEAVE IN ANY PIERCING OR WEAR JEWELRY THE DAY OF SURGERY.      DO NOT USE ADHESIVES IF YOU WEAR DENTURES.    DO NOT WEAR EYE CONTACTS; BRING IN YOUR GLASSES.    ONLY TAKE MEDICATION THE MORNING OF YOUR PROCEDURE IF INSTRUCTED BY YOUR SURGEON WITH ENOUGH WATER TO SWALLOW THE MEDICATION.  IF YOUR SURGEON DID NOT SPECIFY WHICH MEDICATIONS TO TAKE, YOU WILL NEED TO CALL THEIR OFFICE FOR FURTHER INSTRUCTIONS AND DO AS THEY INSTRUCT.    LEAVE ANYTHING YOU CONSIDER VALUABLE AT HOME.    YOU WILL NEED TO ARRANGE FOR SOMEONE TO DRIVE YOU HOME AFTER SURGERY.  IT IS RECOMMENDED THAT YOU DO NOT DRIVE, WORK, DRINK ALCOHOL OR MAKE MAJOR DECISIONS FOR AT LEAST 24 HOURS AFTER YOUR PROCEDURE IS COMPLETE.      THE DAY OF YOUR PROCEDURE, BRING IN THE FOLLOWING IF APPLICABLE:   PICTURE ID AND INSURANCE/MEDICARE OR MEDICAID CARDS   COPY OF ADVANCED DIRECTIVE/LIVING WILL/POWER OR    CPAP/BIPAP/INHALERS   SKIN PREP SHEET   YOUR PREADMISSION TESTING PASS (IF NOT A PHONE HISTORY

## 2018-05-15 NOTE — OP NOTE
PATIENT:     Fazal Berg III    DATE OF SURGERY:     5/15/2018    PHYSICIAN:   Eric Bran MD    REFERRING PHYSICIAN:  Matthew Carr MD     YOB: 1945    PREOPERATIVE DIAGNOSIS:  Acute on Chronic cholecystitis due to Cholelithiasis    POSTOPERATIVE DIAGNOSIS:  Acute on chronic cholecystitis due to Cholelithiasis    PROCEDURE:  Laparoscopic cholecystectomy with intraoperative cholangiography.    ANESTHESIA:  General endotracheal.    OPERATIVE PROCEDURE:  The patient was taken to the operating room, placed in the supine position, and given general endotracheal anesthesia.  The patient was prepped and draped in the normal sterile fashion, and also received preoperative IV antibiotics.  An appropriate timeout was performed by the nursing staff prior to the incision.     Local anesthetic with Marcaine 0.5% was infused locally at all incision sites. An infra- umbilical incision was then made to insert a 5mm opti-view into the peritoneal cavity and the peritoneal cavity was insufflated with carbon dioxide. A separate 5 mm port was inserted in the subxiphoid region  along with a right subcostal 5 mm port.  The gallbladder was well visualized.    Good exposure was obtained, and the gallbladder was grasped at its infundibulum and its fundus and retracted superiorly and laterally.  There were some acute and chronic attachments of fatty tissue to the gallbladder indicative of chronic cholecystitis and these were easily taken down.  Findings were consistent with acute on chronic cholecystitis.    Good exposure was obtained and dissection was performed at the junction of the cystic duct and the gallbladder. The cystic duct and cystic artery were identified in the normal manner.  A clip was then placed on the cystic duct proximally and then two clips were placed on the cystic artery proximally and one distally.  Cystic ductotomy was performed.  A separate cholangiogram catheter had been inserted  through a right upper quadrant introducer port and then this was fed into the cystic duct itself and a clip was applied.     Intraoperative cholangiography was then performed under fluoroscopy, carefully evaluating the biliary ductal anatomy.  This was done without difficulty.  No anatomic abnormality was noted.      The cholangiogram catheter was removed.  The cystic duct was clipped twice distally and then divided, as was the cystic artery.  Bovie electrocautery with L-hook was then used to remove the gallbladder from the liver bed.  It was then removed through the umbilical port site using an Endo Catch bag.. Fascia was closed with a 2-O Vicryl figure of eight suture.    Copious irrigation was used in the patient’s abdominal cavity.  It was clean and dry at this point.  Hemostasis had been well controlled.  Hemostasis was intact and there was no evidence of bilious leakage.  A Karsten-Schmitz drain was placed through the right upper quadrant port site into gallbladder fossa.  Trocars were removed under direct vision and the skin was closed with 4-0 PDS subcuticular stitch along with Steri-Strips for skin reapproximation.  There were no complications.    The patient was stable at this point and subsequently transferred back to the recovery room in stable condition.    Eric Bran MD    5/15/2018    5:30 PM

## 2018-05-15 NOTE — DISCHARGE INSTRUCTIONS
Rest today  No pushing,pulling,tugging,heavy lifting, or strenuous activity   No major decision making,driving,or drinking alcoholic beverages for 24 hours due to the sedation you received  Always use good hand hygiene/washing technique  No driving on pain medication.    To assist you in voiding:  Drink plenty of fluids  Listen to running water while attempting to void.    If you are unable to urinate and you have an uncomfortable urge to void or it has been   6 hours since you were discharged, return to the Emergency Room.    Turn, cough, and deep breath every 2 hours to prevent post-op complications of infection or pneumonia.     May splint abdomen when moving, coughing, or increasing activity as comfort measure.    Use ice pack as directed.  Apply ice for 20-30 minutes over incision sites, remove, and reapply in 2-3 hours.  Do not use ice continuously.    Empty MARTHA drain every 4 hours and record amounts on MARTHA flow sheet.  Return sheet to office with you on day of appointment.

## 2018-05-15 NOTE — PAT
"CALLED DONYA BISWAS CRNA R/T PT C/O CHEST PAIN ON OCCASION.  PT UNABLE TO PINPOINT LAST TIME HE HAD CHEST PAIN.  STATES THAT HE HAD THIS SAME KIND OF PAIN AROUND 2003 AND DR. CARRASCO DID AN ECHO AND A STRESS TEST, IN WHICH HE STATES WERE WNL.  PT STATES \"I'M NOT A COMPLAINER\".  STATES COLD WEATHER AND WALKING BRING IT ON.  GETS SHORT OF BREATH OCCASIONALLY.  PT DESCRIBES PAIN AS MORE LIKE PRESSURE.  DONYA STATED ANESTHESIA WOULD TALK TO HIM WHEN HE CAME OVER TO PRE-OP TODAY, FOR FURTHER EVAL.    "

## 2018-05-15 NOTE — ANESTHESIA PREPROCEDURE EVALUATION
Anesthesia Evaluation     Patient summary reviewed and Nursing notes reviewed   no history of anesthetic complications:  NPO Solid Status: > 8 hours  NPO Liquid Status: > 2 hours           Airway   Mallampati: II  TM distance: >3 FB  Neck ROM: full  No difficulty expected  Dental - normal exam     Pulmonary    (+) pneumonia resolved , asthma, shortness of breath, recent URI resolved, sleep apnea,   (-) not a smoker    ROS comment: Bronchitis  Cardiovascular - normal exam  Exercise tolerance: good (4-7 METS)    ECG reviewed  PT is on anticoagulation therapy  Patient on routine beta blocker    (+) hypertension well controlled 2 medications or greater, CAD, [place], hyperlipidemia,     ROS comment: History of negative stress test and heart cath 13 years ago.     EKG reviewed and cleared by Dr. Hernandez    Neuro/Psych  (+) numbness (peripheral neuraopathy),     GI/Hepatic/Renal/Endo    (+)  hiatal hernia, GERD well controlled,  diabetes mellitus (FSBS 65 treated with 1/2 amp of D50) type 2 well controlled,     Musculoskeletal     Abdominal    Substance History      OB/GYN          Other   (+) arthritis   history of cancer (prostate and skin) remission    ROS/Med Hx Other: Anemia  BPH                Anesthesia Plan    ASA 3     general   (Risks and benefits discussed including risk of aspiration, recall and dental damage. All patient questions answered. Will continue with POC.    Had clear liquids due to low blood sugar this AM. )  intravenous induction   Anesthetic plan and risks discussed with patient.

## 2018-05-15 NOTE — ANESTHESIA POSTPROCEDURE EVALUATION
Patient: Fazal Berg III    Procedure Summary     Date:  05/15/18 Room / Location:  [facility] OR 4 /  AB OR    Anesthesia Start:  1613 Anesthesia Stop:      Procedure:  CHOLECYSTECTOMY LAPAROSCOPIC INTRAOPERATIVE CHOLANGIOGRAM (N/A Abdomen) Diagnosis:       Calculus of gallbladder with acute cholecystitis without obstruction      (Calculus of gallbladder with acute cholecystitis without obstruction [K80.00])    Surgeon:  Eric Bran MD Provider:  Fred Sinclair CRNA    Anesthesia Type:  general ASA Status:  3          Anesthesia Type: general  Last vitals  BP   133/63   Temp   98.8   Pulse   70   Resp   17    SpO2   100     Post Anesthesia Care and Evaluation    Patient location during evaluation: PACU  Patient participation: complete - patient participated  Level of consciousness: awake and alert  Pain score: 0  Pain management: satisfactory to patient  Airway patency: patent  Anesthetic complications: No anesthetic complications  PONV Status: none  Cardiovascular status: acceptable and stable  Respiratory status: acceptable and face mask  Hydration status: acceptable

## 2018-05-16 LAB
ADV 40+41 DNA STL QL NAA+NON-PROBE: NOT DETECTED
ASTRO TYP 1-8 RNA STL QL NAA+NON-PROBE: NOT DETECTED
C CAYETANENSIS DNA STL QL NAA+NON-PROBE: NOT DETECTED
C COLI+JEJ+UPSA DNA STL QL NAA+NON-PROBE: NOT DETECTED
C DIF TOX TCDA+TCDB STL QL NAA+NON-PROBE: NOT DETECTED
CRYPTOSP DNA STL QL NAA+NON-PROBE: NOT DETECTED
E COLI O157 DNA STL QL NAA+NON-PROBE: NORMAL
E HISTOLYT DNA STL QL NAA+NON-PROBE: NOT DETECTED
EAEC PAA PLAS AGGR+AATA ST NAA+NON-PRB: NOT DETECTED
EC STX1+STX2 GENES STL QL NAA+NON-PROBE: NOT DETECTED
EPEC EAE GENE STL QL NAA+NON-PROBE: NOT DETECTED
ETEC LTA+ST1A+ST1B TOX ST NAA+NON-PROBE: NOT DETECTED
G LAMBLIA DNA STL QL NAA+NON-PROBE: NOT DETECTED
NOROVIRUS GI+II RNA STL QL NAA+NON-PROBE: NOT DETECTED
P SHIGELLOIDES DNA STL QL NAA+NON-PROBE: NOT DETECTED
RVA RNA STL QL NAA+NON-PROBE: NOT DETECTED
S ENT+BONG DNA STL QL NAA+NON-PROBE: NOT DETECTED
SAPO I+II+IV+V RNA STL QL NAA+NON-PROBE: NOT DETECTED
SHIGELLA SP+EIEC IPAH ST NAA+NON-PROBE: NOT DETECTED
V CHOL+PARA+VUL DNA STL QL NAA+NON-PROBE: NOT DETECTED
V CHOLERAE DNA STL QL NAA+NON-PROBE: NOT DETECTED
Y ENTEROCOL DNA STL QL NAA+NON-PROBE: NOT DETECTED

## 2018-05-16 NOTE — NURSING NOTE
1849  MARTHA site is leaking serous sanguineous fluid onto gauze under silk tape of original dressing of gauze/silk tape.  Site redressed per FLAVIO RamiresU RN with 4x4 gauze/tape.  Will monitor for additional drainage.    1920  MARTHA site has leaked serous sanguineous drainage onto reinforced dressing of 4x4 gauze/silk tape.  4x4's are wet.  Dr. Bran called with MARTHA drainage around tube and dressings being saturated.  Dr reports that he expected drainage due to irrigation.  Dr ask that RN reinforce dressing as needed and to send supplies home with family to reinforce dressing as needed.    1930  MARTHA dressing reinforced w/folded 4x4 gauze for second time and abd combine dressing w/silk tape.  Wife shown by RN how to do dressing reinforcement and wife acknowledged v/u info.  Supplies of 4x4 gauze, abd combine dressings, and silk tape sent home with pt..  Blue chux given to place over dressing to protect clothing/bed linen tonight in case MARTHA drainage continues.

## 2018-05-18 LAB
LAB AP CASE REPORT: NORMAL
Lab: NORMAL
PATH REPORT.FINAL DX SPEC: NORMAL

## 2018-05-22 ENCOUNTER — OFFICE VISIT (OUTPATIENT)
Dept: SURGERY | Facility: CLINIC | Age: 73
End: 2018-05-22

## 2018-05-22 VITALS
WEIGHT: 218.03 LBS | HEIGHT: 72 IN | BODY MASS INDEX: 29.53 KG/M2 | OXYGEN SATURATION: 97 % | TEMPERATURE: 97.8 F | HEART RATE: 69 BPM

## 2018-05-22 DIAGNOSIS — Z48.89 POSTOPERATIVE VISIT: Primary | ICD-10-CM

## 2018-05-22 PROCEDURE — 99024 POSTOP FOLLOW-UP VISIT: CPT | Performed by: SURGERY

## 2018-05-22 RX ORDER — CHOLESTYRAMINE 4 G/9G
1 POWDER, FOR SUSPENSION ORAL 2 TIMES DAILY WITH MEALS
Qty: 60 PACKET | Refills: 2 | Status: SHIPPED | OUTPATIENT
Start: 2018-05-22 | End: 2018-06-18

## 2018-05-22 NOTE — PROGRESS NOTES
"Patient: Fazal Berg III    YOB: 1945    Date: 05/22/2018    Primary Care Provider: Matthew Carr MD    Reason for Consultation: Follow-up lap jaret    Chief Complaint   Patient presents with   • Post-op     post op lap jaret       History of present illness:  I saw the patient in the office today as a followup from their recent laparoscopic cholecystectomy.  The pathology showed chronic cholecystitis with cholelithiasis.  They state that they have done well and are having no complaints.He is complaining of some postprandial diarrhea which she had before surgery as well.  Had 4 episodes yesterday    The following portions of the patient's history were reviewed and updated as appropriate: allergies, current medications, past family history, past medical history, past social history, past surgical history and problem list.      Vital Signs:   Vitals:    05/22/18 1119   Pulse: 69   Temp: 97.8 °F (36.6 °C)   TempSrc: Temporal Artery    SpO2: 97%   Weight: 98.9 kg (218 lb 0.6 oz)   Height: 182.9 cm (72.01\")       Physical Exam:   General Appearance:    Alert, cooperative, in no acute distress   Abdomen:     no masses, no organomegaly, soft non-tender, non-distended, no guarding, wounds are well healed.  Drain was removed since it only had minimal serosanguineous drainage      Assessment / Plan :    1. Postoperative visit        Overall doing well.  Activity instructions were given.  Continue low-fat diet.  I'll give him some cholestyramine for the posterior no diarrhea.  Also we'll see him in a month and at that time we'll discuss colon screening.  Incidentally, he is no longer taking the WelChol due to side effects.  He stated that it made his legs \"stiff\". He understands that he may have some side effects with the cholestyramine also.    Electronically signed by Eric Bran MD  05/22/18    Scribed for Eric Bran MD by Michelle Milligan. 5/22/2018  11:40 AM                "

## 2018-06-12 ENCOUNTER — TRANSCRIBE ORDERS (OUTPATIENT)
Dept: ADMINISTRATIVE | Facility: HOSPITAL | Age: 73
End: 2018-06-12

## 2018-06-12 DIAGNOSIS — I25.10 CAD IN NATIVE ARTERY: Primary | ICD-10-CM

## 2018-06-14 ENCOUNTER — HOSPITAL ENCOUNTER (OUTPATIENT)
Dept: NUCLEAR MEDICINE | Facility: HOSPITAL | Age: 73
Discharge: HOME | End: 2018-06-14
Attending: INTERNAL MEDICINE

## 2018-06-14 DIAGNOSIS — I25.10 CAD IN NATIVE ARTERY: ICD-10-CM

## 2018-06-14 LAB
BH CV STRESS COMMENTS STAGE 1: NORMAL
BH CV STRESS DOSE REGADENOSON STAGE 1: 0.4
BH CV STRESS DURATION MIN STAGE 1: 0
BH CV STRESS DURATION SEC STAGE 1: 10
BH CV STRESS PROTOCOL 1: NORMAL
BH CV STRESS RECOVERY BP: NORMAL MMHG
BH CV STRESS RECOVERY HR: 70 BPM
BH CV STRESS STAGE 1: 1
LV EF NUC BP: 65 %
MAXIMAL PREDICTED HEART RATE: 149 BPM
PERCENT MAX PREDICTED HR: 57.72 %
STRESS BASELINE BP: NORMAL MMHG
STRESS BASELINE HR: 52 BPM
STRESS PERCENT HR: 68 %
STRESS POST PEAK BP: NORMAL MMHG
STRESS POST PEAK HR: 86 BPM
STRESS TARGET HR: 127 BPM

## 2018-06-14 PROCEDURE — 25010000002 REGADENOSON 0.4 MG/5ML SOLUTION: Performed by: INTERNAL MEDICINE

## 2018-06-14 PROCEDURE — A9500 TC99M SESTAMIBI: HCPCS | Performed by: INTERNAL MEDICINE

## 2018-06-14 PROCEDURE — 0 TECHNETIUM SESTAMIBI: Performed by: INTERNAL MEDICINE

## 2018-06-14 PROCEDURE — 93017 CV STRESS TEST TRACING ONLY: CPT

## 2018-06-14 PROCEDURE — 78452 HT MUSCLE IMAGE SPECT MULT: CPT

## 2018-06-14 RX ADMIN — TECHNETIUM TC 99M SESTAMIBI 1 DOSE: 1 INJECTION INTRAVENOUS at 06:20

## 2018-06-14 RX ADMIN — REGADENOSON 0.4 MG: 0.08 INJECTION, SOLUTION INTRAVENOUS at 08:00

## 2018-06-14 RX ADMIN — TECHNETIUM TC 99M SESTAMIBI 1 DOSE: 1 INJECTION INTRAVENOUS at 08:00

## 2018-06-15 ENCOUNTER — APPOINTMENT (OUTPATIENT)
Dept: NUCLEAR MEDICINE | Facility: HOSPITAL | Age: 73
End: 2018-06-15
Attending: INTERNAL MEDICINE

## 2018-06-18 ENCOUNTER — OFFICE VISIT (OUTPATIENT)
Dept: INTERNAL MEDICINE | Facility: CLINIC | Age: 73
End: 2018-06-18

## 2018-06-18 VITALS
HEIGHT: 73 IN | DIASTOLIC BLOOD PRESSURE: 67 MMHG | HEART RATE: 62 BPM | RESPIRATION RATE: 16 BRPM | OXYGEN SATURATION: 100 % | WEIGHT: 220 LBS | TEMPERATURE: 97.4 F | BODY MASS INDEX: 29.16 KG/M2 | SYSTOLIC BLOOD PRESSURE: 148 MMHG

## 2018-06-18 DIAGNOSIS — E78.5 HYPERLIPIDEMIA, UNSPECIFIED HYPERLIPIDEMIA TYPE: ICD-10-CM

## 2018-06-18 DIAGNOSIS — I10 ESSENTIAL HYPERTENSION: Primary | ICD-10-CM

## 2018-06-18 DIAGNOSIS — Z00.00 ROUTINE GENERAL MEDICAL EXAMINATION AT A HEALTH CARE FACILITY: ICD-10-CM

## 2018-06-18 DIAGNOSIS — E11.42 TYPE 2 DIABETES, CONTROLLED, WITH PERIPHERAL NEUROPATHY (HCC): ICD-10-CM

## 2018-06-18 PROCEDURE — G0438 PPPS, INITIAL VISIT: HCPCS | Performed by: INTERNAL MEDICINE

## 2018-06-18 PROCEDURE — 99397 PER PM REEVAL EST PAT 65+ YR: CPT | Performed by: INTERNAL MEDICINE

## 2018-06-18 PROCEDURE — 90472 IMMUNIZATION ADMIN EACH ADD: CPT | Performed by: INTERNAL MEDICINE

## 2018-06-18 PROCEDURE — 90632 HEPA VACCINE ADULT IM: CPT | Performed by: INTERNAL MEDICINE

## 2018-06-18 PROCEDURE — 90746 HEPB VACCINE 3 DOSE ADULT IM: CPT | Performed by: INTERNAL MEDICINE

## 2018-06-18 PROCEDURE — 96160 PT-FOCUSED HLTH RISK ASSMT: CPT | Performed by: INTERNAL MEDICINE

## 2018-06-18 PROCEDURE — G0010 ADMIN HEPATITIS B VACCINE: HCPCS | Performed by: INTERNAL MEDICINE

## 2018-06-18 NOTE — PROGRESS NOTES
QUICK REFERENCE INFORMATION:  The ABCs of the Annual Wellness Visit    Initial Medicare Wellness Visit    HEALTH RISK ASSESSMENT    1945    Recent Hospitalizations:  No hospitalization(s) within the last year..    Pain scale: no pain reported.      Current Medical Providers:  Patient Care Team:  Matthew Carr MD as PCP - General  Eric Bran MD as Consulting Physician (General Surgery)        Smoking Status:  History   Smoking Status   • Never Smoker   Smokeless Tobacco   • Never Used       Alcohol Consumption:  History   Alcohol Use No       Depression Screen:   PHQ-2/PHQ-9 Depression Screening 6/18/2018   Little interest or pleasure in doing things 0   Feeling down, depressed, or hopeless 0   Total Score 0       Health Habits and Functional and Cognitive Screening:  Functional & Cognitive Status 6/18/2018   Do you have difficulty preparing food and eating? No   Do you have difficulty bathing yourself, getting dressed or grooming yourself? No   Do you have difficulty using the toilet? No   Do you have difficulty moving around from place to place? No   Do you have trouble with steps or getting out of a bed or a chair? No   In the past year have you fallen or experienced a near fall? No   Current Diet Well Balanced Diet   Dental Exam Up to date   Eye Exam Up to date   Exercise (times per week) 4 times per week   Current Exercise Activities Include Walking   Do you need help using the phone?  No   Are you deaf or do you have serious difficulty hearing?  No   Do you need help with transportation? No   Do you need help shopping? No   Do you need help preparing meals?  No   Do you need help with housework?  No   Do you need help with laundry? No   Do you need help taking your medications? No   Do you need help managing money? No   Do you ever drive or ride in a car without wearing a seat belt? No   Have you felt unusual stress, anger or loneliness in the last month? No   Who do you live with? Spouse   If  you need help, do you have trouble finding someone available to you? No   Have you been bothered in the last four weeks by sexual problems? No   Do you have difficulty concentrating, remembering or making decisions? No           Does the patient have evidence of cognitive impairment? No    Asiprin use counseling: Taking ASA appropriately as indicated      Recent Lab Results:    Visual Acuity:  No exam data present    Age-appropriate Screening Schedule:  Refer to the list below for future screening recommendations based on patient's age, sex and/or medical conditions. Orders for these recommended tests are listed in the plan section. The patient has been provided with a written plan.    Health Maintenance   Topic Date Due   • ZOSTER VACCINE (3 of 3) 06/18/2018   • INFLUENZA VACCINE  08/01/2018   • HEMOGLOBIN A1C  09/14/2018   • DIABETIC FOOT EXAM  11/09/2018   • DIABETIC EYE EXAM  11/09/2018   • LIPID PANEL  03/14/2019   • URINE MICROALBUMIN  03/14/2019   • COLONOSCOPY  11/07/2026   • TDAP/TD VACCINES (2 - Td) 02/09/2027   • PNEUMOCOCCAL VACCINES (65+ LOW/MEDIUM RISK)  Completed        Subjective   History of Present Illness    Fazal Berg III is a 73 y.o. male who presents for an Annual Wellness Visit.    The following portions of the patient's history were reviewed and updated as appropriate: allergies, current medications, past family history, past medical history, past social history, past surgical history and problem list.    Outpatient Medications Prior to Visit   Medication Sig Dispense Refill   • ACCU-CHEK SOFTCLIX LANCETS lancets 1 each by Other route 3 (three) times a day. Use as instructed 300 each 3   • albuterol (VENTOLIN HFA) 108 (90 BASE) MCG/ACT inhaler Ventolin  (90 Base) MCG/ACT Inhalation Aerosol Solution; Patient Sig: Ventolin  (90 Base) MCG/ACT Inhalation Aerosol Solution INHALE 1 TO 2 PUFFS EVERY 4 TO 6 HOURS AS NEEDED.; 1; 11; 18-Aug-2015; Active     • Alcohol Swabs (B-D  SINGLE USE SWABS REGULAR) pads 1 swab 3 (three) times a day. 90 each 3   • aspirin 81 MG EC tablet Take  by mouth daily.     • Blood Gluc Meter Disp-Strips device 1 strip 3 (three) times a day. Patient needs Accu-chek meter and test strips. 1 each 0   • Blood Glucose Calibration (SURECHEK CONTROL SOLUTION) NORMAL liquid 1 bottle by In Vitro route every 30 (thirty) days. 3 each 3   • carvedilol (COREG) 25 MG tablet Take 1 tablet by mouth 2 (Two) Times a Day With Meals. 180 tablet 1   • Cholecalciferol (VITAMIN D) 2000 UNITS tablet Take 1 capsule by mouth 3 (Three) Times a Week.     • Chromium-Cinnamon (CINNAMON PLUS CHROMIUM) 100-500 MCG-MG capsule Take 2 capsules by mouth 2 (two) times a day.     • finasteride (PROSCAR) 5 MG tablet Take 1 tablet by mouth Daily. 90 tablet 2   • glimepiride (AMARYL) 4 MG tablet Take 1 tablet by mouth Daily. 90 tablet 3   • glucose blood (ACCU-CHEK IDANIA) test strip 1 each by Other route 3 (three) times a day. Use as instructed 90 each 3   • Mirabegron ER (MYRBETRIQ) 50 MG tablet sustained-release 24 hour Take  by mouth.     • SITagliptin (JANUVIA) 100 MG tablet Take 1 tablet by mouth Daily. 28 tablet 0   • Glucosamine-Chondroit-Vit C-Mn (GLUCOSAMINE 1500 COMPLEX PO) Take 1 capsule by mouth 2 (Two) Times a Day.     • lisinopril-hydrochlorothiazide (PRINZIDE,ZESTORETIC) 10-12.5 MG per tablet Take 1 tablet by mouth Daily. 90 tablet 3   • tamsulosin (FLOMAX) 0.4 MG capsule 24 hr capsule Take 1 capsule by mouth.     • alfuzosin (UROXATRAL) 10 MG 24 hr tablet Take 10 mg by mouth Daily.     • cholestyramine (QUESTRAN) 4 g packet Take 1 packet by mouth 2 (Two) Times a Day With Meals. 60 packet 2   • Cyanocobalamin 2500 MCG sublingual tablet Place  under the tongue 3 (Three) Times a Week.     • Fluticasone Furoate-Vilanterol (BREO ELLIPTA) 200-25 MCG/INH aerosol powder  Inhale 1 puff Daily. 90 each 11   • HYDROcodone-acetaminophen (NORCO) 5-325 MG per tablet Take 2 tablets by mouth Every 4  "(Four) Hours As Needed for Moderate Pain 25 tablet 0   • metFORMIN (GLUCOPHAGE) 500 MG tablet Take 2 tablets by mouth 2 (Two) Times a Day With Meals. (Patient taking differently: Take 500 mg by mouth 2 (Two) Times a Day.) 180 tablet 11   • Omeprazole 20 MG tablet delayed-release Take 1 tablet by mouth Daily.     • ondansetron (ZOFRAN) 4 MG tablet Take 1 tablet by mouth Daily As Needed for Nausea or Vomiting 12 tablet 1     No facility-administered medications prior to visit.        Patient Active Problem List   Diagnosis   • Menstrual disorder   • Asthma with acute exacerbation   • Acute sinusitis   • Acute upper respiratory infection   • Asthma   • Benign prostatic hyperplasia   • Gastroesophageal reflux disease   • Hyperlipidemia   • Hypertension   • Obstructive sleep apnea syndrome   • Peripheral neuropathy   • Dyspnea on exertion   • Malignant neoplasm of skin   • Type 2 diabetes, controlled, with peripheral neuropathy   • Cobalamin deficiency   • Vitamin D deficiency   • Skin lesion of face   • Arthritis   • Calculus of gallbladder with acute cholecystitis without obstruction       Advance Care Planning:  has an advance directive - a copy HAS NOT been provided    Identification of Risk Factors:  Risk factors include: weight , cardiovascular risk and polypharmacy.    Review of Systems    Compared to one year ago, the patient feels his physical health is the same.  Compared to one year ago, the patient feels his mental health is the same.    Objective     Physical Exam    Vitals:    06/18/18 0858   BP: 148/67   BP Location: Left arm   Patient Position: Sitting   Cuff Size: Large Adult   Pulse: 62   Resp: 16   Temp: 97.4 °F (36.3 °C)   TempSrc: Oral   SpO2: 100%   Weight: 99.8 kg (220 lb)   Height: 185.4 cm (73\")       Patient's Body mass index is 29.03 kg/m². BMI is above normal parameters. Recommendations include: no follow-up required.      Assessment/Plan   Patient Self-Management and Personalized Health " Advice  The patient has been provided with information about: diet, exercise, weight management and fall prevention and preventive services including:   · Advance directive, Fall Risk assessment done, Fall Risk plan of care done, Hepatitis B vaccine.    Visit Diagnoses:    ICD-10-CM ICD-9-CM   1. Essential hypertension I10 401.9   2. Hyperlipidemia, unspecified hyperlipidemia type E78.5 272.4   3. Type 2 diabetes, controlled, with peripheral neuropathy E11.42 250.60     357.2       Orders Placed This Encounter   Procedures   • T4, Free   • TSH   • Comprehensive Metabolic Panel   • Vitamin B12   • Lipid Panel   • MicroAlbumin, Urine, Random - Urine, Clean Catch   • Hemoglobin A1c   • CBC & Differential     Order Specific Question:   Manual Differential     Answer:   No       Outpatient Encounter Prescriptions as of 6/18/2018   Medication Sig Dispense Refill   • ACCU-CHEK SOFTCLIX LANCETS lancets 1 each by Other route 3 (three) times a day. Use as instructed 300 each 3   • albuterol (VENTOLIN HFA) 108 (90 BASE) MCG/ACT inhaler Ventolin  (90 Base) MCG/ACT Inhalation Aerosol Solution; Patient Sig: Ventolin  (90 Base) MCG/ACT Inhalation Aerosol Solution INHALE 1 TO 2 PUFFS EVERY 4 TO 6 HOURS AS NEEDED.; 1; 11; 18-Aug-2015; Active     • Alcohol Swabs (B-D SINGLE USE SWABS REGULAR) pads 1 swab 3 (three) times a day. 90 each 3   • aspirin 81 MG EC tablet Take  by mouth daily.     • Blood Gluc Meter Disp-Strips device 1 strip 3 (three) times a day. Patient needs Accu-chek meter and test strips. 1 each 0   • Blood Glucose Calibration (SURECHEK CONTROL SOLUTION) NORMAL liquid 1 bottle by In Vitro route every 30 (thirty) days. 3 each 3   • carvedilol (COREG) 25 MG tablet Take 1 tablet by mouth 2 (Two) Times a Day With Meals. 180 tablet 1   • Cholecalciferol (VITAMIN D) 2000 UNITS tablet Take 1 capsule by mouth 3 (Three) Times a Week.     • Chromium-Cinnamon (CINNAMON PLUS CHROMIUM) 100-500 MCG-MG capsule Take 2  capsules by mouth 2 (two) times a day.     • finasteride (PROSCAR) 5 MG tablet Take 1 tablet by mouth Daily. 90 tablet 2   • glimepiride (AMARYL) 4 MG tablet Take 1 tablet by mouth Daily. 90 tablet 3   • glucose blood (ACCU-CHEK IDANIA) test strip 1 each by Other route 3 (three) times a day. Use as instructed 90 each 3   • Mirabegron ER (MYRBETRIQ) 50 MG tablet sustained-release 24 hour Take  by mouth.     • SITagliptin (JANUVIA) 100 MG tablet Take 1 tablet by mouth Daily. 28 tablet 0   • Glucosamine-Chondroit-Vit C-Mn (GLUCOSAMINE 1500 COMPLEX PO) Take 1 capsule by mouth 2 (Two) Times a Day.     • lisinopril-hydrochlorothiazide (PRINZIDE,ZESTORETIC) 10-12.5 MG per tablet Take 1 tablet by mouth Daily. 90 tablet 3   • tamsulosin (FLOMAX) 0.4 MG capsule 24 hr capsule Take 1 capsule by mouth.     • [DISCONTINUED] alfuzosin (UROXATRAL) 10 MG 24 hr tablet Take 10 mg by mouth Daily.     • [DISCONTINUED] cholestyramine (QUESTRAN) 4 g packet Take 1 packet by mouth 2 (Two) Times a Day With Meals. 60 packet 2   • [DISCONTINUED] Cyanocobalamin 2500 MCG sublingual tablet Place  under the tongue 3 (Three) Times a Week.     • [DISCONTINUED] Fluticasone Furoate-Vilanterol (BREO ELLIPTA) 200-25 MCG/INH aerosol powder  Inhale 1 puff Daily. 90 each 11   • [DISCONTINUED] HYDROcodone-acetaminophen (NORCO) 5-325 MG per tablet Take 2 tablets by mouth Every 4 (Four) Hours As Needed for Moderate Pain 25 tablet 0   • [DISCONTINUED] metFORMIN (GLUCOPHAGE) 500 MG tablet Take 2 tablets by mouth 2 (Two) Times a Day With Meals. (Patient taking differently: Take 500 mg by mouth 2 (Two) Times a Day.) 180 tablet 11   • [DISCONTINUED] Omeprazole 20 MG tablet delayed-release Take 1 tablet by mouth Daily.     • [DISCONTINUED] ondansetron (ZOFRAN) 4 MG tablet Take 1 tablet by mouth Daily As Needed for Nausea or Vomiting 12 tablet 1     No facility-administered encounter medications on file as of 6/18/2018.        Reviewed use of high risk medication in  the elderly: yes  Reviewed for potential of harmful drug interactions in the elderly: yes    Follow Up:  Return in about 4 months (around 10/18/2018).     An After Visit Summary and PPPS with all of these plans were given to the patient.    Pt will get hep b/a today repepeat prior going to [place].

## 2018-06-18 NOTE — PROGRESS NOTES
Subjective     Patient ID: Fazal Berg III is a 73 y.o. male. Patient is here for management of multiple medical problems.     Chief Complaint   Patient presents with   • Diabetes     follow-up, patient states his blood sugars have been dropping down into the 50s, he stopped taking Metformin    • Hyperlipidemia     follow-up   • Hypertension     patient states he has not been taking Lisinopril/HCTZ x 2 to 3 weeks     Diabetes   He presents for his follow-up diabetic visit. He has type 2 diabetes mellitus. His disease course has been improving. Hypoglycemia symptoms include confusion, dizziness, headaches, hunger, mood changes, nervousness/anxiousness and pallor. Associated symptoms include fatigue. Pertinent negatives for diabetes include no blurred vision and no chest pain. Hypoglycemia complications include nocturnal hypoglycemia. Pertinent negatives for hypoglycemia complications include no blackouts and no hospitalization. Symptoms are improving. There are no diabetic complications. Risk factors for coronary artery disease include dyslipidemia, diabetes mellitus, male sex, hypertension and sedentary lifestyle. Current diabetic treatment includes oral agent (monotherapy). He is compliant with treatment all of the time. He is following a diabetic diet. He has not had a previous visit with a dietitian. He participates in exercise three times a week. His home blood glucose trend is increasing rapidly. An ACE inhibitor/angiotensin II receptor blocker is being taken. He does not see a podiatrist.Eye exam is current.   Hyperlipidemia   This is a chronic problem. The problem is controlled. Recent lipid tests were reviewed and are high. He has no history of chronic renal disease, diabetes, hypothyroidism or liver disease. There are no known factors aggravating his hyperlipidemia. Pertinent negatives include no chest pain. Current antihyperlipidemic treatment includes bile acid squestrants. The current treatment  provides no improvement of lipids. There are no compliance problems.    Hypertension   This is a chronic problem. Associated symptoms include headaches. Pertinent negatives include no blurred vision or chest pain. Risk factors for coronary artery disease include diabetes mellitus, dyslipidemia, obesity and male gender. Past treatments include ACE inhibitors, direct vasodilators and beta blockers. Current antihypertension treatment includes ACE inhibitors, diuretics and beta blockers. The current treatment provides moderate improvement. There are no compliance problems.  There is no history of angina, kidney disease or CAD/MI. There is no history of chronic renal disease.      BS in the 80's had to stopp metfromin..        The following portions of the patient's history were reviewed and updated as appropriate: allergies, current medications, past family history, past medical history, past social history, past surgical history and problem list.    Review of Systems   Constitutional: Positive for fatigue.   Eyes: Negative for blurred vision.   Cardiovascular: Negative for chest pain.   Skin: Positive for pallor.   Neurological: Positive for dizziness and headaches.   Psychiatric/Behavioral: Positive for confusion. The patient is nervous/anxious.    All other systems reviewed and are negative.      Current Outpatient Prescriptions:   •  ACCU-CHEK SOFTCLIX LANCETS lancets, 1 each by Other route 3 (three) times a day. Use as instructed, Disp: 300 each, Rfl: 3  •  albuterol (VENTOLIN HFA) 108 (90 BASE) MCG/ACT inhaler, Ventolin  (90 Base) MCG/ACT Inhalation Aerosol Solution; Patient Sig: Ventolin  (90 Base) MCG/ACT Inhalation Aerosol Solution INHALE 1 TO 2 PUFFS EVERY 4 TO 6 HOURS AS NEEDED.; 1; 11; 18-Aug-2015; Active, Disp: , Rfl:   •  Alcohol Swabs (B-D SINGLE USE SWABS REGULAR) pads, 1 swab 3 (three) times a day., Disp: 90 each, Rfl: 3  •  aspirin 81 MG EC tablet, Take  by mouth daily., Disp: , Rfl:   •   "Blood Gluc Meter Disp-Strips device, 1 strip 3 (three) times a day. Patient needs Accu-chek meter and test strips., Disp: 1 each, Rfl: 0  •  Blood Glucose Calibration (SURECHEK CONTROL SOLUTION) NORMAL liquid, 1 bottle by In Vitro route every 30 (thirty) days., Disp: 3 each, Rfl: 3  •  carvedilol (COREG) 25 MG tablet, Take 1 tablet by mouth 2 (Two) Times a Day With Meals., Disp: 180 tablet, Rfl: 1  •  Cholecalciferol (VITAMIN D) 2000 UNITS tablet, Take 1 capsule by mouth 3 (Three) Times a Week., Disp: , Rfl:   •  Chromium-Cinnamon (CINNAMON PLUS CHROMIUM) 100-500 MCG-MG capsule, Take 2 capsules by mouth 2 (two) times a day., Disp: , Rfl:   •  finasteride (PROSCAR) 5 MG tablet, Take 1 tablet by mouth Daily., Disp: 90 tablet, Rfl: 2  •  glimepiride (AMARYL) 4 MG tablet, Take 1 tablet by mouth Daily., Disp: 90 tablet, Rfl: 3  •  glucose blood (ACCU-CHEK IDANIA) test strip, 1 each by Other route 3 (three) times a day. Use as instructed, Disp: 90 each, Rfl: 3  •  Mirabegron ER (MYRBETRIQ) 50 MG tablet sustained-release 24 hour, Take  by mouth., Disp: , Rfl:   •  SITagliptin (JANUVIA) 100 MG tablet, Take 1 tablet by mouth Daily., Disp: 28 tablet, Rfl: 0  •  Glucosamine-Chondroit-Vit C-Mn (GLUCOSAMINE 1500 COMPLEX PO), Take 1 capsule by mouth 2 (Two) Times a Day., Disp: , Rfl:   •  lisinopril-hydrochlorothiazide (PRINZIDE,ZESTORETIC) 10-12.5 MG per tablet, Take 1 tablet by mouth Daily., Disp: 90 tablet, Rfl: 3  •  tamsulosin (FLOMAX) 0.4 MG capsule 24 hr capsule, Take 1 capsule by mouth., Disp: , Rfl:     Objective      Blood pressure 148/67, pulse 62, temperature 97.4 °F (36.3 °C), temperature source Oral, resp. rate 16, height 185.4 cm (73\"), weight 99.8 kg (220 lb), SpO2 100 %.    Physical Exam     General Appearance:    Alert, cooperative, no distress, appears stated age   Head:    Normocephalic, without obvious abnormality, atraumatic   Eyes:    PERRL, conjunctiva/corneas clear, EOM's intact   Ears:    Normal TM's and " external ear canals, both ears   Nose:   Nares normal, septum midline, mucosa normal, no drainage   or sinus tenderness   Throat:   Lips, mucosa, and tongue normal; teeth and gums normal   Neck:   Supple, symmetrical, trachea midline, no adenopathy;        thyroid:  No enlargement/tenderness/nodules; no carotid    bruit or JVD   Back:     Symmetric, no curvature, ROM normal, no CVA tenderness   Lungs:     Clear to auscultation bilaterally, respirations unlabored   Chest wall:    No tenderness or deformity   Heart:    Regular rate and rhythm, S1 and S2 normal, no murmur,        rub or gallop   Abdomen:     Soft, non-tender, bowel sounds active all four quadrants,     no masses, no organomegaly   Extremities:   Extremities normal, atraumatic, no cyanosis or edema   Pulses:   2+ and symmetric all extremities   Skin:   Skin color, texture, turgor normal, no rashes or lesions   Lymph nodes:   Cervical, supraclavicular, and axillary nodes normal   Neurologic:   CNII-XII intact. Normal strength, sensation and reflexes       throughout      Results for orders placed or performed during the hospital encounter of 06/14/18   Stress test with myocardial perfusion one day   Result Value Ref Range    BH CV STRESS PROTOCOL 1 Pharmacologic     Stage 1 1     Duration Min Stage 1 0     Duration Sec Stage 1 10     Stress Dose Regadenoson Stage 1 0.4     Stress Comments Stage 1 10 sec bolus injection     Baseline HR 52 bpm    Baseline /87 mmHg    Peak HR 86 bpm    Percent Max Pred HR 57.72 %    Percent Target HR 68 %    Peak /68 mmHg    Recovery HR 70 bpm    Recovery /88 mmHg    Target HR (85%) 127 bpm    Max. Pred. HR (100%) 149 bpm    Nuc Stress EF 65 %         Assessment/Plan   Pt stopped lisionpril hctz due to low bs. Will get back on.  Last ha1c 7.0  3/2018.          Fazal was seen today for diabetes, hyperlipidemia and hypertension.    Diagnoses and all orders for this visit:    Essential hypertension  -     T4,  Free  -     TSH  -     Comprehensive Metabolic Panel  -     Vitamin B12  -     CBC & Differential  -     Lipid Panel    Hyperlipidemia, unspecified hyperlipidemia type  -     CBC & Differential  -     Lipid Panel    Type 2 diabetes, controlled, with peripheral neuropathy  -     MicroAlbumin, Urine, Random - Urine, Clean Catch  -     Hemoglobin A1c    Other orders  -     Hepatitis A Vaccine Adult IM  -     Hepatitis B Vaccine Adult IM      Return in about 4 months (around 10/18/2018), or hep a/b in 1 month.  hep b in 6 month..          There are no Patient Instructions on file for this visit.     Matthew Carr MD    Assessment/Plan

## 2018-06-26 ENCOUNTER — OFFICE VISIT (OUTPATIENT)
Dept: SURGERY | Facility: CLINIC | Age: 73
End: 2018-06-26

## 2018-06-26 VITALS
HEIGHT: 73 IN | HEART RATE: 64 BPM | OXYGEN SATURATION: 98 % | TEMPERATURE: 98.6 F | WEIGHT: 214 LBS | BODY MASS INDEX: 28.36 KG/M2 | RESPIRATION RATE: 18 BRPM | SYSTOLIC BLOOD PRESSURE: 122 MMHG | DIASTOLIC BLOOD PRESSURE: 84 MMHG

## 2018-06-26 DIAGNOSIS — Z48.89 POSTOPERATIVE VISIT: Primary | ICD-10-CM

## 2018-06-26 PROCEDURE — 99024 POSTOP FOLLOW-UP VISIT: CPT | Performed by: SURGERY

## 2018-06-26 RX ORDER — BISACODYL 5 MG/1
5 TABLET, DELAYED RELEASE ORAL DAILY PRN
Qty: 4 TABLET | Refills: 0 | Status: SHIPPED | OUTPATIENT
Start: 2018-06-26 | End: 2018-06-27

## 2018-06-26 RX ORDER — LANCETS
1 EACH MISCELLANEOUS 3 TIMES DAILY
Qty: 300 EACH | Refills: 3 | Status: SHIPPED | OUTPATIENT
Start: 2018-06-26

## 2018-06-26 RX ORDER — POLYETHYLENE GLYCOL 3350 17 G/17G
238 POWDER, FOR SOLUTION ORAL ONCE
Qty: 14 PACKET | Refills: 0 | Status: SHIPPED | OUTPATIENT
Start: 2018-06-26 | End: 2018-06-26

## 2018-06-26 NOTE — PROGRESS NOTES
Patient: Fazal Berg III    YOB: 1945    Date: 06/26/2018    Primary Care Provider: Matthew Carr MD    Chief Complaint   Patient presents with   • Diarrhea     evaluation.   • Post-op Follow-up     laparaoscopic cholecystectomy.       Subjective .     History of present illness:  Patient is doing well overall since his cholecystectomy more than a month ago.  Primarily here to schedule for screening colonoscopy and to follow-up his diarrhea.  He only occasionally uses the cholestyramine now.  He does stop the metformin which appears to have been the likely major etiology of his diarrhea since stopping that has vastly improved his symptoms.  No other issues currently.  His last colonoscopy was 10 years ago.    The following portions of the patient's history were reviewed and updated as appropriate: allergies, current medications, past family history, past medical history, past social history, past surgical history and problem list.      Review of Systems   Constitutional: Negative for chills, fever and unexpected weight change.   HENT: Negative for trouble swallowing and voice change.    Eyes: Negative for visual disturbance.   Respiratory: Negative for apnea, cough, chest tightness, shortness of breath and wheezing.    Cardiovascular: Negative for chest pain, palpitations and leg swelling.   Gastrointestinal: Positive for diarrhea. Negative for abdominal distention, abdominal pain, anal bleeding, blood in stool, constipation, nausea, rectal pain and vomiting.   Endocrine: Negative for cold intolerance and heat intolerance.   Genitourinary: Negative for difficulty urinating, dysuria, flank pain, scrotal swelling and testicular pain.   Musculoskeletal: Negative for back pain, gait problem and joint swelling.   Skin: Negative for color change, rash and wound.   Neurological: Negative for dizziness, syncope, speech difficulty, weakness, numbness and headaches.   Hematological: Negative for  "adenopathy. Does not bruise/bleed easily.   Psychiatric/Behavioral: Negative for confusion. The patient is not nervous/anxious.        History:  Past Medical History:   Diagnosis Date   • Acute asthma exacerbation    • Acute sinusitis    • Acute URI    • Allergic rhinitis    • Anemia    • Asthma    • Benign prostatic hypertrophy    • Bronchitis    • Chest pain     \"I'VE SEEN DR. CARRASCO FOR IT A LONG TIME AGO, AND THAT'S WHEN HE DID TESTING\".  STATES HAS NOT MENTIONED IT TO DR. KHAN.  STATES UNSURE OF WHEN LAST TIME WAS\".  PT STATES \"I'M NOT A COMPLAINER\".  STATES COLD WEATHER AND WALKING BRING IT ON.  GETS SHORTNESS OF BREATH OCCASSIONALLY.  MORE DESCRIBED AS PRESSURE.    • ED (erectile dysfunction)    • Family history of deafness and hearing loss    • Full dentures    • GERD (gastroesophageal reflux disease)    • H/O echocardiogram 2003   • Hiatal hernia    • [place] (hard of hearing)    • Hx of exercise stress test     \"AROUND 2003 WITH DR. CARRASCO\".  \"IT WAS OK\".     • Hyperlipidemia    • Hypertension    • Jaundice     AS A TEEN    • Obstructive sleep apnea     NO CPAP   • Peripheral neuropathy    • Pneumonia    • Prostate cancer    • Short of breath on exertion    • Skin cancer    • Type 2 diabetes mellitus    • Urinary frequency    • Vitamin B 12 deficiency    • Vitamin D deficiency    • Wears glasses        Past Surgical History:   Procedure Laterality Date   • APPENDECTOMY     • CARDIAC CATHETERIZATION  2003?    \"IT WAS OK\"   • CHOLECYSTECTOMY WITH INTRAOPERATIVE CHOLANGIOGRAM N/A 5/15/2018    Procedure: CHOLECYSTECTOMY LAPAROSCOPIC INTRAOPERATIVE CHOLANGIOGRAM;  Surgeon: Eric Bran MD;  Location: [facility];  Service: General   • COLONOSCOPY     • ENDOSCOPY     • HAND SURGERY Left    • KNEE ARTHROSCOPY Left        Family History   Problem Relation Age of Onset   • Diabetes Mother    • Diabetes Father    • Arthritis Other    • Gout Other    • Heart disease Other    • Lung disease Other        Social " History   Substance Use Topics   • Smoking status: Never Smoker   • Smokeless tobacco: Never Used   • Alcohol use No       Allergies:  Allergies   Allergen Reactions   • Statins Myalgia       Medications:    Current Outpatient Prescriptions:   •  albuterol (VENTOLIN HFA) 108 (90 BASE) MCG/ACT inhaler, Ventolin  (90 Base) MCG/ACT Inhalation Aerosol Solution; Patient Sig: Ventolin  (90 Base) MCG/ACT Inhalation Aerosol Solution INHALE 1 TO 2 PUFFS EVERY 4 TO 6 HOURS AS NEEDED.; 1; 11; 18-Aug-2015; Active, Disp: , Rfl:   •  aspirin 81 MG EC tablet, Take  by mouth daily., Disp: , Rfl:   •  carvedilol (COREG) 25 MG tablet, Take 1 tablet by mouth 2 (Two) Times a Day With Meals., Disp: 180 tablet, Rfl: 1  •  Cholecalciferol (VITAMIN D) 2000 UNITS tablet, Take 1 capsule by mouth 3 (Three) Times a Week., Disp: , Rfl:   •  Chromium-Cinnamon (CINNAMON PLUS CHROMIUM) 100-500 MCG-MG capsule, Take 2 capsules by mouth 2 (two) times a day., Disp: , Rfl:   •  finasteride (PROSCAR) 5 MG tablet, Take 1 tablet by mouth Daily., Disp: 90 tablet, Rfl: 2  •  glimepiride (AMARYL) 4 MG tablet, Take 1 tablet by mouth Daily., Disp: 90 tablet, Rfl: 3  •  Glucosamine-Chondroit-Vit C-Mn (GLUCOSAMINE 1500 COMPLEX PO), Take 1 capsule by mouth 2 (Two) Times a Day., Disp: , Rfl:   •  lisinopril-hydrochlorothiazide (PRINZIDE,ZESTORETIC) 10-12.5 MG per tablet, Take 1 tablet by mouth Daily., Disp: 90 tablet, Rfl: 3  •  Mirabegron ER (MYRBETRIQ) 50 MG tablet sustained-release 24 hour, Take  by mouth., Disp: , Rfl:   •  SITagliptin (JANUVIA) 100 MG tablet, Take 1 tablet by mouth Daily., Disp: 28 tablet, Rfl: 0  •  tamsulosin (FLOMAX) 0.4 MG capsule 24 hr capsule, Take 1 capsule by mouth., Disp: , Rfl:   •  ACCU-CHEK SOFTCLIX LANCETS lancets, 1 each by Other route 3 (three) times a day. Use as instructed, Disp: 300 each, Rfl: 3  •  Alcohol Swabs (B-D SINGLE USE SWABS REGULAR) pads, 1 swab 3 (three) times a day., Disp: 90 each, Rfl: 3  •  Blood  "Gluc Meter Disp-Strips device, 1 strip 3 (three) times a day. Patient needs Accu-chek meter and test strips., Disp: 1 each, Rfl: 0  •  Blood Glucose Calibration (SURECHEK CONTROL SOLUTION) NORMAL liquid, 1 bottle by In Vitro route every 30 (thirty) days., Disp: 3 each, Rfl: 3  •  glucose blood (ACCU-CHEK IDANIA) test strip, 1 each by Other route 3 (three) times a day. Use as instructed, Disp: 90 each, Rfl: 3    Objective     Vital Signs:   Vitals:    06/26/18 0914   BP: 122/84   Pulse: 64   Resp: 18   Temp: 98.6 °F (37 °C)   TempSrc: Temporal Artery    SpO2: 98%   Weight: 97.1 kg (214 lb)   Height: 185.4 cm (73\")       Physical Exam:   General Appearance:    Alert, cooperative, in no acute distress. Abdomen is benign      Results Review:   I reviewed the patient's new clinical results.    Review of Systems was reviewed and confirmed as accurate today.    Assessment/Plan :    1. Postoperative visit    Doing well after his cholecystectomy.  No further issues.  Diarrhea has improved after stopping the metformin.  From the standpoint follow-up as needed.      I recommend a colonoscopy for further evaluation  From a screening standpoint. The procedure was explained as well as the risks which include but are not limited to bleeding, infection, perforation, abdominal pain etc. The patient understands these risks and the procedure and wishes to proceed.     Electronically signed by Eric Bran MD  06/26/18 9:15 AM      Portions of this note have been scribed for Eric Bran MD by Shazia De La Cruz. 6/26/2018  9:26 AM    "

## 2018-06-26 NOTE — TELEPHONE ENCOUNTER
Patient need refill sent to [facility] for Accu-Chek test strips and lancets. He would also like a new glucometer.

## 2018-07-06 ENCOUNTER — PREP FOR SURGERY (OUTPATIENT)
Dept: OTHER | Facility: HOSPITAL | Age: 73
End: 2018-07-06

## 2018-07-06 DIAGNOSIS — Z12.11 ENCOUNTER FOR SCREENING COLONOSCOPY: Primary | ICD-10-CM

## 2018-07-06 RX ORDER — CARVEDILOL 25 MG/1
25 TABLET ORAL 2 TIMES DAILY WITH MEALS
Qty: 180 TABLET | Refills: 3 | Status: SHIPPED | OUTPATIENT
Start: 2018-07-06 | End: 2018-07-10 | Stop reason: SDUPTHER

## 2018-07-10 RX ORDER — CARVEDILOL 25 MG/1
25 TABLET ORAL 2 TIMES DAILY WITH MEALS
Qty: 180 TABLET | Refills: 3 | Status: SHIPPED | OUTPATIENT
Start: 2018-07-10 | End: 2019-12-16 | Stop reason: SINTOL

## 2018-07-17 PROBLEM — Z12.11 ENCOUNTER FOR SCREENING COLONOSCOPY: Status: ACTIVE | Noted: 2018-07-17

## 2018-07-26 ENCOUNTER — TELEPHONE (OUTPATIENT)
Dept: SURGERY | Facility: CLINIC | Age: 73
End: 2018-07-26

## 2018-07-26 RX ORDER — POLYETHYLENE GLYCOL 3350 17 G/17G
238 POWDER, FOR SOLUTION ORAL ONCE
Qty: 14 PACKET | Refills: 0 | Status: SHIPPED | OUTPATIENT
Start: 2018-07-26 | End: 2018-07-26

## 2018-07-31 ENCOUNTER — CLINICAL SUPPORT (OUTPATIENT)
Dept: INTERNAL MEDICINE | Facility: CLINIC | Age: 73
End: 2018-07-31

## 2018-07-31 PROCEDURE — G0010 ADMIN HEPATITIS B VACCINE: HCPCS | Performed by: NURSE PRACTITIONER

## 2018-07-31 PROCEDURE — 90746 HEPB VACCINE 3 DOSE ADULT IM: CPT | Performed by: NURSE PRACTITIONER

## 2018-08-06 ENCOUNTER — TELEPHONE (OUTPATIENT)
Dept: SURGERY | Facility: CLINIC | Age: 73
End: 2018-08-06

## 2018-08-06 RX ORDER — BISACODYL 5 MG/1
5 TABLET, DELAYED RELEASE ORAL DAILY
Qty: 4 TABLET | Refills: 0 | Status: SHIPPED | OUTPATIENT
Start: 2018-08-06

## 2018-08-07 ENCOUNTER — OFFICE VISIT (OUTPATIENT)
Dept: UROLOGY | Facility: CLINIC | Age: 73
End: 2018-08-07

## 2018-08-07 VITALS
RESPIRATION RATE: 16 BRPM | WEIGHT: 215 LBS | OXYGEN SATURATION: 98 % | SYSTOLIC BLOOD PRESSURE: 126 MMHG | HEIGHT: 72 IN | HEART RATE: 57 BPM | DIASTOLIC BLOOD PRESSURE: 72 MMHG | BODY MASS INDEX: 29.12 KG/M2

## 2018-08-07 DIAGNOSIS — C61 MALIGNANT NEOPLASM OF PROSTATE (HCC): Primary | ICD-10-CM

## 2018-08-07 DIAGNOSIS — C61 PROSTATE CANCER (HCC): ICD-10-CM

## 2018-08-07 PROCEDURE — 81003 URINALYSIS AUTO W/O SCOPE: CPT | Performed by: UROLOGY

## 2018-08-07 PROCEDURE — 99213 OFFICE O/P EST LOW 20 MIN: CPT | Performed by: UROLOGY

## 2018-08-07 NOTE — PROGRESS NOTES
"Chief Complaint  Prostate Cancer (6month fup with psa. 1.100 on 7/31/2018)        TIFFANY Berg is a 73 y.o. male who returns today for follow-up with a diagnosis of prostate cancer that was found on biopsy to be low-grade and early stage and is being managed with a program of active surveillance.  Since taking Proscar to shrink the enlarged gland his PSA has dropped from 10 to a more recent 1.1.  He remains asymptomatic and is not anxious for treatment.  At the age of 73 he will be a good candidate for CyberKnife radiation at anytime the tumor seems to be progressing.    Vitals:    08/07/18 0914   BP: 126/72   Pulse: 57   Resp: 16   SpO2: 98%       Past Medical History  Past Medical History:   Diagnosis Date   • Acute asthma exacerbation    • Acute sinusitis    • Acute URI    • Allergic rhinitis    • Anemia    • Arthritis    • Asthma    • Benign prostatic hypertrophy    • Bronchitis    • Chest pain     \"I'VE SEEN DR. CARRASCO FOR IT A LONG TIME AGO, AND THAT'S WHEN HE DID TESTING\".  STATES HAS NOT MENTIONED IT TO DR. KHAN.  STATES UNSURE OF WHEN LAST TIME WAS\".  PT STATES \"I'M NOT A COMPLAINER\".  STATES COLD WEATHER AND WALKING BRING IT ON.  GETS SHORTNESS OF BREATH OCCASSIONALLY.  MORE DESCRIBED AS PRESSURE.    • ED (erectile dysfunction)    • Elevated cholesterol    • Family history of deafness and hearing loss    • Full dentures     INSTRUCTED NO ADHESIVES THE DOS   • GERD (gastroesophageal reflux disease)    • H/O echocardiogram 2003   • Hiatal hernia    • History of fracture     REPORTS TOE ON RIGHT FOOT - NO SURGICAL INTERVENTION REQUIRED   • [place] (hard of hearing)     SPOUSE REPORTS NO USE OF HEARING AIDS   • Hx of exercise stress test     SPOUSE REPORTS LAST DONE BY DR ALMANZA AROUND JUNE 2018 AND REPORTED ALL WNL'S AND MD RELEASED PATIENT   • Hyperlipidemia    • Hypertension    • Jaundice     AS A TEEN    • Obstructive sleep apnea     NO CPAP - SPOUSE REPORTS \"HE WOULDN'T DO IT\"   • Peripheral neuropathy  " "  • Pneumonia     SPOUSE REPORTS HISTORY   • Prostate cancer (CMS/HCC)    • Short of breath on exertion    • Skin cancer    • Type 2 diabetes mellitus (CMS/HCC)    • Urinary frequency    • Vitamin B 12 deficiency    • Vitamin D deficiency    • Wears glasses        Past Surgical History  Past Surgical History:   Procedure Laterality Date   • APPENDECTOMY     • CARDIAC CATHETERIZATION  2003?    \"IT WAS OK\"   • CHOLECYSTECTOMY WITH INTRAOPERATIVE CHOLANGIOGRAM N/A 5/15/2018    Procedure: CHOLECYSTECTOMY LAPAROSCOPIC INTRAOPERATIVE CHOLANGIOGRAM;  Surgeon: Eric Bran MD;  Location: [facility];  Service: General   • COLONOSCOPY     • ENDOSCOPY     • HAND SURGERY Left    • KNEE ARTHROSCOPY Left    • MOUTH SURGERY      FULL MOUTH EXTRACTION       Medications  has a current medication list which includes the following prescription(s): accu-chek softclix lancets, albuterol, b-d single use swabs regular, aspirin, bisacodyl, blood gluc meter disp-strips, surechek control solution, carvedilol, vitamin d, chromium-cinnamon, finasteride, glimepiride, glucosamine-chondroit-vit c-mn, glucose blood, lisinopril-hydrochlorothiazide, and sitagliptin.      Allergies  Allergies   Allergen Reactions   • Statins Myalgia       Social History  Social History     Social History Narrative   • No narrative on file       Family History  He has no family history of bladder or kidney cancer  He has no family history of kidney stones      AUA Symptom Score:      Review of Systems  Review of Systems    Physical Exam  Physical Exam   Constitutional: He is oriented to person, place, and time. He appears well-developed and well-nourished.   HENT:   Head: Normocephalic and atraumatic.   Neck: Normal range of motion.   Pulmonary/Chest: Effort normal. No respiratory distress.   Abdominal: Soft. He exhibits no distension and no mass. There is no tenderness. No hernia.   Genitourinary: Rectum normal and prostate normal.   Musculoskeletal: Normal range " of motion.   Lymphadenopathy:     He has no cervical adenopathy.   Neurological: He is alert and oriented to person, place, and time.   Skin: Skin is warm and dry.   Psychiatric: He has a normal mood and affect. His behavior is normal.   Vitals reviewed.      Labs Recent and today in the office:  Results for orders placed or performed in visit on 08/07/18   POC Urinalysis Dipstick, Automated   Result Value Ref Range    Color Yellow Yellow, Straw, Dark Yellow, Mary    Clarity, UA Clear Clear    Specific Gravity  1.025 1.005 - 1.030    pH, Urine 5.5 5.0 - 8.0    Leukocytes Negative Negative    Nitrite, UA Negative Negative    Protein, POC Negative Negative mg/dL    Glucose, UA Negative Negative, 1000 mg/dL (3+) mg/dL    Ketones, UA Negative Negative    Urobilinogen, UA Normal Normal    Bilirubin Negative Negative    Blood, UA Negative Negative         Assessment & Plan  Prostate cancer: Seems to be early stage low-grade and not progressing with a decline in his PSA since diagnosis from 10-1.  At anytime there is any palpable change or rise in his PSA I would recommend an MRI to look for occult disease and consider proceeding with radiation.

## 2018-08-08 ENCOUNTER — HOSPITAL ENCOUNTER (OUTPATIENT)
Facility: HOSPITAL | Age: 73
Setting detail: HOSPITAL OUTPATIENT SURGERY
Discharge: HOME | End: 2018-08-08
Attending: SURGERY | Admitting: SURGERY

## 2018-08-08 ENCOUNTER — ANESTHESIA (OUTPATIENT)
Dept: GASTROENTEROLOGY | Facility: HOSPITAL | Age: 73
End: 2018-08-08

## 2018-08-08 ENCOUNTER — ANESTHESIA EVENT (OUTPATIENT)
Dept: GASTROENTEROLOGY | Facility: HOSPITAL | Age: 73
End: 2018-08-08

## 2018-08-08 VITALS
DIASTOLIC BLOOD PRESSURE: 72 MMHG | WEIGHT: 215 LBS | RESPIRATION RATE: 18 BRPM | BODY MASS INDEX: 29.12 KG/M2 | HEART RATE: 54 BPM | HEIGHT: 72 IN | OXYGEN SATURATION: 96 % | SYSTOLIC BLOOD PRESSURE: 148 MMHG | TEMPERATURE: 98.2 F

## 2018-08-08 DIAGNOSIS — Z12.11 ENCOUNTER FOR SCREENING COLONOSCOPY: ICD-10-CM

## 2018-08-08 PROCEDURE — S0260 H&P FOR SURGERY: HCPCS | Performed by: SURGERY

## 2018-08-08 PROCEDURE — 25010000002 PROPOFOL 1000 MG/100ML EMULSION: Performed by: NURSE ANESTHETIST, CERTIFIED REGISTERED

## 2018-08-08 RX ORDER — PROPOFOL 10 MG/ML
INJECTION, EMULSION INTRAVENOUS AS NEEDED
Status: DISCONTINUED | OUTPATIENT
Start: 2018-08-08 | End: 2018-08-08 | Stop reason: SURG

## 2018-08-08 RX ORDER — ONDANSETRON 2 MG/ML
4 INJECTION INTRAMUSCULAR; INTRAVENOUS ONCE AS NEEDED
Status: DISCONTINUED | OUTPATIENT
Start: 2018-08-08 | End: 2018-08-08 | Stop reason: HOSPADM

## 2018-08-08 RX ORDER — SODIUM CHLORIDE, SODIUM LACTATE, POTASSIUM CHLORIDE, CALCIUM CHLORIDE 600; 310; 30; 20 MG/100ML; MG/100ML; MG/100ML; MG/100ML
1000 INJECTION, SOLUTION INTRAVENOUS CONTINUOUS
Status: DISCONTINUED | OUTPATIENT
Start: 2018-08-08 | End: 2018-08-08 | Stop reason: HOSPADM

## 2018-08-08 RX ORDER — SODIUM CHLORIDE 0.9 % (FLUSH) 0.9 %
3 SYRINGE (ML) INJECTION AS NEEDED
Status: DISCONTINUED | OUTPATIENT
Start: 2018-08-08 | End: 2018-08-08 | Stop reason: HOSPADM

## 2018-08-08 RX ADMIN — PROPOFOL 50 MG: 10 INJECTION, EMULSION INTRAVENOUS at 11:58

## 2018-08-08 RX ADMIN — PROPOFOL 50 MG: 10 INJECTION, EMULSION INTRAVENOUS at 11:53

## 2018-08-08 RX ADMIN — LIDOCAINE HYDROCHLORIDE 80 MG: 20 INJECTION, SOLUTION INTRAVENOUS at 11:45

## 2018-08-08 RX ADMIN — PROPOFOL 80 MG: 10 INJECTION, EMULSION INTRAVENOUS at 11:45

## 2018-08-08 RX ADMIN — SODIUM CHLORIDE, POTASSIUM CHLORIDE, SODIUM LACTATE AND CALCIUM CHLORIDE 1000 ML: 600; 310; 30; 20 INJECTION, SOLUTION INTRAVENOUS at 10:49

## 2018-08-08 NOTE — ANESTHESIA POSTPROCEDURE EVALUATION
Patient: Fazal Berg III    Procedure Summary     Date:  08/08/18 Room / Location:  [facility] ENDOSCOPY 2 / [facility] ENDOSCOPY    Anesthesia Start:  1140 Anesthesia Stop:      Procedure:  COLONOSCOPY (N/A Anus) Diagnosis:       Encounter for screening colonoscopy      (Encounter for screening colonoscopy [Z12.11])    Surgeon:  Eric Bran MD Provider:  Fred Sinclair CRNA    Anesthesia Type:  MAC ASA Status:  2          Anesthesia Type: MAC  Last vitals  BP   136/61   Temp   97.1   Pulse   55   Resp   20    SpO2   98     Post Anesthesia Care and Evaluation    Patient location during evaluation: PACU  Patient participation: complete - patient participated  Level of consciousness: awake and alert  Pain score: 0  Pain management: satisfactory to patient  Airway patency: patent  Anesthetic complications: No anesthetic complications  PONV Status: none  Cardiovascular status: acceptable and stable  Respiratory status: acceptable and nasal cannula  Hydration status: acceptable

## 2018-08-08 NOTE — ANESTHESIA PREPROCEDURE EVALUATION
Anesthesia Evaluation     Patient summary reviewed and Nursing notes reviewed   NPO Solid Status: > 8 hours  NPO Liquid Status: > 8 hours           Airway   Mallampati: II  TM distance: >3 FB  Neck ROM: full  No difficulty expected  Dental - normal exam     Pulmonary - normal exam   (+) pneumonia resolved , asthma, shortness of breath, recent URI resolved, sleep apnea,     ROS comment: Noncompliant with CPAP  Cardiovascular - normal exam  Exercise tolerance: good (4-7 METS)    ECG reviewed  Patient on routine beta blocker and Beta blocker given within 24 hours of surgery    (+) hypertension well controlled 2 medications or greater, hyperlipidemia,     ROS comment: 6/14/2018    Stress test:  Technically adequate study   1) No evidence for inducible ischemia on perfusion images   2) Normal LV systolic function ( EF 65%) with normal LVedv    Neuro/Psych  (+) numbness,     GI/Hepatic/Renal/Endo    (+)  hiatal hernia, GERD well controlled,  diabetes mellitus type 2,     Musculoskeletal     Abdominal  - normal exam   Substance History - negative use     OB/GYN negative ob/gyn ROS         Other   (+) arthritis   history of cancer      Other Comment: Skin and prostate CA                  Anesthesia Plan    ASA 2     MAC   (Patient advised that intravenous sedation would be utilized as primary anesthetic technique. Every effort will be made to make sure patient is comfortable. Patient advised that they may experience recall of events of the procedure. Patient verbalized understanding and agreed to plan. )  intravenous induction   Anesthetic plan and risks discussed with patient.

## 2018-08-08 NOTE — DISCHARGE INSTRUCTIONS
Please follow all post op instructions and follow up appointment time from your physician's office included in your discharge packet.    No pushing, pulling, tugging,  heavy lifting, or strenuous activity.  No major decision making, driving, or drinking alcoholic beverages for 24 hours. ( due to the medications you have  received)  Always use good hand hygiene/washing techniques.  NO driving while taking pain medications.     To assist you in voiding:  Drink plenty of fluids  Listen to running water while attempting to void.    If you are unable to urinate and you have an uncomfortable urge to void or it has been   6 hours since you were discharged, return to the Emergency Room

## 2018-08-08 NOTE — H&P
"    [facility]      Name:  Fazal Berg III   Age:  73 y.o.  Sex:  male  :  1945  MRN:  [id number]   Visit Number:  [id number]  Admission Date:  2018  Date Of Service:  18  Primary Care Physician:  Matthew Carr MD    Chief Complaint:     I need a colonoscopy    History Of Presenting Illness:      Patient here for screening colonoscopy.  Last colonoscopy was more than 10 years ago.  No GI complaints.    Review Of Systems:     General ROS: Patient denies any fevers, chills or loss of consciousness.  No complaints of generalized weakness  Psychological ROS: Denies any hallucinations and delusions.  Respiratory ROS: Denies cough or shortness of breath.   Cardiovascular ROS: Denies chest pain or palpitations. No history of exertional chest pain.   Gastrointestinal ROS: Denies nausea and vomiting. Denies any abdominal pain. No diarrhea.   Genito-Urinary ROS: Denies dysuria or hematuria.  Neurological ROS: Denies any focal weakness. No loss of consciousness. Denies any numbness.   Dermatological ROS: Denies any redness or pruritis.     Past Medical History:    Past Medical History:   Diagnosis Date   • Acute asthma exacerbation    • Acute sinusitis    • Acute URI    • Allergic rhinitis    • Anemia    • Arthritis    • Asthma    • Benign prostatic hypertrophy    • Bronchitis    • Chest pain     \"I'VE SEEN DR. CARRASCO FOR IT A LONG TIME AGO, AND THAT'S WHEN HE DID TESTING\".  STATES HAS NOT MENTIONED IT TO DR. CARR.  STATES UNSURE OF WHEN LAST TIME WAS\".  PT STATES \"I'M NOT A COMPLAINER\".  STATES COLD WEATHER AND WALKING BRING IT ON.  GETS SHORTNESS OF BREATH OCCASSIONALLY.  MORE DESCRIBED AS PRESSURE.    • ED (erectile dysfunction)    • Elevated cholesterol    • Family history of deafness and hearing loss    • Full dentures     INSTRUCTED NO ADHESIVES THE DOS   • GERD (gastroesophageal reflux disease)    • H/O echocardiogram    • Hiatal hernia  " "  • History of fracture     REPORTS TOE ON RIGHT FOOT - NO SURGICAL INTERVENTION REQUIRED   • [place] (hard of hearing)     SPOUSE REPORTS NO USE OF HEARING AIDS   • Hx of exercise stress test     SPOUSE REPORTS LAST DONE BY DR ALMANZA AROUND JUNE 2018 AND REPORTED ALL WNL'S AND MD RELEASED PATIENT   • Hyperlipidemia    • Hypertension    • Jaundice     AS A TEEN    • Obstructive sleep apnea     NO CPAP - SPOUSE REPORTS \"HE WOULDN'T DO IT\"   • Peripheral neuropathy    • Pneumonia     SPOUSE REPORTS HISTORY   • Prostate cancer (CMS/HCC)    • Short of breath on exertion    • Skin cancer    • Type 2 diabetes mellitus (CMS/HCC)    • Urinary frequency    • Vitamin B 12 deficiency    • Vitamin D deficiency    • Wears glasses        Past Surgical history:    Past Surgical History:   Procedure Laterality Date   • APPENDECTOMY     • CARDIAC CATHETERIZATION  2003?    \"IT WAS OK\"   • CHOLECYSTECTOMY WITH INTRAOPERATIVE CHOLANGIOGRAM N/A 5/15/2018    Procedure: CHOLECYSTECTOMY LAPAROSCOPIC INTRAOPERATIVE CHOLANGIOGRAM;  Surgeon: Eric Bran MD;  Location: [facility];  Service: General   • COLONOSCOPY     • ENDOSCOPY     • HAND SURGERY Left    • KNEE ARTHROSCOPY Left    • MOUTH SURGERY      FULL MOUTH EXTRACTION       Social History:    Social History     Social History   • Marital status:      Spouse name: N/A   • Number of children: N/A   • Years of education: N/A     Occupational History   •  Retired     Social History Main Topics   • Smoking status: Never Smoker   • Smokeless tobacco: Never Used   • Alcohol use No   • Drug use: No   • Sexual activity: Defer     Other Topics Concern   • Not on file     Social History Narrative   • No narrative on file       Family History:    Family History   Problem Relation Age of Onset   • Diabetes Mother    • Diabetes Father    • Arthritis Other    • Gout Other    • Heart disease Other    • Lung disease Other        Allergies:      Statins    Home Medications:    Prior to Admission " Medications     Prescriptions Last Dose Informant Patient Reported? Taking?    ACCU-CHEK SOFTCLIX LANCETS lancets 8/8/2018 Spouse/Significant Other No Yes    1 each by Other route 3 (Three) Times a Day. Use as instructed    albuterol (VENTOLIN HFA) 108 (90 BASE) MCG/ACT inhaler 8/5/2018 Spouse/Significant Other Yes Yes    Ventolin  (90 Base) MCG/ACT Inhalation Aerosol Solution; Patient Sig: Ventolin  (90 Base) MCG/ACT Inhalation Aerosol Solution INHALE 1 TO 2 PUFFS EVERY 4 TO 6 HOURS AS NEEDED.; 1; 11; 18-Aug-2015; Active    Alcohol Swabs (B-D SINGLE USE SWABS REGULAR) pads 8/8/2018 Self No Yes    1 swab 3 (three) times a day.    aspirin 81 MG EC tablet 8/5/2018 Spouse/Significant Other Yes Yes    Take 81 mg by mouth Daily.    bisacodyl (bisacodyl) 5 MG EC tablet 8/7/2018  No Yes    Take 1 tablet by mouth Daily.    Blood Gluc Meter Disp-Strips device 8/8/2018 Spouse/Significant Other No Yes    1 strip 3 (Three) Times a Day. Patient needs Accu-chek meter and test strips.    Blood Glucose Calibration (SURECHEK CONTROL SOLUTION) NORMAL liquid 8/8/2018 Spouse/Significant Other No Yes    1 bottle by In Vitro route every 30 (thirty) days.    carvedilol (COREG) 25 MG tablet 8/7/2018 Spouse/Significant Other No Yes    Take 1 tablet by mouth 2 (Two) Times a Day With Meals.    Cholecalciferol (VITAMIN D) 2000 UNITS tablet 8/7/2018 Spouse/Significant Other Yes Yes    Take 1 capsule by mouth 3 (Three) Times a Week.    Chromium-Cinnamon (CINNAMON PLUS CHROMIUM) 100-500 MCG-MG capsule 8/7/2018 Spouse/Significant Other Yes Yes    Take 2 capsules by mouth 2 (two) times a day.    finasteride (PROSCAR) 5 MG tablet 8/7/2018 Spouse/Significant Other No Yes    Take 1 tablet by mouth Daily.    glimepiride (AMARYL) 4 MG tablet 8/7/2018 Spouse/Significant Other No Yes    Take 1 tablet by mouth Daily.    Glucosamine-Chondroit-Vit C-Mn (GLUCOSAMINE 1500 COMPLEX PO) 8/7/2018 Spouse/Significant Other Yes Yes    Take 1 capsule by  mouth 2 (Two) Times a Day.    glucose blood (ACCU-CHEK IDANIA) test strip 8/8/2018 Spouse/Significant Other No Yes    1 each by Other route 3 (Three) Times a Day. Use as instructed    lisinopril-hydrochlorothiazide (PRINZIDE,ZESTORETIC) 10-12.5 MG per tablet 8/7/2018 Spouse/Significant Other No Yes    Take 1 tablet by mouth Daily.    SITagliptin (JANUVIA) 100 MG tablet 8/7/2018 Spouse/Significant Other No Yes    Take 1 tablet by mouth Daily.             ED Medications:    Medications   sodium chloride 0.9 % flush 3 mL (not administered)   lactated ringers infusion 1,000 mL (1,000 mL Intravenous New Bag 8/8/18 1049)       Vital Signs:    Temp:  [97.6 °F (36.4 °C)] 97.6 °F (36.4 °C)  Heart Rate:  [69] 69  Resp:  [18] 18  BP: (139)/(77) 139/77    1    08/06/18  0909   Weight: 97.5 kg (215 lb)       Body mass index is 29.16 kg/m².    Physical Exam:      General Appearance:  Alert and cooperative, not in any acute distress.   Head:  Atraumatic and normocephalic, without obvious abnormality.   Eyes:          PERRLA, conjunctivae and sclerae normal, no Icterus. No pallor. Extra-occular movements are within normal limits.   Ears:  Ears appear intact with no abnormalities noted.   Respiratory/Lungs:   Breath sounds heard bilaterally equally.  No crackles or wheezing. No Pleural rub or bronchial breathing. Normal respiratory effort.    Cardiovascular/Heart:  Normal S1 and S2,    GI/Abdomen:   No masses, no hepatosplenomegaly. Soft, non-tender, non-distended, no hernia                 Musculoskeletal/ Extremities:   Moves all extremities well   Skin: No bleeding, bruising or rash, no induration   Psychiatric : Alert and oriented ×3.  No depression or anxiety    Neurologic: Cranial nerves 2 - 12 grossly intact, sensation intact, Motor power is normal and equal bilaterally.       EKG:          Labs:    Lab Results (last 24 hours)     ** No results found for the last 24 hours. **          Radiology:    Imaging Results (last 72  hours)     ** No results found for the last 72 hours. **          Assessment:    Screening colonoscopy     Plan:     I recommend a screening colonoscopy to the patient.  The patient understands the procedure and the reason for the procedure.  The patient also understands the risks which include but are not limited to bleeding and perforation and they understand the ramifications of these potential complications and wish to proceed.    Eric Bran MD  08/08/18  11:39 AM

## 2018-08-10 LAB
LAB AP CASE REPORT: NORMAL
PATH REPORT.FINAL DX SPEC: NORMAL

## 2018-09-19 ENCOUNTER — TELEPHONE (OUTPATIENT)
Dept: SURGERY | Facility: CLINIC | Age: 73
End: 2018-09-19

## 2018-09-19 NOTE — TELEPHONE ENCOUNTER
Called the patient to remind them of an upcoming appointment with general surgery. I was unable to reach to reach the patient. Mail box full

## 2018-09-20 ENCOUNTER — OFFICE VISIT (OUTPATIENT)
Dept: SURGERY | Facility: CLINIC | Age: 73
End: 2018-09-20

## 2018-09-20 VITALS
OXYGEN SATURATION: 99 % | DIASTOLIC BLOOD PRESSURE: 80 MMHG | BODY MASS INDEX: 29.55 KG/M2 | HEIGHT: 72 IN | WEIGHT: 218.2 LBS | SYSTOLIC BLOOD PRESSURE: 122 MMHG | HEART RATE: 82 BPM | TEMPERATURE: 98 F

## 2018-09-20 DIAGNOSIS — C44.92 SQUAMOUS CELL SKIN CANCER: Primary | ICD-10-CM

## 2018-09-20 PROCEDURE — 99213 OFFICE O/P EST LOW 20 MIN: CPT | Performed by: SURGERY

## 2018-09-20 NOTE — PROGRESS NOTES
Patient: Fazal Berg III    YOB: 1945    Date: 09/20/2018    Primary Care Provider: Matthew Carr MD    Reason for Consultation: Lesion    Chief Complaint   Patient presents with   • Skin Lesion     scalp lesions       Subjective .     History of present illness: Patient here for evaluation of scalp skin cancer.  His dermatologist recently did shave biopsies of several lesions on his scalp.  He has no other significant complaints.    The following portions of the patient's history were reviewed and updated as appropriate: allergies, current medications, past family history, past medical history, past social history, past surgical history and problem list..    Review of Systems   Constitutional: Negative for chills, fever and unexpected weight change.   HENT: Negative for trouble swallowing and voice change.    Eyes: Negative for visual disturbance.   Respiratory: Negative for apnea, cough, chest tightness, shortness of breath and wheezing.    Cardiovascular: Negative for chest pain, palpitations and leg swelling.   Gastrointestinal: Negative for abdominal distention, abdominal pain, anal bleeding, blood in stool, constipation, diarrhea, nausea, rectal pain and vomiting.   Endocrine: Negative for cold intolerance and heat intolerance.   Genitourinary: Negative for difficulty urinating, dysuria, flank pain, scrotal swelling and testicular pain.   Musculoskeletal: Negative for back pain, gait problem and joint swelling.   Skin: Positive for wound (scalp lesions). Negative for color change and rash.   Neurological: Negative for dizziness, syncope, speech difficulty, weakness, numbness and headaches.   Hematological: Negative for adenopathy. Does not bruise/bleed easily.   Psychiatric/Behavioral: Negative for confusion. The patient is not nervous/anxious.        History:  Past Medical History:   Diagnosis Date   • Acute asthma exacerbation    • Acute sinusitis    • Acute URI    • Allergic  "rhinitis    • Anemia    • Arthritis    • Asthma    • Benign prostatic hypertrophy    • Bronchitis    • Chest pain     \"I'VE SEEN DR. CARRASCO FOR IT A LONG TIME AGO, AND THAT'S WHEN HE DID TESTING\".  STATES HAS NOT MENTIONED IT TO DR. KHAN.  STATES UNSURE OF WHEN LAST TIME WAS\".  PT STATES \"I'M NOT A COMPLAINER\".  STATES COLD WEATHER AND WALKING BRING IT ON.  GETS SHORTNESS OF BREATH OCCASSIONALLY.  MORE DESCRIBED AS PRESSURE.    • ED (erectile dysfunction)    • Elevated cholesterol    • Family history of deafness and hearing loss    • Full dentures     INSTRUCTED NO ADHESIVES THE DOS   • GERD (gastroesophageal reflux disease)    • H/O echocardiogram 2003   • Hiatal hernia    • History of fracture     REPORTS TOE ON RIGHT FOOT - NO SURGICAL INTERVENTION REQUIRED   • [place] (hard of hearing)     SPOUSE REPORTS NO USE OF HEARING AIDS   • Hx of exercise stress test     SPOUSE REPORTS LAST DONE BY DR ALMANZA AROUND JUNE 2018 AND REPORTED ALL WNL'S AND MD RELEASED PATIENT   • Hyperlipidemia    • Hypertension    • Jaundice     AS A TEEN    • Obstructive sleep apnea     NO CPAP - SPOUSE REPORTS \"HE WOULDN'T DO IT\"   • Peripheral neuropathy    • Pneumonia     SPOUSE REPORTS HISTORY   • Prostate cancer (CMS/HCC)    • Short of breath on exertion    • Skin cancer    • Type 2 diabetes mellitus (CMS/HCC)    • Urinary frequency    • Vitamin B 12 deficiency    • Vitamin D deficiency    • Wears glasses        Past Surgical History:   Procedure Laterality Date   • APPENDECTOMY     • CARDIAC CATHETERIZATION  2003?    \"IT WAS OK\"   • CHOLECYSTECTOMY WITH INTRAOPERATIVE CHOLANGIOGRAM N/A 5/15/2018    Procedure: CHOLECYSTECTOMY LAPAROSCOPIC INTRAOPERATIVE CHOLANGIOGRAM;  Surgeon: Eric Bran MD;  Location: [facility] OR;  Service: General   • COLONOSCOPY     • COLONOSCOPY N/A 8/8/2018    Procedure: COLONOSCOPY;  Surgeon: Eric Bran MD;  Location: [facility] ENDOSCOPY;  Service: Gastroenterology   • ENDOSCOPY     • HAND SURGERY Left  "   • KNEE ARTHROSCOPY Left    • MOUTH SURGERY      FULL MOUTH EXTRACTION       Family History   Problem Relation Age of Onset   • Diabetes Mother    • Diabetes Father    • Arthritis Other    • Gout Other    • Heart disease Other    • Lung disease Other        Social History   Substance Use Topics   • Smoking status: Never Smoker   • Smokeless tobacco: Never Used   • Alcohol use No        Allergies:  Allergies   Allergen Reactions   • Statins Myalgia       Medications:    Current Outpatient Prescriptions:   •  ACCU-CHEK SOFTCLIX LANCETS lancets, 1 each by Other route 3 (Three) Times a Day. Use as instructed, Disp: 300 each, Rfl: 3  •  albuterol (VENTOLIN HFA) 108 (90 BASE) MCG/ACT inhaler, Ventolin  (90 Base) MCG/ACT Inhalation Aerosol Solution; Patient Sig: Ventolin  (90 Base) MCG/ACT Inhalation Aerosol Solution INHALE 1 TO 2 PUFFS EVERY 4 TO 6 HOURS AS NEEDED.; 1; 11; 18-Aug-2015; Active, Disp: , Rfl:   •  Alcohol Swabs (B-D SINGLE USE SWABS REGULAR) pads, 1 swab 3 (three) times a day., Disp: 90 each, Rfl: 3  •  aspirin 81 MG EC tablet, Take 81 mg by mouth Daily., Disp: , Rfl:   •  bisacodyl (bisacodyl) 5 MG EC tablet, Take 1 tablet by mouth Daily., Disp: 4 tablet, Rfl: 0  •  Blood Gluc Meter Disp-Strips device, 1 strip 3 (Three) Times a Day. Patient needs Accu-chek meter and test strips., Disp: 1 each, Rfl: 0  •  Blood Glucose Calibration (SURECHEK CONTROL SOLUTION) NORMAL liquid, 1 bottle by In Vitro route every 30 (thirty) days., Disp: 3 each, Rfl: 3  •  carvedilol (COREG) 25 MG tablet, Take 1 tablet by mouth 2 (Two) Times a Day With Meals., Disp: 180 tablet, Rfl: 3  •  Cholecalciferol (VITAMIN D) 2000 UNITS tablet, Take 1 capsule by mouth 3 (Three) Times a Week., Disp: , Rfl:   •  Chromium-Cinnamon (CINNAMON PLUS CHROMIUM) 100-500 MCG-MG capsule, Take 2 capsules by mouth 2 (two) times a day., Disp: , Rfl:   •  finasteride (PROSCAR) 5 MG tablet, Take 1 tablet by mouth Daily., Disp: 90 tablet, Rfl:  "2  •  glimepiride (AMARYL) 4 MG tablet, Take 1 tablet by mouth Daily., Disp: 90 tablet, Rfl: 3  •  Glucosamine-Chondroit-Vit C-Mn (GLUCOSAMINE 1500 COMPLEX PO), Take 1 capsule by mouth 2 (Two) Times a Day., Disp: , Rfl:   •  glucose blood (ACCU-CHEK IDANIA) test strip, 1 each by Other route 3 (Three) Times a Day. Use as instructed, Disp: 300 each, Rfl: 3  •  lisinopril-hydrochlorothiazide (PRINZIDE,ZESTORETIC) 10-12.5 MG per tablet, Take 1 tablet by mouth Daily., Disp: 90 tablet, Rfl: 3  •  SITagliptin (JANUVIA) 100 MG tablet, Take 1 tablet by mouth Daily., Disp: 28 tablet, Rfl: 0    Objective     Vital Signs:   Vitals:    09/20/18 1442   BP: 122/80   Pulse: 82   Temp: 98 °F (36.7 °C)   SpO2: 99%   Weight: 99 kg (218 lb 3.2 oz)   Height: 182.9 cm (72\")       Physical Exam:   Heart:    Regular rhythm and normal rate,       General Appearance:    Alert, cooperative, in no acute distress   Skin:   The left side of his scalp he does have a scaly lesion likely recently biopsied.  I am unable to definitively tell where the second squamous cell cancer was removed from however.       Results Review:   I reviewed the patient's new clinical results.  Pathology was reviewed    Review of Systems was reviewed and confirmed as accurate today.    Assessment/Plan     1. Squamous cell skin cancer        Recommend excision of the squamous cell skin cancer.  The one with the eschar is identifiable.  The other one may be nearby just anteriorly but no residual lesion is noted there.  I'm not sure as to exactly where this second lesion was.  I've asked him to his dermatologist to Azam that one if possible the day before the excision.  Alternatively we can observe and see if a lesion grows back in that location.    Electronically signed by Eric Bran MD  09/20/18  3:32 PM          Portions of this note have been scribed for Eric Bran MD by Cathie Tilley CMA 9/20/2018  3:32 PM          "

## 2018-09-25 ENCOUNTER — PROCEDURE VISIT (OUTPATIENT)
Dept: SURGERY | Facility: CLINIC | Age: 73
End: 2018-09-25

## 2018-09-25 DIAGNOSIS — C44.90 SKIN CANCER: Primary | ICD-10-CM

## 2018-09-25 DIAGNOSIS — C44.42 SQUAMOUS CELL CARCINOMA OF SCALP: Primary | ICD-10-CM

## 2018-09-25 PROCEDURE — 11622 EXC S/N/H/F/G MAL+MRG 1.1-2: CPT | Performed by: SURGERY

## 2018-09-25 PROCEDURE — 11621 EXC S/N/H/F/G MAL+MRG 0.6-1: CPT | Performed by: SURGERY

## 2018-09-25 PROCEDURE — 12032 INTMD RPR S/A/T/EXT 2.6-7.5: CPT | Performed by: SURGERY

## 2018-09-25 NOTE — PROGRESS NOTES
Location:  scalp    Procedure:  Excisions to squamous cell carcinomas of the scalp.      I recommend excision. Procedure and the risks and benefits were explained including bleeding and infection. The patient understands these and wishes to proceed.  He understands that since I am unable to appreciate residual carcinoma is unclear exactly where the margin should be.  I may have to re-excise.    The patient was brought to the procedure room. Consent and time out were performed. The area was prepped and draped in the usual fashion. 1% lidocaine with epinephrine was infused locally. An ellyptical incision was made around the lesion. Full thickness excision was performed. The first lesion size was 1.2 cm. the second lesion was 1 cm.  The wound was closed in layers with interrupted simple vicryl and Nylon for the skin. Wound closure size was 1.5 cm. There were no complications and the patient tolerated the procedure well. Hemostasis was well controlled with pressure and there was minimal blood loss. Wound instructions were given.     The first lesion is the left anterior scalp and the skin was marked in the anterior position.  The second lesion was the left posterior and the lateral edge was marked for orientation.    Eric Bran MD

## 2018-10-02 ENCOUNTER — OFFICE VISIT (OUTPATIENT)
Dept: SURGERY | Facility: CLINIC | Age: 73
End: 2018-10-02

## 2018-10-02 VITALS
TEMPERATURE: 98.8 F | BODY MASS INDEX: 29.62 KG/M2 | SYSTOLIC BLOOD PRESSURE: 120 MMHG | HEART RATE: 77 BPM | HEIGHT: 72 IN | OXYGEN SATURATION: 98 % | DIASTOLIC BLOOD PRESSURE: 80 MMHG | WEIGHT: 218.7 LBS

## 2018-10-02 DIAGNOSIS — Z48.89 POSTOPERATIVE VISIT: Primary | ICD-10-CM

## 2018-10-02 PROCEDURE — 99024 POSTOP FOLLOW-UP VISIT: CPT | Performed by: SURGERY

## 2018-10-02 NOTE — PROGRESS NOTES
"Patient: Fazal Berg III    YOB: 1945    Date: 10/02/2018    Primary Care Provider: Matthew Carr MD    Reason for Consultation: Follow-up lesion excision    Chief Complaint   Patient presents with   • Post-op       History of present illness:  I saw the patient in the office today as a followup from their recent lesion excision @ left posterior scalp, the pathology report did show actinic keratosis actinic changes extend focally to one peripheral margin, anterior scalp excision showed actinic keratosis focally involving one peripheral margin.  They state that they have done well and are having no problems.      Vital Signs:  Vitals:    10/02/18 0857   BP: 120/80   Pulse: 77   Temp: 98.8 °F (37.1 °C)   TempSrc: Temporal Artery    SpO2: 98%   Weight: 99.2 kg (218 lb 11.2 oz)   Height: 182.9 cm (72\")       Physical Exam:   General Appearance:    Alert, cooperative, in no acute distress, wound clean dry without infection       Assessment / Plan:    1. Postoperative visit        No residual carcinoma found on the pathology from my 2 excisions from the left scalp.  I have urged him to follow up with his dermatologist in the next 3-6 months for recheck.    Electronically signed by Eric Bran MD  10/02/18        Portions of this note have been scribed for Eric Bran MD by Yu Carson. 10/2/2018  9:17 AM                "

## 2018-10-16 LAB
ALBUMIN SERPL-MCNC: 4 G/DL (ref 3.5–5)
ALBUMIN/GLOB SERPL: 1.5 G/DL (ref 1–2)
ALP SERPL-CCNC: 58 U/L (ref 38–126)
ALT SERPL-CCNC: 24 U/L (ref 13–69)
AST SERPL-CCNC: 22 U/L (ref 15–46)
BASOPHILS # BLD AUTO: 0.06 10*3/MM3 (ref 0–0.2)
BASOPHILS NFR BLD AUTO: 1.2 % (ref 0–2.5)
BILIRUB SERPL-MCNC: 0.8 MG/DL (ref 0.2–1.3)
BUN SERPL-MCNC: 20 MG/DL (ref 7–20)
BUN/CREAT SERPL: 16.7 (ref 6.3–21.9)
CALCIUM SERPL-MCNC: 10.2 MG/DL (ref 8.4–10.2)
CHLORIDE SERPL-SCNC: 105 MMOL/L (ref 98–107)
CHOLEST SERPL-MCNC: 172 MG/DL (ref 0–199)
CO2 SERPL-SCNC: 28 MMOL/L (ref 26–30)
CREAT SERPL-MCNC: 1.2 MG/DL (ref 0.6–1.3)
EOSINOPHIL # BLD AUTO: 0.17 10*3/MM3 (ref 0–0.7)
EOSINOPHIL NFR BLD AUTO: 3.4 % (ref 0–7)
ERYTHROCYTE [DISTWIDTH] IN BLOOD BY AUTOMATED COUNT: 12.5 % (ref 11.5–14.5)
GLOBULIN SER CALC-MCNC: 2.7 GM/DL
GLUCOSE SERPL-MCNC: 208 MG/DL (ref 74–98)
HBA1C MFR BLD: 7.8 %
HCT VFR BLD AUTO: 37.1 % (ref 42–52)
HDLC SERPL-MCNC: 53 MG/DL (ref 40–60)
HGB BLD-MCNC: 12.8 G/DL (ref 14–18)
IMM GRANULOCYTES # BLD: 0.02 10*3/MM3 (ref 0–0.06)
IMM GRANULOCYTES NFR BLD: 0.4 % (ref 0–0.6)
LDLC SERPL CALC-MCNC: 86 MG/DL (ref 0–99)
LYMPHOCYTES # BLD AUTO: 1.36 10*3/MM3 (ref 0.6–3.4)
LYMPHOCYTES NFR BLD AUTO: 27.2 % (ref 10–50)
MCH RBC QN AUTO: 31.3 PG (ref 27–31)
MCHC RBC AUTO-ENTMCNC: 34.5 G/DL (ref 30–37)
MCV RBC AUTO: 90.7 FL (ref 80–94)
MICROALBUMIN UR-MCNC: 10.5 UG/ML
MONOCYTES # BLD AUTO: 0.51 10*3/MM3 (ref 0–0.9)
MONOCYTES NFR BLD AUTO: 10.2 % (ref 0–12)
NEUTROPHILS # BLD AUTO: 2.88 10*3/MM3 (ref 2–6.9)
NEUTROPHILS NFR BLD AUTO: 57.6 % (ref 37–80)
NRBC BLD AUTO-RTO: 0 /100 WBC (ref 0–0)
PLATELET # BLD AUTO: 165 10*3/MM3 (ref 130–400)
POTASSIUM SERPL-SCNC: 4.6 MMOL/L (ref 3.5–5.1)
PROT SERPL-MCNC: 6.7 G/DL (ref 6.3–8.2)
RBC # BLD AUTO: 4.09 10*6/MM3 (ref 4.7–6.1)
SODIUM SERPL-SCNC: 137 MMOL/L (ref 137–145)
T4 FREE SERPL-MCNC: 1 NG/DL (ref 0.78–2.19)
TRIGL SERPL-MCNC: 166 MG/DL
TSH SERPL DL<=0.005 MIU/L-ACNC: 0.62 MIU/ML (ref 0.47–4.68)
VIT B12 SERPL-MCNC: 261 PG/ML (ref 239–931)
VLDLC SERPL CALC-MCNC: 33.2 MG/DL
WBC # BLD AUTO: 5 10*3/MM3 (ref 4.8–10.8)

## 2018-10-18 ENCOUNTER — OFFICE VISIT (OUTPATIENT)
Dept: INTERNAL MEDICINE | Facility: CLINIC | Age: 73
End: 2018-10-18

## 2018-10-18 VITALS
HEART RATE: 60 BPM | OXYGEN SATURATION: 99 % | WEIGHT: 220 LBS | SYSTOLIC BLOOD PRESSURE: 154 MMHG | RESPIRATION RATE: 16 BRPM | TEMPERATURE: 97.9 F | HEIGHT: 72 IN | DIASTOLIC BLOOD PRESSURE: 60 MMHG | BODY MASS INDEX: 29.8 KG/M2

## 2018-10-18 DIAGNOSIS — Z23 NEED FOR VACCINATION: ICD-10-CM

## 2018-10-18 DIAGNOSIS — D64.89 ANEMIA DUE TO OTHER CAUSE, NOT CLASSIFIED: ICD-10-CM

## 2018-10-18 DIAGNOSIS — E08.65 DIABETES MELLITUS DUE TO UNDERLYING CONDITION, UNCONTROLLED, WITH HYPERGLYCEMIA (HCC): Primary | ICD-10-CM

## 2018-10-18 DIAGNOSIS — N40.1 BENIGN NON-NODULAR PROSTATIC HYPERPLASIA WITH LOWER URINARY TRACT SYMPTOMS: ICD-10-CM

## 2018-10-18 DIAGNOSIS — I10 ESSENTIAL HYPERTENSION: ICD-10-CM

## 2018-10-18 PROCEDURE — 99214 OFFICE O/P EST MOD 30 MIN: CPT | Performed by: INTERNAL MEDICINE

## 2018-10-18 PROCEDURE — G0008 ADMIN INFLUENZA VIRUS VAC: HCPCS | Performed by: INTERNAL MEDICINE

## 2018-10-18 PROCEDURE — 90662 IIV NO PRSV INCREASED AG IM: CPT | Performed by: INTERNAL MEDICINE

## 2018-10-18 RX ORDER — FINASTERIDE 5 MG/1
5 TABLET, FILM COATED ORAL DAILY
Qty: 90 TABLET | Refills: 2 | Status: SHIPPED | OUTPATIENT
Start: 2018-10-18 | End: 2019-07-20 | Stop reason: SDUPTHER

## 2018-10-18 NOTE — PROGRESS NOTES
Subjective     Patient ID: Fazal Berg III is a 73 y.o. male. Patient is here for management of multiple medical problems.     Chief Complaint   Patient presents with   • Hypertension     4 month follow-up     Hypertension   This is a chronic problem. The current episode started more than 1 year ago. The problem has been waxing and waning since onset. The problem is uncontrolled. Pertinent negatives include no anxiety, blurred vision, chest pain or headaches. There are no associated agents to hypertension. Current antihypertension treatment includes ACE inhibitors and diuretics. The current treatment provides moderate improvement. There are no compliance problems.  There is no history of angina, kidney disease, CAD/MI, CVA, heart failure or left ventricular hypertrophy. There is no history of chronic renal disease, coarctation of the aorta, hyperaldosteronism or hypercortisolism.   Diabetes   He presents for his follow-up diabetic visit. He has type 2 diabetes mellitus. His disease course has been stable. Pertinent negatives for hypoglycemia include no confusion, dizziness or headaches. Pertinent negatives for diabetes include no blurred vision, no chest pain and no fatigue. Pertinent negatives for diabetic complications include no autonomic neuropathy, CVA or heart disease. Risk factors for coronary artery disease include diabetes mellitus, dyslipidemia, male sex, hypertension, stress and sedentary lifestyle. He is following a diabetic diet. He has had a previous visit with a dietitian. He participates in exercise daily. There is no change in his home blood glucose trend. An ACE inhibitor/angiotensin II receptor blocker is being taken.            The following portions of the patient's history were reviewed and updated as appropriate: allergies, current medications, past family history, past medical history, past social history, past surgical history and problem list.    Review of Systems   Constitutional:  Negative for fatigue.   Eyes: Negative for blurred vision.   Cardiovascular: Negative for chest pain.   Neurological: Negative for dizziness and headaches.   Psychiatric/Behavioral: Negative for confusion and sleep disturbance.   All other systems reviewed and are negative.      Current Outpatient Prescriptions:   •  ACCU-CHEK SOFTCLIX LANCETS lancets, 1 each by Other route 3 (Three) Times a Day. Use as instructed, Disp: 300 each, Rfl: 3  •  albuterol (VENTOLIN HFA) 108 (90 BASE) MCG/ACT inhaler, Ventolin  (90 Base) MCG/ACT Inhalation Aerosol Solution; Patient Sig: Ventolin  (90 Base) MCG/ACT Inhalation Aerosol Solution INHALE 1 TO 2 PUFFS EVERY 4 TO 6 HOURS AS NEEDED.; 1; 11; 18-Aug-2015; Active, Disp: , Rfl:   •  Alcohol Swabs (B-D SINGLE USE SWABS REGULAR) pads, 1 swab 3 (three) times a day., Disp: 90 each, Rfl: 3  •  aspirin 81 MG EC tablet, Take 81 mg by mouth Daily., Disp: , Rfl:   •  bisacodyl (bisacodyl) 5 MG EC tablet, Take 1 tablet by mouth Daily., Disp: 4 tablet, Rfl: 0  •  Blood Gluc Meter Disp-Strips device, 1 strip 3 (Three) Times a Day. Patient needs Accu-chek meter and test strips., Disp: 1 each, Rfl: 0  •  Blood Glucose Calibration (SURECHEK CONTROL SOLUTION) NORMAL liquid, 1 bottle by In Vitro route every 30 (thirty) days., Disp: 3 each, Rfl: 3  •  carvedilol (COREG) 25 MG tablet, Take 1 tablet by mouth 2 (Two) Times a Day With Meals., Disp: 180 tablet, Rfl: 3  •  Cholecalciferol (VITAMIN D) 2000 UNITS tablet, Take 1 capsule by mouth 3 (Three) Times a Week., Disp: , Rfl:   •  Chromium-Cinnamon (CINNAMON PLUS CHROMIUM) 100-500 MCG-MG capsule, Take 2 capsules by mouth 2 (two) times a day., Disp: , Rfl:   •  finasteride (PROSCAR) 5 MG tablet, Take 1 tablet by mouth Daily., Disp: 90 tablet, Rfl: 2  •  glimepiride (AMARYL) 4 MG tablet, Take 1 tablet by mouth Daily., Disp: 90 tablet, Rfl: 3  •  Glucosamine-Chondroit-Vit C-Mn (GLUCOSAMINE 1500 COMPLEX PO), Take 1 capsule by mouth 2 (Two)  "Times a Day., Disp: , Rfl:   •  glucose blood (ACCU-CHEK IDANIA) test strip, 1 each by Other route 3 (Three) Times a Day. Use as instructed, Disp: 300 each, Rfl: 3  •  lisinopril-hydrochlorothiazide (PRINZIDE,ZESTORETIC) 10-12.5 MG per tablet, Take 1 tablet by mouth Daily., Disp: 90 tablet, Rfl: 3  •  SITagliptin (JANUVIA) 100 MG tablet, Take 1 tablet by mouth Daily., Disp: 28 tablet, Rfl: 0    Objective      Blood pressure 154/60, pulse 60, temperature 97.9 °F (36.6 °C), temperature source Oral, resp. rate 16, height 182.9 cm (72\"), weight 99.8 kg (220 lb), SpO2 99 %.    Physical Exam     General Appearance:    Alert, cooperative, no distress, appears stated age   Head:    Normocephalic, without obvious abnormality, atraumatic   Eyes:    PERRL, conjunctiva/corneas clear, EOM's intact   Ears:    Normal TM's and external ear canals, both ears   Nose:   Nares normal, septum midline, mucosa normal, no drainage   or sinus tenderness   Throat:   Lips, mucosa, and tongue normal; teeth and gums normal   Neck:   Supple, symmetrical, trachea midline, no adenopathy;        thyroid:  No enlargement/tenderness/nodules; no carotid    bruit or JVD   Back:     Symmetric, no curvature, ROM normal, no CVA tenderness   Lungs:     Clear to auscultation bilaterally, respirations unlabored   Chest wall:    No tenderness or deformity   Heart:    Regular rate and rhythm, S1 and S2 normal, no murmur,        rub or gallop   Abdomen:     Soft, non-tender, bowel sounds active all four quadrants,     no masses, no organomegaly   Extremities:   Extremities normal, atraumatic, no cyanosis or edema   Pulses:   2+ and symmetric all extremities   Skin:   Skin color, texture, turgor normal, no rashes or lesions   Lymph nodes:   Cervical, supraclavicular, and axillary nodes normal   Neurologic:   CNII-XII intact. Normal strength, sensation and reflexes       throughout      Results for orders placed or performed during the hospital encounter of " 08/08/18   Tissue Pathology Exam   Result Value Ref Range    Case Report       Surgical Pathology Report                         Case: [id number]                                  Authorizing Provider:  Eric Bran MD      Collected:           08/08/2018 12:03 PM          Ordering Location:     [facility]    Received:            08/08/2018 02:30 PM                                 [facility]                                                                    Pathologist:           Navi Ragsdale DO                                                        Specimen:    Large Intestine, Left / Descending Colon                                                   Final Diagnosis       See Scanned Report             Assessment/Plan   Pt ha1c increased. Has been off metformin. Low bs in the past.  Pt will get back on metformin.   Pt will get back on vit B12    Anemia  Will recheck on b12  Just got back from [facility].          Fazal was seen today for hypertension.    Diagnoses and all orders for this visit:    Diabetes mellitus due to underlying condition, uncontrolled, with hyperglycemia (CMS/[facility])  -     TSH  -     Comprehensive Metabolic Panel  -     Vitamin B12  -     CBC & Differential  -     Lipid Panel  -     T4, Free  -     Hemoglobin A1c  -     MicroAlbumin, Urine, Random - Urine, Clean Catch    Anemia due to other cause, not classified  -     TSH  -     Comprehensive Metabolic Panel  -     Vitamin B12  -     CBC & Differential  -     Lipid Panel  -     T4, Free  -     Hemoglobin A1c  -     MicroAlbumin, Urine, Random - Urine, Clean Catch    Essential hypertension  -     Comprehensive Metabolic Panel      Return in about 3 months (around 1/18/2019).      pt will get back on metformin.     There are no Patient Instructions on file for this visit.     Matthew Carr MD    Assessment/Plan

## 2019-01-12 LAB
ALBUMIN SERPL-MCNC: 4.2 G/DL (ref 3.5–5)
ALBUMIN/GLOB SERPL: 1.6 G/DL (ref 1–2)
ALP SERPL-CCNC: 57 U/L (ref 38–126)
ALT SERPL-CCNC: 36 U/L (ref 13–69)
AST SERPL-CCNC: 22 U/L (ref 15–46)
BASOPHILS # BLD AUTO: 0.05 10*3/MM3 (ref 0–0.2)
BASOPHILS NFR BLD AUTO: 0.8 % (ref 0–2.5)
BILIRUB SERPL-MCNC: 0.9 MG/DL (ref 0.2–1.3)
BUN SERPL-MCNC: 26 MG/DL (ref 7–20)
BUN/CREAT SERPL: 21.7 (ref 6.3–21.9)
CALCIUM SERPL-MCNC: 10 MG/DL (ref 8.4–10.2)
CHLORIDE SERPL-SCNC: 99 MMOL/L (ref 98–107)
CHOLEST SERPL-MCNC: 165 MG/DL (ref 0–199)
CO2 SERPL-SCNC: 29 MMOL/L (ref 26–30)
CREAT SERPL-MCNC: 1.2 MG/DL (ref 0.6–1.3)
EOSINOPHIL # BLD AUTO: 0.2 10*3/MM3 (ref 0–0.7)
EOSINOPHIL NFR BLD AUTO: 3.4 % (ref 0–7)
ERYTHROCYTE [DISTWIDTH] IN BLOOD BY AUTOMATED COUNT: 11.8 % (ref 11.5–14.5)
GLOBULIN SER CALC-MCNC: 2.7 GM/DL
GLUCOSE SERPL-MCNC: 173 MG/DL (ref 74–98)
HBA1C MFR BLD: 7.2 %
HCT VFR BLD AUTO: 37.3 % (ref 42–52)
HDLC SERPL-MCNC: 46 MG/DL (ref 40–60)
HGB BLD-MCNC: 12.8 G/DL (ref 14–18)
IMM GRANULOCYTES # BLD AUTO: 0.04 10*3/MM3 (ref 0–0.06)
IMM GRANULOCYTES NFR BLD AUTO: 0.7 % (ref 0–0.6)
LDLC SERPL CALC-MCNC: 92 MG/DL (ref 0–99)
LYMPHOCYTES # BLD AUTO: 1.3 10*3/MM3 (ref 0.6–3.4)
LYMPHOCYTES NFR BLD AUTO: 21.9 % (ref 10–50)
MCH RBC QN AUTO: 32 PG (ref 27–31)
MCHC RBC AUTO-ENTMCNC: 34.3 G/DL (ref 30–37)
MCV RBC AUTO: 93.3 FL (ref 80–94)
MICROALBUMIN UR-MCNC: 4 UG/ML
MONOCYTES # BLD AUTO: 0.99 10*3/MM3 (ref 0–0.9)
MONOCYTES NFR BLD AUTO: 16.7 % (ref 0–12)
NEUTROPHILS # BLD AUTO: 3.35 10*3/MM3 (ref 2–6.9)
NEUTROPHILS NFR BLD AUTO: 56.5 % (ref 37–80)
NRBC BLD AUTO-RTO: 0 /100 WBC (ref 0–0)
PLATELET # BLD AUTO: 179 10*3/MM3 (ref 130–400)
POTASSIUM SERPL-SCNC: 4.9 MMOL/L (ref 3.5–5.1)
PROT SERPL-MCNC: 6.9 G/DL (ref 6.3–8.2)
RBC # BLD AUTO: 4 10*6/MM3 (ref 4.7–6.1)
SODIUM SERPL-SCNC: 135 MMOL/L (ref 137–145)
T4 FREE SERPL-MCNC: 1.17 NG/DL (ref 0.78–2.19)
TRIGL SERPL-MCNC: 134 MG/DL
TSH SERPL DL<=0.005 MIU/L-ACNC: 1.38 MIU/ML (ref 0.47–4.68)
VIT B12 SERPL-MCNC: >1000 PG/ML (ref 239–931)
VLDLC SERPL CALC-MCNC: 26.8 MG/DL
WBC # BLD AUTO: 5.93 10*3/MM3 (ref 4.8–10.8)

## 2019-01-18 ENCOUNTER — OFFICE VISIT (OUTPATIENT)
Dept: INTERNAL MEDICINE | Facility: CLINIC | Age: 74
End: 2019-01-18

## 2019-01-18 VITALS
WEIGHT: 222 LBS | BODY MASS INDEX: 30.07 KG/M2 | TEMPERATURE: 98 F | HEART RATE: 64 BPM | DIASTOLIC BLOOD PRESSURE: 63 MMHG | SYSTOLIC BLOOD PRESSURE: 141 MMHG | OXYGEN SATURATION: 99 % | RESPIRATION RATE: 16 BRPM | HEIGHT: 72 IN

## 2019-01-18 DIAGNOSIS — I10 ESSENTIAL HYPERTENSION: ICD-10-CM

## 2019-01-18 DIAGNOSIS — E78.5 HYPERLIPIDEMIA, UNSPECIFIED HYPERLIPIDEMIA TYPE: ICD-10-CM

## 2019-01-18 DIAGNOSIS — E11.42 TYPE 2 DIABETES, CONTROLLED, WITH PERIPHERAL NEUROPATHY (HCC): Primary | ICD-10-CM

## 2019-01-18 DIAGNOSIS — D64.89 ANEMIA DUE TO OTHER CAUSE, NOT CLASSIFIED: ICD-10-CM

## 2019-01-18 PROCEDURE — 99214 OFFICE O/P EST MOD 30 MIN: CPT | Performed by: INTERNAL MEDICINE

## 2019-01-18 RX ORDER — TELMISARTAN AND HYDROCHLORTHIAZIDE 40; 12.5 MG/1; MG/1
1 TABLET ORAL 2 TIMES DAILY
Qty: 180 TABLET | Refills: 3 | Status: SHIPPED | OUTPATIENT
Start: 2019-01-18 | End: 2019-04-23

## 2019-01-18 NOTE — PROGRESS NOTES
Subjective     Patient ID: Fazal Berg III is a 73 y.o. male. Patient is here for management of multiple medical problems.     Chief Complaint   Patient presents with   • Diabetes     3 month follow-up   • Hypertension     3 month follow-up     Diabetes   He presents for his follow-up diabetic visit. He has type 2 diabetes mellitus. His disease course has been stable. Pertinent negatives for hypoglycemia include no confusion, headaches or hunger. Pertinent negatives for diabetes include no blurred vision, no fatigue and no foot paresthesias. Pertinent negatives for hypoglycemia complications include no blackouts and no hospitalization. Symptoms are stable. Pertinent negatives for diabetic complications include no autonomic neuropathy, CVA or heart disease. His weight is stable. He is following a diabetic diet. He participates in exercise every other day. There is no change in his home blood glucose trend. An ACE inhibitor/angiotensin II receptor blocker is being taken. Eye exam is current.   Hypertension   This is a chronic problem. The current episode started today. The problem is controlled. Pertinent negatives include no blurred vision or headaches. Risk factors for coronary artery disease include diabetes mellitus, dyslipidemia, obesity and male gender. Past treatments include ACE inhibitors and diuretics. There is no history of CVA.    missed bp med last night.              The following portions of the patient's history were reviewed and updated as appropriate: allergies, current medications, past family history, past medical history, past social history, past surgical history and problem list.    Review of Systems   Constitutional: Negative for diaphoresis and fatigue.   Eyes: Negative for blurred vision.   Gastrointestinal: Negative for abdominal distention, abdominal pain, blood in stool and constipation.   Neurological: Negative for headaches.   Psychiatric/Behavioral: Negative for confusion.   All  other systems reviewed and are negative.      Current Outpatient Medications:   •  ACCU-CHEK SOFTCLIX LANCETS lancets, 1 each by Other route 3 (Three) Times a Day. Use as instructed, Disp: 300 each, Rfl: 3  •  albuterol (VENTOLIN HFA) 108 (90 BASE) MCG/ACT inhaler, Ventolin  (90 Base) MCG/ACT Inhalation Aerosol Solution; Patient Sig: Ventolin  (90 Base) MCG/ACT Inhalation Aerosol Solution INHALE 1 TO 2 PUFFS EVERY 4 TO 6 HOURS AS NEEDED.; 1; 11; 18-Aug-2015; Active, Disp: , Rfl:   •  Alcohol Swabs (B-D SINGLE USE SWABS REGULAR) pads, 1 swab 3 (three) times a day., Disp: 90 each, Rfl: 3  •  aspirin 81 MG EC tablet, Take 81 mg by mouth Daily., Disp: , Rfl:   •  bisacodyl (bisacodyl) 5 MG EC tablet, Take 1 tablet by mouth Daily., Disp: 4 tablet, Rfl: 0  •  Blood Gluc Meter Disp-Strips device, 1 strip 3 (Three) Times a Day. Patient needs Accu-chek meter and test strips., Disp: 1 each, Rfl: 0  •  Blood Glucose Calibration (SURECHEK CONTROL SOLUTION) NORMAL liquid, 1 bottle by In Vitro route every 30 (thirty) days., Disp: 3 each, Rfl: 3  •  carvedilol (COREG) 25 MG tablet, Take 1 tablet by mouth 2 (Two) Times a Day With Meals., Disp: 180 tablet, Rfl: 3  •  Cholecalciferol (VITAMIN D) 2000 UNITS tablet, Take 1 capsule by mouth 3 (Three) Times a Week., Disp: , Rfl:   •  Chromium-Cinnamon (CINNAMON PLUS CHROMIUM) 100-500 MCG-MG capsule, Take 2 capsules by mouth 2 (two) times a day., Disp: , Rfl:   •  finasteride (PROSCAR) 5 MG tablet, Take 1 tablet by mouth Daily., Disp: 90 tablet, Rfl: 2  •  glimepiride (AMARYL) 4 MG tablet, Take 1 tablet by mouth Daily., Disp: 90 tablet, Rfl: 3  •  Glucosamine-Chondroit-Vit C-Mn (GLUCOSAMINE 1500 COMPLEX PO), Take 1 capsule by mouth 2 (Two) Times a Day., Disp: , Rfl:   •  glucose blood (ACCU-CHEK IDANIA) test strip, 1 each by Other route 3 (Three) Times a Day. Use as instructed, Disp: 300 each, Rfl: 3  •  SITagliptin (JANUVIA) 100 MG tablet, Take 1 tablet by mouth Daily.,  "Disp: 28 tablet, Rfl: 0  •  telmisartan-hydrochlorothiazide (MICARDIS HCT) 40-12.5 MG per tablet, Take 1 tablet by mouth 2 (Two) Times a Day., Disp: 180 tablet, Rfl: 3    Objective      Blood pressure 141/63, pulse 64, temperature 98 °F (36.7 °C), temperature source Oral, resp. rate 16, height 182.9 cm (72\"), weight 101 kg (222 lb), SpO2 99 %.    Physical Exam    Fazal had a diabetic foot exam performed today.   During the foot exam he had a monofilament test performed.    Neurological Sensory Findings - Unaltered hot/cold right ankle/foot discrimination and unaltered hot/cold left ankle/foot discrimination. Unaltered sharp/dull right ankle/foot discrimination and unaltered sharp/dull left ankle/foot discrimination. No right ankle/foot altered proprioception and no left ankle/foot altered proprioception  Vascular Status -  His right foot exhibits normal foot vasculature  and no edema. His left foot exhibits normal foot vasculature  and no edema.  Skin Integrity  -  His right foot skin is intact. He has right foot onychomycosis.  He has non-callous right foot.His left foot skin is intact.He has left foot onychomycosis. He has non-callous left foot..   Foot Structure and Biomechanics -  His right foot has no hallux valgus and no pes cavus present. His left foot has no hallux valgus and no pes cavus.       General Appearance:    Alert, cooperative, no distress, appears stated age   Head:    Normocephalic, without obvious abnormality, atraumatic   Eyes:    PERRL, conjunctiva/corneas clear, EOM's intact   Ears:    Normal TM's and external ear canals, both ears   Nose:   Nares normal, septum midline, mucosa normal, no drainage   or sinus tenderness   Throat:   Lips, mucosa, and tongue normal; teeth and gums normal   Neck:   Supple, symmetrical, trachea midline, no adenopathy;        thyroid:  No enlargement/tenderness/nodules; no carotid    bruit or JVD   Back:     Symmetric, no curvature, ROM normal, no CVA tenderness "   Lungs:     Clear to auscultation bilaterally, respirations unlabored   Chest wall:    No tenderness or deformity   Heart:    Regular rate and rhythm, S1 and S2 normal, no murmur,        rub or gallop   Abdomen:     Soft, non-tender, bowel sounds active all four quadrants,     no masses, no organomegaly   Extremities:   Extremities normal, atraumatic, no cyanosis or edema   Pulses:   2+ and symmetric all extremities   Skin:   Skin color, texture, turgor normal, no rashes or lesions   Lymph nodes:   Cervical, supraclavicular, and axillary nodes normal   Neurologic:   CNII-XII intact. Normal strength, sensation and reflexes       throughout      Results for orders placed or performed in visit on 10/18/18   TSH   Result Value Ref Range    TSH 1.380 0.470 - 4.680 mIU/mL   Comprehensive Metabolic Panel   Result Value Ref Range    Glucose 173 (H) 74 - 98 mg/dL    BUN 26 (H) 7 - 20 mg/dL    Creatinine 1.20 0.60 - 1.30 mg/dL    eGFR Non African Am 60 (L) >60 mL/min/1.73    eGFR African Am 72 >60 mL/min/1.73    BUN/Creatinine Ratio 21.7 6.3 - 21.9    Sodium 135 (L) 137 - 145 mmol/L    Potassium 4.9 3.5 - 5.1 mmol/L    Chloride 99 98 - 107 mmol/L    Total CO2 29.0 26.0 - 30.0 mmol/L    Calcium 10.0 8.4 - 10.2 mg/dL    Total Protein 6.9 6.3 - 8.2 g/dL    Albumin 4.20 3.50 - 5.00 g/dL    Globulin 2.7 gm/dL    A/G Ratio 1.6 1.0 - 2.0 g/dL    Total Bilirubin 0.9 0.2 - 1.3 mg/dL    Alkaline Phosphatase 57 38 - 126 U/L    AST (SGOT) 22 15 - 46 U/L    ALT (SGPT) 36 13 - 69 U/L   Vitamin B12   Result Value Ref Range    Vitamin B-12 >1000 (H) 239 - 931 pg/mL   Lipid Panel   Result Value Ref Range    Total Cholesterol 165 0 - 199 mg/dL    Triglycerides 134 <150 mg/dL    HDL Cholesterol 46 40 - 60 mg/dL    VLDL Cholesterol 26.8 mg/dL    LDL Cholesterol  92 0 - 99 mg/dL   T4, Free   Result Value Ref Range    Free T4 1.17 0.78 - 2.19 ng/dL   Hemoglobin A1c   Result Value Ref Range    Hemoglobin A1C 7.20 %   MicroAlbumin, Urine, Random -  Urine, Clean Catch   Result Value Ref Range    Microalbumin, Urine 4.0 Not Estab. ug/mL   CBC & Differential   Result Value Ref Range    WBC 5.93 4.80 - 10.80 10*3/mm3    RBC 4.00 (L) 4.70 - 6.10 10*6/mm3    Hemoglobin 12.8 (L) 14.0 - 18.0 g/dL    Hematocrit 37.3 (L) 42.0 - 52.0 %    MCV 93.3 80.0 - 94.0 fL    MCH 32.0 (H) 27.0 - 31.0 pg    MCHC 34.3 30.0 - 37.0 g/dL    RDW 11.8 11.5 - 14.5 %    Platelets 179 130 - 400 10*3/mm3    Neutrophil Rel % 56.5 37.0 - 80.0 %    Lymphocyte Rel % 21.9 10.0 - 50.0 %    Monocyte Rel % 16.7 (H) 0.0 - 12.0 %    Eosinophil Rel % 3.4 0.0 - 7.0 %    Basophil Rel % 0.8 0.0 - 2.5 %    Neutrophils Absolute 3.35 2.00 - 6.90 10*3/mm3    Lymphocytes Absolute 1.30 0.60 - 3.40 10*3/mm3    Monocytes Absolute 0.99 (H) 0.00 - 0.90 10*3/mm3    Eosinophils Absolute 0.20 0.00 - 0.70 10*3/mm3    Basophils Absolute 0.05 0.00 - 0.20 10*3/mm3    Immature Granulocyte Rel % 0.7 (H) 0.0 - 0.6 %    Immature Grans Absolute 0.04 0.00 - 0.06 10*3/mm3    nRBC 0.0 0.0 - 0.0 /100 WBC         Assessment/Plan     ha1c improved.    Anemia stable.    Fazal was seen today for diabetes and hypertension.    Diagnoses and all orders for this visit:    Type 2 diabetes, controlled, with peripheral neuropathy (CMS/HCC)  -     Lipid Panel  -     CBC & Differential  -     Vitamin B12  -     TSH  -     Comprehensive Metabolic Panel  -     T4, Free    Essential hypertension  -     telmisartan-hydrochlorothiazide (MICARDIS HCT) 40-12.5 MG per tablet; Take 1 tablet by mouth 2 (Two) Times a Day.  -     Lipid Panel  -     CBC & Differential  -     Vitamin B12  -     TSH  -     Comprehensive Metabolic Panel  -     T4, Free    Hyperlipidemia, unspecified hyperlipidemia type  -     Lipid Panel  -     CBC & Differential  -     Vitamin B12  -     TSH  -     Comprehensive Metabolic Panel  -     T4, Free    Anemia due to other cause, not classified  -     Lipid Panel  -     CBC & Differential  -     Vitamin B12  -     TSH  -      Comprehensive Metabolic Panel  -     T4, Free      Return in about 3 months (around 4/18/2019).          There are no Patient Instructions on file for this visit.     Matthew Carr MD    Assessment/Plan

## 2019-01-25 ENCOUNTER — PRIOR AUTHORIZATION (OUTPATIENT)
Dept: INTERNAL MEDICINE | Facility: CLINIC | Age: 74
End: 2019-01-25

## 2019-02-05 ENCOUNTER — OFFICE VISIT (OUTPATIENT)
Dept: INTERNAL MEDICINE | Facility: CLINIC | Age: 74
End: 2019-02-05

## 2019-02-05 VITALS
TEMPERATURE: 99.1 F | RESPIRATION RATE: 16 BRPM | BODY MASS INDEX: 30.08 KG/M2 | SYSTOLIC BLOOD PRESSURE: 120 MMHG | HEART RATE: 60 BPM | OXYGEN SATURATION: 97 % | DIASTOLIC BLOOD PRESSURE: 78 MMHG | WEIGHT: 222.12 LBS | HEIGHT: 72 IN

## 2019-02-05 DIAGNOSIS — J98.8 RESPIRATORY INFECTION: Primary | ICD-10-CM

## 2019-02-05 DIAGNOSIS — E11.42 TYPE 2 DIABETES, CONTROLLED, WITH PERIPHERAL NEUROPATHY (HCC): ICD-10-CM

## 2019-02-05 DIAGNOSIS — Z20.828 EXPOSURE TO THE FLU: ICD-10-CM

## 2019-02-05 LAB
EXPIRATION DATE: NORMAL
FLUAV AG NPH QL: NEGATIVE
FLUBV AG NPH QL: NEGATIVE
INTERNAL CONTROL: NORMAL
Lab: NORMAL

## 2019-02-05 PROCEDURE — 87804 INFLUENZA ASSAY W/OPTIC: CPT | Performed by: PHYSICIAN ASSISTANT

## 2019-02-05 PROCEDURE — 99213 OFFICE O/P EST LOW 20 MIN: CPT | Performed by: PHYSICIAN ASSISTANT

## 2019-02-05 RX ORDER — AMOXICILLIN AND CLAVULANATE POTASSIUM 875; 125 MG/1; MG/1
1 TABLET, FILM COATED ORAL 2 TIMES DAILY
Qty: 14 TABLET | Refills: 0 | Status: SHIPPED | OUTPATIENT
Start: 2019-02-05 | End: 2019-02-05

## 2019-02-05 RX ORDER — AMOXICILLIN AND CLAVULANATE POTASSIUM 875; 125 MG/1; MG/1
1 TABLET, FILM COATED ORAL 2 TIMES DAILY
Qty: 14 TABLET | Refills: 0 | Status: SHIPPED | OUTPATIENT
Start: 2019-02-05 | End: 2019-02-12

## 2019-02-05 NOTE — PROGRESS NOTES
"Chief Complaint   Patient presents with   • Fever     off and on since last thursday, 1/31/19. C/O body aches and headache as well.    • Medication Problem     Pt also is needing samples of januvia but we are out        Subjective   Fazal Berg III is a 73 y.o. male    History of Present Illness     Fever:  Fever began last Thursday, T-max of 101.8.  There has been associated symptoms of congestion and cough.  The cough is productive most mornings.  He denies any shortness of breath or chest pain.  He has been using Flonase and alkazelter cold and flu.  These help with his symptoms for a short period of time, then fever will return.  Both of his grand daughters were diagnosed with flu about 1 week ago.  He reports he is current with pneumonia and flu vaccination.    Past Medical History:   Diagnosis Date   • Acute asthma exacerbation    • Acute sinusitis    • Acute URI    • Allergic rhinitis    • Anemia    • Arthritis    • Asthma    • Benign prostatic hypertrophy    • Bronchitis    • Chest pain     \"I'VE SEEN DR. CARRASCO FOR IT A LONG TIME AGO, AND THAT'S WHEN HE DID TESTING\".  STATES HAS NOT MENTIONED IT TO DR. KHAN.  STATES UNSURE OF WHEN LAST TIME WAS\".  PT STATES \"I'M NOT A COMPLAINER\".  STATES COLD WEATHER AND WALKING BRING IT ON.  GETS SHORTNESS OF BREATH OCCASSIONALLY.  MORE DESCRIBED AS PRESSURE.    • ED (erectile dysfunction)    • Elevated cholesterol    • Family history of deafness and hearing loss    • Full dentures     INSTRUCTED NO ADHESIVES THE DOS   • GERD (gastroesophageal reflux disease)    • H/O echocardiogram 2003   • Hiatal hernia    • History of fracture     REPORTS TOE ON RIGHT FOOT - NO SURGICAL INTERVENTION REQUIRED   • [place] (hard of hearing)     SPOUSE REPORTS NO USE OF HEARING AIDS   • Hx of exercise stress test     SPOUSE REPORTS LAST DONE BY DR ALMANZA AROUND JUNE 2018 AND REPORTED ALL WNL'S AND MD RELEASED PATIENT   • Hyperlipidemia    • Hypertension    • Jaundice     AS A TEEN    • " "Obstructive sleep apnea     NO CPAP - SPOUSE REPORTS \"HE WOULDN'T DO IT\"   • Peripheral neuropathy    • Pneumonia     SPOUSE REPORTS HISTORY   • Prostate cancer (CMS/HCC)    • Short of breath on exertion    • Skin cancer    • Type 2 diabetes mellitus (CMS/HCC)    • Urinary frequency    • Vitamin B 12 deficiency    • Vitamin D deficiency    • Wears glasses      Past Surgical History:   Procedure Laterality Date   • APPENDECTOMY     • CARDIAC CATHETERIZATION  2003?    \"IT WAS OK\"   • CHOLECYSTECTOMY  06/2018   • CHOLECYSTECTOMY WITH INTRAOPERATIVE CHOLANGIOGRAM N/A 5/15/2018    Procedure: CHOLECYSTECTOMY LAPAROSCOPIC INTRAOPERATIVE CHOLANGIOGRAM;  Surgeon: Eric Bran MD;  Location: [facility] OR;  Service: General   • COLONOSCOPY     • COLONOSCOPY N/A 8/8/2018    Procedure: COLONOSCOPY;  Surgeon: Eric Bran MD;  Location: [facility] ENDOSCOPY;  Service: Gastroenterology   • ENDOSCOPY     • HAND SURGERY Left    • KNEE ARTHROSCOPY Left    • MOUTH SURGERY      FULL MOUTH EXTRACTION     Family History   Problem Relation Age of Onset   • Diabetes Mother    • Diabetes Father    • Arthritis Other    • Gout Other    • Heart disease Other    • Lung disease Other      Social History     Socioeconomic History   • Marital status:      Spouse name: Not on file   • Number of children: Not on file   • Years of education: Not on file   • Highest education level: Not on file   Social Needs   • Financial resource strain: Not on file   • Food insecurity - worry: Not on file   • Food insecurity - inability: Not on file   • Transportation needs - medical: Not on file   • Transportation needs - non-medical: Not on file   Occupational History     Employer: RETIRED   Tobacco Use   • Smoking status: Never Smoker   • Smokeless tobacco: Never Used   Substance and Sexual Activity   • Alcohol use: No   • Drug use: No   • Sexual activity: Defer   Other Topics Concern   • Not on file   Social History Narrative   • Not on file "     Allergies   Allergen Reactions   • Statins Myalgia         Review of Systems   Constitutional: Positive for fatigue and fever. Negative for activity change, appetite change and chills.   HENT: Positive for congestion, rhinorrhea and sore throat. Negative for trouble swallowing and voice change.    Respiratory: Positive for cough. Negative for chest tightness and shortness of breath.    Cardiovascular: Negative for chest pain and palpitations.   Gastrointestinal: Negative for constipation, diarrhea, nausea and vomiting.   Neurological: Negative for light-headedness and headache.     Objective     Vitals:    02/05/19 1125   BP: 120/78   Pulse: 60   Resp: 16   Temp: 99.1 °F (37.3 °C)   SpO2: 97%       Physical Exam   Constitutional: He is oriented to person, place, and time. He appears well-developed and well-nourished. No distress.   HENT:   Head: Normocephalic and atraumatic.   Right Ear: Tympanic membrane, external ear and ear canal normal.   Left Ear: Tympanic membrane, external ear and ear canal normal.   Nose: Congestion present. No sinus tenderness.   Mouth/Throat: Uvula is midline and mucous membranes are normal. Posterior oropharyngeal erythema present.   Mild erythema to the posterior oropharynx.    Eyes: Conjunctivae and EOM are normal. Pupils are equal, round, and reactive to light.   Neck: Normal range of motion. Neck supple.   Cardiovascular: Normal rate, regular rhythm and normal heart sounds. Exam reveals no gallop and no friction rub.   No murmur heard.  Pulmonary/Chest: Effort normal and breath sounds normal. No respiratory distress. He has no wheezes. He has no rales.   Neurological: He is alert and oriented to person, place, and time.   Skin: Skin is warm and dry. Capillary refill takes less than 2 seconds. He is not diaphoretic.   Psychiatric: He has a normal mood and affect. His behavior is normal.   Nursing note and vitals reviewed.    Results for orders placed or performed in visit on  02/05/19   POC Influenza A / B   Result Value Ref Range    Rapid Influenza A Ag Negative Negative    Rapid Influenza B Ag Negative Negative    Internal Control Passed Passed    Lot Number [id number]     Expiration Date 09/21/2021        Assessment/Plan     Fazal was seen today for fever and medication problem.    Diagnoses and all orders for this visit:    Respiratory infection  -     Rapid flu negative.  Start Augmentin, push fluids, continue Flonase.  Tylenol as needed for fever.  RTC if fever is not resolving.  Consider obtaining baseline labs.  Patient verbalizes understanding and is agreeable with plan.  Continue regular follow up with Dr. Carr.   -     amoxicillin-clavulanate (AUGMENTIN) 875-125 MG per tablet; Take 1 tablet by mouth 2 (Two) Times a Day for 7 days.    Exposure to the flu  -     POC Influenza A / B    Type 2 diabetes, controlled, with peripheral neuropathy (CMS/HCC)  -     Refilled Januvia today in clinic and provided patient with coupon card.  No samples were available in office today.   -     SITagliptin (JANUVIA) 100 MG tablet; Take 1 tablet by mouth Daily.    Return if symptoms worsen or fail to improve, for Next scheduled follow up.    Kemi Dumas PA-C

## 2019-02-08 ENCOUNTER — OFFICE VISIT (OUTPATIENT)
Dept: UROLOGY | Facility: CLINIC | Age: 74
End: 2019-02-08

## 2019-02-08 VITALS
HEART RATE: 65 BPM | TEMPERATURE: 98.2 F | SYSTOLIC BLOOD PRESSURE: 134 MMHG | DIASTOLIC BLOOD PRESSURE: 76 MMHG | OXYGEN SATURATION: 96 %

## 2019-02-08 DIAGNOSIS — C61 PROSTATE CANCER (HCC): Primary | ICD-10-CM

## 2019-02-08 LAB
BILIRUB BLD-MCNC: NEGATIVE MG/DL
CLARITY, POC: CLEAR
COLOR UR: YELLOW
GLUCOSE UR STRIP-MCNC: NEGATIVE MG/DL
KETONES UR QL: NEGATIVE
LEUKOCYTE EST, POC: NEGATIVE
NITRITE UR-MCNC: NEGATIVE MG/ML
PH UR: 6 [PH] (ref 5–8)
PROT UR STRIP-MCNC: NEGATIVE MG/DL
PSA SERPL-MCNC: 1.45 NG/ML (ref 0.06–4)
RBC # UR STRIP: NEGATIVE /UL
SP GR UR: 1.02 (ref 1–1.03)
UROBILINOGEN UR QL: NORMAL

## 2019-02-08 PROCEDURE — 81003 URINALYSIS AUTO W/O SCOPE: CPT | Performed by: UROLOGY

## 2019-02-08 PROCEDURE — 99213 OFFICE O/P EST LOW 20 MIN: CPT | Performed by: UROLOGY

## 2019-02-08 RX ORDER — HEPATITIS A VACCINE 1440 [IU]/ML
INJECTION, SUSPENSION INTRAMUSCULAR
Refills: 0 | COMMUNITY
Start: 2018-12-11 | End: 2019-04-23

## 2019-02-08 RX ORDER — ZOSTER VACCINE RECOMBINANT, ADJUVANTED 50 MCG/0.5
KIT INTRAMUSCULAR
Refills: 1 | COMMUNITY
Start: 2018-12-11 | End: 2019-04-23

## 2019-02-08 NOTE — PROGRESS NOTES
"Chief Complaint  Prostate Cancer (6 months fup.)        TIFFANY Berg is a 73 y.o. male who returns today for follow-up with a history of prostate cancer found on biopsy but since it was low-grade and early stage is being managed on a program of active surveillance.  With a PSA near 10 at the time of diagnosis he was placed on Proscar to shrink the enlarged gland and it is been bound down to normal ever since.  I suggested at anytime the cancer seems to be progressing that we repeat the biopsy or just consider proceeding with CyberKnife therapy.  He continues to deny any difficulty voiding on Proscar.    Vitals:    02/08/19 1019   BP: 134/76   Pulse: 65   Temp: 98.2 °F (36.8 °C)   SpO2: 96%       Past Medical History  Past Medical History:   Diagnosis Date   • Acute asthma exacerbation    • Acute sinusitis    • Acute URI    • Allergic rhinitis    • Anemia    • Arthritis    • Asthma    • Benign prostatic hypertrophy    • Bronchitis    • Chest pain     \"I'VE SEEN DR. CARRASCO FOR IT A LONG TIME AGO, AND THAT'S WHEN HE DID TESTING\".  STATES HAS NOT MENTIONED IT TO DR. KHAN.  STATES UNSURE OF WHEN LAST TIME WAS\".  PT STATES \"I'M NOT A COMPLAINER\".  STATES COLD WEATHER AND WALKING BRING IT ON.  GETS SHORTNESS OF BREATH OCCASSIONALLY.  MORE DESCRIBED AS PRESSURE.    • ED (erectile dysfunction)    • Elevated cholesterol    • Family history of deafness and hearing loss    • Full dentures     INSTRUCTED NO ADHESIVES THE DOS   • GERD (gastroesophageal reflux disease)    • H/O echocardiogram 2003   • Hiatal hernia    • History of fracture     REPORTS TOE ON RIGHT FOOT - NO SURGICAL INTERVENTION REQUIRED   • [place] (hard of hearing)     SPOUSE REPORTS NO USE OF HEARING AIDS   • Hx of exercise stress test     SPOUSE REPORTS LAST DONE BY DR ALMANZA AROUND JUNE 2018 AND REPORTED ALL WNL'S AND MD RELEASED PATIENT   • Hyperlipidemia    • Hypertension    • Jaundice     AS A TEEN    • Obstructive sleep apnea     NO CPAP - SPOUSE REPORTS " "\"HE WOULDN'T DO IT\"   • Peripheral neuropathy    • Pneumonia     SPOUSE REPORTS HISTORY   • Prostate cancer (CMS/HCC)    • Short of breath on exertion    • Skin cancer    • Type 2 diabetes mellitus (CMS/HCC)    • Urinary frequency    • Vitamin B 12 deficiency    • Vitamin D deficiency    • Wears glasses        Past Surgical History  Past Surgical History:   Procedure Laterality Date   • APPENDECTOMY     • CARDIAC CATHETERIZATION  2003?    \"IT WAS OK\"   • CHOLECYSTECTOMY  06/2018   • CHOLECYSTECTOMY WITH INTRAOPERATIVE CHOLANGIOGRAM N/A 5/15/2018    Procedure: CHOLECYSTECTOMY LAPAROSCOPIC INTRAOPERATIVE CHOLANGIOGRAM;  Surgeon: Eric Bran MD;  Location: [facility] OR;  Service: General   • COLONOSCOPY     • COLONOSCOPY N/A 8/8/2018    Procedure: COLONOSCOPY;  Surgeon: Eric Bran MD;  Location: [facility] ENDOSCOPY;  Service: Gastroenterology   • ENDOSCOPY     • HAND SURGERY Left    • KNEE ARTHROSCOPY Left    • MOUTH SURGERY      FULL MOUTH EXTRACTION       Medications  has a current medication list which includes the following prescription(s): accu-chek softclix lancets, albuterol sulfate hfa, b-d single use swabs regular, amoxicillin-clavulanate, aspirin, bisacodyl, blood gluc meter disp-strips, surechek control solution, carvedilol, vitamin d, chromium-cinnamon, finasteride, glimepiride, glucosamine-chondroit-vit c-mn, glucose blood, havrix, shingrix, sitagliptin, and telmisartan-hydrochlorothiazide.      Allergies  Allergies   Allergen Reactions   • Statins Myalgia       Social History  Social History     Social History Narrative   • Not on file       Family History  He has no family history of bladder or kidney cancer  He has no family history of kidney stones      AUA Symptom Score:      Review of Systems  Review of Systems    Physical Exam  Physical Exam   Constitutional: He is oriented to person, place, and time. He appears well-developed and well-nourished.   HENT:   Head: Normocephalic and atraumatic. "   Neck: Normal range of motion.   Pulmonary/Chest: Effort normal. No respiratory distress.   Abdominal: Soft. He exhibits no distension and no mass. There is no tenderness. No hernia.   Genitourinary: Rectum normal and prostate normal.   Musculoskeletal: Normal range of motion.   Lymphadenopathy:     He has no cervical adenopathy.   Neurological: He is alert and oriented to person, place, and time.   Skin: Skin is warm and dry.   Psychiatric: He has a normal mood and affect. His behavior is normal.   Vitals reviewed.      Labs Recent and today in the office:  Results for orders placed or performed in visit on 02/08/19   POC Urinalysis Dipstick, Automated   Result Value Ref Range    Color Yellow Yellow, Straw, Dark Yellow, Mary    Clarity, UA Clear Clear    Specific Gravity  1.025 1.005 - 1.030    pH, Urine 6.0 5.0 - 8.0    Leukocytes Negative Negative    Nitrite, UA Negative Negative    Protein, POC Negative Negative mg/dL    Glucose, UA Negative Negative, 1000 mg/dL (3+) mg/dL    Ketones, UA Negative Negative    Urobilinogen, UA Normal Normal    Bilirubin Negative Negative    Blood, UA Negative Negative         Assessment & Plan  #1 prostate cancer: Digital rectal exam is benign and PSA is submitted.  If there is a significant progression I would recommend he proceed with treatment but otherwise he should eat a heart healthy diet and return in 6 months for surveillance.

## 2019-04-10 RX ORDER — GLIMEPIRIDE 4 MG/1
TABLET ORAL
Qty: 90 TABLET | Refills: 3 | OUTPATIENT
Start: 2019-04-10

## 2019-04-10 RX ORDER — GLIMEPIRIDE 4 MG/1
4 TABLET ORAL DAILY
Qty: 90 TABLET | Refills: 3 | Status: SHIPPED | OUTPATIENT
Start: 2019-04-10 | End: 2019-04-15 | Stop reason: SDUPTHER

## 2019-04-15 LAB
ALBUMIN SERPL-MCNC: 4 G/DL (ref 3.5–5)
ALBUMIN/GLOB SERPL: 1.5 G/DL (ref 1–2)
ALP SERPL-CCNC: 53 U/L (ref 38–126)
ALT SERPL-CCNC: 33 U/L (ref 13–69)
AST SERPL-CCNC: 24 U/L (ref 15–46)
BASOPHILS # BLD AUTO: 0.05 10*3/MM3 (ref 0–0.2)
BASOPHILS NFR BLD AUTO: 1 % (ref 0–1.5)
BILIRUB SERPL-MCNC: 0.7 MG/DL (ref 0.2–1.3)
BUN SERPL-MCNC: 23 MG/DL (ref 7–20)
BUN/CREAT SERPL: 17.7 (ref 6.3–21.9)
CALCIUM SERPL-MCNC: 10.1 MG/DL (ref 8.4–10.2)
CHLORIDE SERPL-SCNC: 103 MMOL/L (ref 98–107)
CHOLEST SERPL-MCNC: 176 MG/DL (ref 0–199)
CO2 SERPL-SCNC: 28 MMOL/L (ref 26–30)
CREAT SERPL-MCNC: 1.3 MG/DL (ref 0.6–1.3)
EOSINOPHIL # BLD AUTO: 0.18 10*3/MM3 (ref 0–0.4)
EOSINOPHIL NFR BLD AUTO: 3.6 % (ref 0.3–6.2)
ERYTHROCYTE [DISTWIDTH] IN BLOOD BY AUTOMATED COUNT: 12.4 % (ref 12.3–15.4)
GLOBULIN SER CALC-MCNC: 2.6 GM/DL
GLUCOSE SERPL-MCNC: 212 MG/DL (ref 74–98)
HCT VFR BLD AUTO: 37.1 % (ref 37.5–51)
HDLC SERPL-MCNC: 45 MG/DL (ref 40–60)
HGB BLD-MCNC: 12.7 G/DL (ref 13–17.7)
IMM GRANULOCYTES # BLD AUTO: 0.02 10*3/MM3 (ref 0–0.05)
IMM GRANULOCYTES NFR BLD AUTO: 0.4 % (ref 0–0.5)
LDLC SERPL CALC-MCNC: 88 MG/DL (ref 0–99)
LYMPHOCYTES # BLD AUTO: 1.81 10*3/MM3 (ref 0.7–3.1)
LYMPHOCYTES NFR BLD AUTO: 36.6 % (ref 19.6–45.3)
MCH RBC QN AUTO: 31.4 PG (ref 26.6–33)
MCHC RBC AUTO-ENTMCNC: 34.2 G/DL (ref 31.5–35.7)
MCV RBC AUTO: 91.8 FL (ref 79–97)
MONOCYTES # BLD AUTO: 0.47 10*3/MM3 (ref 0.1–0.9)
MONOCYTES NFR BLD AUTO: 9.5 % (ref 5–12)
NEUTROPHILS # BLD AUTO: 2.41 10*3/MM3 (ref 1.4–7)
NEUTROPHILS NFR BLD AUTO: 48.9 % (ref 42.7–76)
NRBC BLD AUTO-RTO: 0 /100 WBC (ref 0–0)
PLATELET # BLD AUTO: 180 10*3/MM3 (ref 140–450)
POTASSIUM SERPL-SCNC: 5.1 MMOL/L (ref 3.5–5.1)
PROT SERPL-MCNC: 6.6 G/DL (ref 6.3–8.2)
RBC # BLD AUTO: 4.04 10*6/MM3 (ref 4.14–5.8)
SODIUM SERPL-SCNC: 138 MMOL/L (ref 137–145)
T4 FREE SERPL-MCNC: 1.3 NG/DL (ref 0.78–2.19)
TRIGL SERPL-MCNC: 214 MG/DL
TSH SERPL DL<=0.005 MIU/L-ACNC: 0.74 MIU/ML (ref 0.47–4.68)
VIT B12 SERPL-MCNC: 798 PG/ML (ref 239–931)
VLDLC SERPL CALC-MCNC: 42.8 MG/DL
WBC # BLD AUTO: 4.94 10*3/MM3 (ref 3.4–10.8)

## 2019-04-15 RX ORDER — GLIMEPIRIDE 4 MG/1
4 TABLET ORAL DAILY
Qty: 90 TABLET | Refills: 3 | Status: SHIPPED | OUTPATIENT
Start: 2019-04-15 | End: 2020-04-13 | Stop reason: SDUPTHER

## 2019-04-23 ENCOUNTER — OFFICE VISIT (OUTPATIENT)
Dept: INTERNAL MEDICINE | Facility: CLINIC | Age: 74
End: 2019-04-23

## 2019-04-23 VITALS
DIASTOLIC BLOOD PRESSURE: 69 MMHG | SYSTOLIC BLOOD PRESSURE: 160 MMHG | HEIGHT: 72 IN | TEMPERATURE: 97.8 F | BODY MASS INDEX: 30.48 KG/M2 | OXYGEN SATURATION: 99 % | RESPIRATION RATE: 16 BRPM | WEIGHT: 225 LBS | HEART RATE: 63 BPM

## 2019-04-23 DIAGNOSIS — E11.42 TYPE 2 DIABETES, CONTROLLED, WITH PERIPHERAL NEUROPATHY (HCC): ICD-10-CM

## 2019-04-23 PROCEDURE — 99213 OFFICE O/P EST LOW 20 MIN: CPT | Performed by: INTERNAL MEDICINE

## 2019-04-23 RX ORDER — LOSARTAN POTASSIUM AND HYDROCHLOROTHIAZIDE 12.5; 5 MG/1; MG/1
1 TABLET ORAL DAILY
Qty: 90 TABLET | Refills: 3 | Status: SHIPPED | OUTPATIENT
Start: 2019-04-23 | End: 2020-04-13 | Stop reason: SDUPTHER

## 2019-04-23 RX ORDER — COLESEVELAM 180 1/1
625 TABLET ORAL 2 TIMES DAILY
Qty: 180 TABLET | Refills: 3 | Status: SHIPPED | OUTPATIENT
Start: 2019-04-23 | End: 2020-07-24

## 2019-04-23 NOTE — PROGRESS NOTES
Subjective     Patient ID: Fazal Berg III is a 74 y.o. male. Patient is here for management of multiple medical problems.     Chief Complaint   Patient presents with   • Diabetes   • Hypertension     Diabetes   He presents for his follow-up diabetic visit. He has type 2 diabetes mellitus. No MedicAlert identification noted. His disease course has been worsening. Pertinent negatives for hypoglycemia include no confusion, dizziness, headaches or hunger. Pertinent negatives for diabetes include no blurred vision, no chest pain, no fatigue and no foot paresthesias. Pertinent negatives for hypoglycemia complications include no blackouts and no hospitalization. Symptoms are worsening. Pertinent negatives for diabetic complications include no autonomic neuropathy, CVA or heart disease. Risk factors for coronary artery disease include diabetes mellitus, dyslipidemia, hypertension, male sex and sedentary lifestyle. His weight is stable. He is following a diabetic and generally healthy diet. He participates in exercise daily. There is no change in his home blood glucose trend. An ACE inhibitor/angiotensin II receptor blocker is being taken. Eye exam is current.   Hypertension   This is a chronic problem. The current episode started more than 1 year ago. The problem has been rapidly worsening since onset. The problem is controlled. Pertinent negatives include no blurred vision, chest pain or headaches. There is no history of CVA.      Off losartan due to recal and replacement was to expensive.            The following portions of the patient's history were reviewed and updated as appropriate: allergies, current medications, past family history, past medical history, past social history, past surgical history and problem list.    Review of Systems   Constitutional: Negative for fatigue.   Eyes: Negative for blurred vision.   Cardiovascular: Negative for chest pain.   Neurological: Negative for dizziness and headaches.    Psychiatric/Behavioral: Negative for confusion.       Current Outpatient Medications:   •  ACCU-CHEK SOFTCLIX LANCETS lancets, 1 each by Other route 3 (Three) Times a Day. Use as instructed, Disp: 300 each, Rfl: 3  •  albuterol (VENTOLIN HFA) 108 (90 BASE) MCG/ACT inhaler, Ventolin  (90 Base) MCG/ACT Inhalation Aerosol Solution; Patient Sig: Ventolin  (90 Base) MCG/ACT Inhalation Aerosol Solution INHALE 1 TO 2 PUFFS EVERY 4 TO 6 HOURS AS NEEDED.; 1; 11; 18-Aug-2015; Active, Disp: , Rfl:   •  Alcohol Swabs (B-D SINGLE USE SWABS REGULAR) pads, 1 swab 3 (three) times a day., Disp: 90 each, Rfl: 3  •  aspirin 81 MG EC tablet, Take 81 mg by mouth Daily., Disp: , Rfl:   •  bisacodyl (bisacodyl) 5 MG EC tablet, Take 1 tablet by mouth Daily., Disp: 4 tablet, Rfl: 0  •  Blood Gluc Meter Disp-Strips device, 1 strip 3 (Three) Times a Day. Patient needs Accu-chek meter and test strips., Disp: 1 each, Rfl: 0  •  Blood Glucose Calibration (SURECHEK CONTROL SOLUTION) NORMAL liquid, 1 bottle by In Vitro route every 30 (thirty) days., Disp: 3 each, Rfl: 3  •  carvedilol (COREG) 25 MG tablet, Take 1 tablet by mouth 2 (Two) Times a Day With Meals., Disp: 180 tablet, Rfl: 3  •  Cholecalciferol (VITAMIN D) 2000 UNITS tablet, Take 1 capsule by mouth 3 (Three) Times a Week., Disp: , Rfl:   •  Chromium-Cinnamon (CINNAMON PLUS CHROMIUM) 100-500 MCG-MG capsule, Take 2 capsules by mouth 2 (two) times a day., Disp: , Rfl:   •  finasteride (PROSCAR) 5 MG tablet, Take 1 tablet by mouth Daily., Disp: 90 tablet, Rfl: 2  •  glimepiride (AMARYL) 4 MG tablet, Take 1 tablet by mouth Daily., Disp: 90 tablet, Rfl: 3  •  Glucosamine-Chondroit-Vit C-Mn (GLUCOSAMINE 1500 COMPLEX PO), Take 1 capsule by mouth 2 (Two) Times a Day., Disp: , Rfl:   •  glucose blood (ACCU-CHEK IDANIA) test strip, 1 each by Other route 3 (Three) Times a Day. Use as instructed, Disp: 300 each, Rfl: 3  •  SITagliptin (JANUVIA) 100 MG tablet, Take 1 tablet by mouth  "Daily., Disp: 28 tablet, Rfl: 6  •  colesevelam (WELCHOL) 625 MG tablet, Take 1 tablet by mouth 2 (Two) Times a Day., Disp: 180 tablet, Rfl: 3  •  losartan-hydrochlorothiazide (HYZAAR) 50-12.5 MG per tablet, Take 1 tablet by mouth Daily., Disp: 90 tablet, Rfl: 3    Objective      Blood pressure 160/69, pulse 63, temperature 97.8 °F (36.6 °C), resp. rate 16, height 182.9 cm (72\"), weight 102 kg (225 lb), SpO2 99 %.    Physical Exam     General Appearance:    Alert, cooperative, no distress, appears stated age   Head:    Normocephalic, without obvious abnormality, atraumatic   Eyes:    PERRL, conjunctiva/corneas clear, EOM's intact   Ears:    Normal TM's and external ear canals, both ears   Nose:   Nares normal, septum midline, mucosa normal, no drainage   or sinus tenderness   Throat:   Lips, mucosa, and tongue normal; teeth and gums normal   Neck:   Supple, symmetrical, trachea midline, no adenopathy;        thyroid:  No enlargement/tenderness/nodules; no carotid    bruit or JVD   Back:     Symmetric, no curvature, ROM normal, no CVA tenderness   Lungs:     Clear to auscultation bilaterally, respirations unlabored   Chest wall:    No tenderness or deformity   Heart:    Regular rate and rhythm, S1 and S2 normal, no murmur,        rub or gallop   Abdomen:     Soft, non-tender, bowel sounds active all four quadrants,     no masses, no organomegaly   Extremities:   Extremities normal, atraumatic, no cyanosis or edema   Pulses:   2+ and symmetric all extremities   Skin:   Skin color, texture, turgor normal, no rashes or lesions   Lymph nodes:   Cervical, supraclavicular, and axillary nodes normal   Neurologic:   CNII-XII intact. Normal strength, sensation and reflexes       throughout      Results for orders placed or performed in visit on 02/08/19   PSA Diagnostic   Result Value Ref Range    PSA 1.450 0.060 - 4.000 ng/mL   POC Urinalysis Dipstick, Automated   Result Value Ref Range    Color Yellow Yellow, Straw, Dark " Yellow, Mary    Clarity, UA Clear Clear    Specific Gravity  1.025 1.005 - 1.030    pH, Urine 6.0 5.0 - 8.0    Leukocytes Negative Negative    Nitrite, UA Negative Negative    Protein, POC Negative Negative mg/dL    Glucose, UA Negative Negative, 1000 mg/dL (3+) mg/dL    Ketones, UA Negative Negative    Urobilinogen, UA Normal Normal    Bilirubin Negative Negative    Blood, UA Negative Negative         Assessment/Plan     Anemia stable  Pt needs to get back on bp meds.  Out of januvia and out of wellchole.  Diarrhea worse off welchole.        Fazal was seen today for diabetes and hypertension.    Diagnoses and all orders for this visit:    Type 2 diabetes, controlled, with peripheral neuropathy (CMS/HCC)  -     SITagliptin (JANUVIA) 100 MG tablet; Take 1 tablet by mouth Daily.  -     Vitamin B12  -     Comprehensive Metabolic Panel  -     T4, Free  -     Hemoglobin A1c  -     MicroAlbumin, Urine, Random - Urine, Clean Catch    Other orders  -     losartan-hydrochlorothiazide (HYZAAR) 50-12.5 MG per tablet; Take 1 tablet by mouth Daily.  -     colesevelam (WELCHOL) 625 MG tablet; Take 1 tablet by mouth 2 (Two) Times a Day.      Return in about 4 months (around 8/23/2019).          There are no Patient Instructions on file for this visit.     Matthew Carr MD    Assessment/Plan

## 2019-06-24 DIAGNOSIS — E11.42 TYPE 2 DIABETES, CONTROLLED, WITH PERIPHERAL NEUROPATHY (HCC): ICD-10-CM

## 2019-07-19 ENCOUNTER — OFFICE VISIT (OUTPATIENT)
Dept: SURGERY | Facility: CLINIC | Age: 74
End: 2019-07-19

## 2019-07-19 VITALS
TEMPERATURE: 97.8 F | SYSTOLIC BLOOD PRESSURE: 132 MMHG | HEART RATE: 99 BPM | BODY MASS INDEX: 29.66 KG/M2 | HEIGHT: 72 IN | WEIGHT: 219 LBS | OXYGEN SATURATION: 99 % | DIASTOLIC BLOOD PRESSURE: 68 MMHG

## 2019-07-19 DIAGNOSIS — L98.9 SKIN LESION: Primary | ICD-10-CM

## 2019-07-19 DIAGNOSIS — IMO0002 SQUAMOUS CELL CARCINOMA: Primary | ICD-10-CM

## 2019-07-19 PROCEDURE — 11622 EXC S/N/H/F/G MAL+MRG 1.1-2: CPT | Performed by: SURGERY

## 2019-07-19 PROCEDURE — 99213 OFFICE O/P EST LOW 20 MIN: CPT | Performed by: SURGERY

## 2019-07-19 PROCEDURE — 12041 INTMD RPR N-HF/GENIT 2.5CM/<: CPT | Performed by: SURGERY

## 2019-07-19 NOTE — PROGRESS NOTES
Patient: Fazal Berg III    YOB: 1945    Date: 07/19/2019    Primary Care Provider: Matthew Carr MD    Chief Complaint   Patient presents with   • Skin Lesion     scalp       SUBJECTIVE:    History of present illness:  Patient is in the office today for evaluation and consultation for skin lesion on scalp. Patient states he is follow up on dermatology.  He states that he has had this lesion for over a year.  Has increased in size.  It was recently biopsied and found to have squamous cell carcinoma in it.  Causes localized irritation but otherwise no complaints.    The following portions of the patient's history were reviewed and updated as appropriate: allergies, current medications, past family history, past medical history, past social history, past surgical history and problem list.       Review of Systems   Constitutional: Negative for chills, fever and unexpected weight change.   HENT: Negative for trouble swallowing and voice change.    Eyes: Negative for visual disturbance.   Respiratory: Negative for apnea, cough, chest tightness, shortness of breath and wheezing.    Cardiovascular: Negative for chest pain, palpitations and leg swelling.   Gastrointestinal: Negative for abdominal distention, abdominal pain, anal bleeding, blood in stool, constipation, diarrhea, nausea, rectal pain and vomiting.   Endocrine: Negative for cold intolerance and heat intolerance.   Genitourinary: Negative for difficulty urinating, dysuria, flank pain, scrotal swelling and testicular pain.   Musculoskeletal: Negative for back pain, gait problem and joint swelling.   Skin: Positive for color change. Negative for rash and wound.   Neurological: Negative for dizziness, syncope, speech difficulty, weakness, numbness and headaches.   Hematological: Negative for adenopathy. Does not bruise/bleed easily.   Psychiatric/Behavioral: Negative for confusion. The patient is not nervous/anxious.         Allergies:  Allergies   Allergen Reactions   • Statins Myalgia       Medications:    Current Outpatient Medications:   •  ACCU-CHEK SOFTCLIX LANCETS lancets, 1 each by Other route 3 (Three) Times a Day. Use as instructed, Disp: 300 each, Rfl: 3  •  albuterol (VENTOLIN HFA) 108 (90 BASE) MCG/ACT inhaler, Ventolin  (90 Base) MCG/ACT Inhalation Aerosol Solution; Patient Sig: Ventolin  (90 Base) MCG/ACT Inhalation Aerosol Solution INHALE 1 TO 2 PUFFS EVERY 4 TO 6 HOURS AS NEEDED.; 1; 11; 18-Aug-2015; Active, Disp: , Rfl:   •  Alcohol Swabs (B-D SINGLE USE SWABS REGULAR) pads, 1 swab 3 (three) times a day., Disp: 90 each, Rfl: 3  •  aspirin 81 MG EC tablet, Take 81 mg by mouth Daily., Disp: , Rfl:   •  bisacodyl (bisacodyl) 5 MG EC tablet, Take 1 tablet by mouth Daily., Disp: 4 tablet, Rfl: 0  •  Blood Gluc Meter Disp-Strips device, 1 strip 3 (Three) Times a Day. Patient needs Accu-chek meter and test strips., Disp: 1 each, Rfl: 0  •  Blood Glucose Calibration (SURECHEK CONTROL SOLUTION) NORMAL liquid, 1 bottle by In Vitro route every 30 (thirty) days., Disp: 3 each, Rfl: 3  •  carvedilol (COREG) 25 MG tablet, Take 1 tablet by mouth 2 (Two) Times a Day With Meals., Disp: 180 tablet, Rfl: 3  •  Cholecalciferol (VITAMIN D) 2000 UNITS tablet, Take 1 capsule by mouth 3 (Three) Times a Week., Disp: , Rfl:   •  Chromium-Cinnamon (CINNAMON PLUS CHROMIUM) 100-500 MCG-MG capsule, Take 2 capsules by mouth 2 (two) times a day., Disp: , Rfl:   •  colesevelam (WELCHOL) 625 MG tablet, Take 1 tablet by mouth 2 (Two) Times a Day., Disp: 180 tablet, Rfl: 3  •  finasteride (PROSCAR) 5 MG tablet, Take 1 tablet by mouth Daily., Disp: 90 tablet, Rfl: 2  •  glimepiride (AMARYL) 4 MG tablet, Take 1 tablet by mouth Daily., Disp: 90 tablet, Rfl: 3  •  Glucosamine-Chondroit-Vit C-Mn (GLUCOSAMINE 1500 COMPLEX PO), Take 1 capsule by mouth 2 (Two) Times a Day., Disp: , Rfl:   •  glucose blood (ACCU-CHEK IDANIA) test strip, 1 each  "by Other route 3 (Three) Times a Day. Use as instructed, Disp: 300 each, Rfl: 3  •  losartan-hydrochlorothiazide (HYZAAR) 50-12.5 MG per tablet, Take 1 tablet by mouth Daily., Disp: 90 tablet, Rfl: 3  •  SITagliptin (JANUVIA) 100 MG tablet, Take 1 tablet by mouth Daily., Disp: 28 tablet, Rfl: 6    History:  Past Medical History:   Diagnosis Date   • Acute asthma exacerbation    • Acute sinusitis    • Acute URI    • Allergic rhinitis    • Anemia    • Arthritis    • Asthma    • Benign prostatic hypertrophy    • Bronchitis    • Chest pain     \"I'VE SEEN DR. CARRASCO FOR IT A LONG TIME AGO, AND THAT'S WHEN HE DID TESTING\".  STATES HAS NOT MENTIONED IT TO DR. KHAN.  STATES UNSURE OF WHEN LAST TIME WAS\".  PT STATES \"I'M NOT A COMPLAINER\".  STATES COLD WEATHER AND WALKING BRING IT ON.  GETS SHORTNESS OF BREATH OCCASSIONALLY.  MORE DESCRIBED AS PRESSURE.    • ED (erectile dysfunction)    • Elevated cholesterol    • Family history of deafness and hearing loss    • Full dentures     INSTRUCTED NO ADHESIVES THE DOS   • GERD (gastroesophageal reflux disease)    • H/O echocardiogram 2003   • Hiatal hernia    • History of fracture     REPORTS TOE ON RIGHT FOOT - NO SURGICAL INTERVENTION REQUIRED   • [place] (hard of hearing)     SPOUSE REPORTS NO USE OF HEARING AIDS   • Hx of exercise stress test     SPOUSE REPORTS LAST DONE BY DR ALMANZA AROUND JUNE 2018 AND REPORTED ALL WNL'S AND MD RELEASED PATIENT   • Hyperlipidemia    • Hypertension    • Jaundice     AS A TEEN    • Obstructive sleep apnea     NO CPAP - SPOUSE REPORTS \"HE WOULDN'T DO IT\"   • Peripheral neuropathy    • Pneumonia     SPOUSE REPORTS HISTORY   • Prostate cancer (CMS/HCC)    • Short of breath on exertion    • Skin cancer    • Type 2 diabetes mellitus (CMS/HCC)    • Urinary frequency    • Vitamin B 12 deficiency    • Vitamin D deficiency    • Wears glasses        Past Surgical History:   Procedure Laterality Date   • APPENDECTOMY     • CARDIAC CATHETERIZATION  2003?    " "\"IT WAS OK\"   • CHOLECYSTECTOMY  06/2018   • CHOLECYSTECTOMY WITH INTRAOPERATIVE CHOLANGIOGRAM N/A 5/15/2018    Procedure: CHOLECYSTECTOMY LAPAROSCOPIC INTRAOPERATIVE CHOLANGIOGRAM;  Surgeon: Eric Bran MD;  Location: [facility] OR;  Service: General   • COLONOSCOPY     • COLONOSCOPY N/A 8/8/2018    Procedure: COLONOSCOPY;  Surgeon: Eric Bran MD;  Location: [facility] ENDOSCOPY;  Service: Gastroenterology   • ENDOSCOPY     • HAND SURGERY Left    • KNEE ARTHROSCOPY Left    • MOUTH SURGERY      FULL MOUTH EXTRACTION       Family History   Problem Relation Age of Onset   • Diabetes Mother    • Diabetes Father    • Arthritis Other    • Gout Other    • Heart disease Other    • Lung disease Other        Social History     Tobacco Use   • Smoking status: Never Smoker   • Smokeless tobacco: Never Used   Substance Use Topics   • Alcohol use: No   • Drug use: No        OBJECTIVE:    Vital Signs:   Vitals:    07/19/19 0956   BP: 132/68   Pulse: 99   Temp: 97.8 °F (36.6 °C)   SpO2: 99%   Weight: 99.3 kg (219 lb)   Height: 182.9 cm (72\")       Physical Exam:   General Appearance:   Alert and oriented x3    Heart:   Regular rate and rhythm   Skin:  1.2 cm scalp lesion with central eschar.     Results Review:   I reviewed the patient's new clinical results.  Pathology was reviewed  ASSESSMENT/PLAN:    1. Squamous cell carcinoma        Recommend excision.  He understands the reason for this and he understands procedure.  He also understands the risk of bleeding and infection.  He also understands that I may not be able to fully close this given the location on the scalp and its size.  He wishes to proceed.    The scalp was prepped in usual fashion.  Lidocaine was used locally.  An elliptical incision was made around the specimen.  Full-thickness excision was performed.  Including margins this measured 1.5 cm.  Wound was closed in layers using interrupted simple Vicryl closure and a running simple nylon closure for the skin. "  There was minimal blood loss and hemostasis had been well controlled.  Wound care instructions were given.  Follow-up in a week.    Electronically signed by Eric Bran MD  07/19/19      .

## 2019-07-20 DIAGNOSIS — N40.1 BENIGN NON-NODULAR PROSTATIC HYPERPLASIA WITH LOWER URINARY TRACT SYMPTOMS: ICD-10-CM

## 2019-07-22 RX ORDER — FINASTERIDE 5 MG/1
TABLET, FILM COATED ORAL
Qty: 90 TABLET | Refills: 2 | Status: SHIPPED | OUTPATIENT
Start: 2019-07-22 | End: 2020-02-18 | Stop reason: SDUPTHER

## 2019-07-26 ENCOUNTER — OFFICE VISIT (OUTPATIENT)
Dept: SURGERY | Facility: CLINIC | Age: 74
End: 2019-07-26

## 2019-07-26 VITALS
BODY MASS INDEX: 30.48 KG/M2 | SYSTOLIC BLOOD PRESSURE: 124 MMHG | OXYGEN SATURATION: 98 % | HEART RATE: 66 BPM | TEMPERATURE: 98.5 F | HEIGHT: 72 IN | WEIGHT: 225 LBS | DIASTOLIC BLOOD PRESSURE: 62 MMHG

## 2019-07-26 DIAGNOSIS — Z48.89 POSTOPERATIVE VISIT: Primary | ICD-10-CM

## 2019-07-26 PROCEDURE — 99024 POSTOP FOLLOW-UP VISIT: CPT | Performed by: SURGERY

## 2019-07-26 NOTE — PROGRESS NOTES
"Patient: Fazal Berg III    YOB: 1945    Date: 07/26/2019    Primary Care Provider: Matthew Carr MD    Reason for Consultation: Follow-up lesion excision    Chief Complaint   Patient presents with   • Follow-up     exision scalp       History of present illness:      The following portions of the patient's history were reviewed and updated as appropriate: allergies, current medications, past family history, past medical history, past social history, past surgical history and problem list.      Vital Signs:  Vitals:    07/26/19 0859   BP: 124/62   Pulse: 66   Temp: 98.5 °F (36.9 °C)   SpO2: 98%   Weight: 102 kg (225 lb)   Height: 182.9 cm (72\")       Physical Exam:   General Appearance:    Alert, cooperative, in no acute distress.  Wound without evidence of infection.  Sutures intact       Assessment / Plan:    1. Postoperative visit        Doing well after excision.  Wound somewhat tight due to the wider excision.  I will leave the sutures in until early next week.  Spoke to the pathologist.  Pathologically the deep margin is close but technically clear.  I recommend observation.    Electronically signed by Eric Bran MD  07/26/19                    "

## 2019-07-30 ENCOUNTER — OFFICE VISIT (OUTPATIENT)
Dept: SURGERY | Facility: CLINIC | Age: 74
End: 2019-07-30

## 2019-07-30 VITALS
WEIGHT: 218 LBS | OXYGEN SATURATION: 98 % | RESPIRATION RATE: 18 BRPM | HEART RATE: 78 BPM | TEMPERATURE: 98.4 F | SYSTOLIC BLOOD PRESSURE: 142 MMHG | HEIGHT: 72 IN | BODY MASS INDEX: 29.53 KG/M2 | DIASTOLIC BLOOD PRESSURE: 80 MMHG

## 2019-07-30 DIAGNOSIS — Z48.89 POSTOPERATIVE VISIT: Primary | ICD-10-CM

## 2019-07-30 DIAGNOSIS — C61 PROSTATE CANCER (HCC): Primary | ICD-10-CM

## 2019-07-30 LAB — PSA SERPL-MCNC: 1.28 NG/ML (ref 0.06–4)

## 2019-07-30 PROCEDURE — 99024 POSTOP FOLLOW-UP VISIT: CPT | Performed by: SURGERY

## 2019-07-30 NOTE — PROGRESS NOTES
"Patient: Fazal Berg III    YOB: 1945    Date: 07/30/2019    Primary Care Provider: Matthew Carr MD    Reason for Consultation: Follow-up skin lesion excision on scalp.    Chief Complaint   Patient presents with   • Post-op Follow-up     scalp skin lesion.       History of present illness: Patient without complaints today.    The following portions of the patient's history were reviewed and updated as appropriate: allergies, current medications, past family history, past medical history, past social history, past surgical history and problem list.      Vital Signs:  Vitals:    07/30/19 0953   BP: 142/80   Pulse: 78   Resp: 18   Temp: 98.4 °F (36.9 °C)   TempSrc: Temporal   SpO2: 98%   Weight: 98.9 kg (218 lb)   Height: 182.9 cm (72\")       Physical Exam:   General Appearance:    Alert, cooperative, in no acute distress, wound clean dry without infection.  Slight dehiscence peripherally but no evidence of infection.  Sutures were removed       Assessment / Plan:    1. Postoperative visit      Overall doing well.  Continue with the topical antibiotics.  This should be fully healed in the next week.  I will see him in 6 weeks to evaluate the excision site since the deep margins were close   But still considered negative per the pathologist.    Electronically signed by Eric Bran MD  07/30/19              Portions of this note have been scribed for Eric Bran MD by Shazia De La Cruz. 7/30/2019  10:05 AM        "

## 2019-08-13 ENCOUNTER — OFFICE VISIT (OUTPATIENT)
Dept: UROLOGY | Facility: CLINIC | Age: 74
End: 2019-08-13

## 2019-08-13 VITALS
SYSTOLIC BLOOD PRESSURE: 130 MMHG | RESPIRATION RATE: 16 BRPM | HEART RATE: 65 BPM | OXYGEN SATURATION: 97 % | DIASTOLIC BLOOD PRESSURE: 82 MMHG | TEMPERATURE: 98.7 F

## 2019-08-13 DIAGNOSIS — C61 PROSTATE CANCER (HCC): Primary | ICD-10-CM

## 2019-08-13 PROCEDURE — 99213 OFFICE O/P EST LOW 20 MIN: CPT | Performed by: UROLOGY

## 2019-08-13 PROCEDURE — 81003 URINALYSIS AUTO W/O SCOPE: CPT | Performed by: UROLOGY

## 2019-08-13 NOTE — PROGRESS NOTES
"Chief Complaint  Prostate Cancer (6 month follow up.)        TIFFANY Berg is a 74 y.o. male who returns today for semiannual visit of his prostate in light of a previous diagnosis of prostate cancer when his PSA was over 10.  Fortunately the biopsy revealed low-grade noninvasive disease and since taking Proscar to shrink the gland his PSA has been down to normal.  He was recently measured only 1.28.  He is always had a benign prostate on digital rectal exam and currently has no difficulty voiding.  He presents today with clear urine.    Vitals:    08/13/19 1423   BP: 130/82   Pulse: 65   Resp: 16   Temp: 98.7 °F (37.1 °C)   SpO2: 97%       Past Medical History  Past Medical History:   Diagnosis Date   • Acute asthma exacerbation    • Acute sinusitis    • Acute URI    • Allergic rhinitis    • Anemia    • Arthritis    • Asthma    • Benign prostatic hypertrophy    • Bronchitis    • Chest pain     \"I'VE SEEN DR. CARRASCO FOR IT A LONG TIME AGO, AND THAT'S WHEN HE DID TESTING\".  STATES HAS NOT MENTIONED IT TO DR. KHAN.  STATES UNSURE OF WHEN LAST TIME WAS\".  PT STATES \"I'M NOT A COMPLAINER\".  STATES COLD WEATHER AND WALKING BRING IT ON.  GETS SHORTNESS OF BREATH OCCASSIONALLY.  MORE DESCRIBED AS PRESSURE.    • ED (erectile dysfunction)    • Elevated cholesterol    • Family history of deafness and hearing loss    • Full dentures     INSTRUCTED NO ADHESIVES THE DOS   • GERD (gastroesophageal reflux disease)    • H/O echocardiogram 2003   • Hiatal hernia    • History of fracture     REPORTS TOE ON RIGHT FOOT - NO SURGICAL INTERVENTION REQUIRED   • [place] (hard of hearing)     SPOUSE REPORTS NO USE OF HEARING AIDS   • Hx of exercise stress test     SPOUSE REPORTS LAST DONE BY DR ALMANZA AROUND JUNE 2018 AND REPORTED ALL WNL'S AND MD RELEASED PATIENT   • Hyperlipidemia    • Hypertension    • Jaundice     AS A TEEN    • Obstructive sleep apnea     NO CPAP - SPOUSE REPORTS \"HE WOULDN'T DO IT\"   • Peripheral neuropathy    • " "Pneumonia     SPOUSE REPORTS HISTORY   • Prostate cancer (CMS/HCC)    • Short of breath on exertion    • Skin cancer    • Type 2 diabetes mellitus (CMS/HCC)    • Urinary frequency    • Vitamin B 12 deficiency    • Vitamin D deficiency    • Wears glasses        Past Surgical History  Past Surgical History:   Procedure Laterality Date   • APPENDECTOMY     • CARDIAC CATHETERIZATION  2003?    \"IT WAS OK\"   • CHOLECYSTECTOMY  06/2018   • CHOLECYSTECTOMY WITH INTRAOPERATIVE CHOLANGIOGRAM N/A 5/15/2018    Procedure: CHOLECYSTECTOMY LAPAROSCOPIC INTRAOPERATIVE CHOLANGIOGRAM;  Surgeon: Eric Bran MD;  Location: [facility] OR;  Service: General   • COLONOSCOPY     • COLONOSCOPY N/A 8/8/2018    Procedure: COLONOSCOPY;  Surgeon: Eric Bran MD;  Location: [facility] ENDOSCOPY;  Service: Gastroenterology   • ENDOSCOPY     • HAND SURGERY Left    • KNEE ARTHROSCOPY Left    • MOUTH SURGERY      FULL MOUTH EXTRACTION       Medications  has a current medication list which includes the following prescription(s): accu-chek softclix lancets, albuterol sulfate hfa, b-d single use swabs regular, aspirin, bisacodyl, blood gluc meter disp-strips, surechek control solution, carvedilol, vitamin d, chromium-cinnamon, colesevelam, finasteride, glimepiride, glucosamine-chondroit-vit c-mn, glucose blood, losartan-hydrochlorothiazide, and sitagliptin.      Allergies  Allergies   Allergen Reactions   • Statins Myalgia       Social History  Social History     Social History Narrative   • Not on file       Family History  He has no family history of bladder or kidney cancer  He has no family history of kidney stones      AUA Symptom Score:      Review of Systems  Review of Systems   Constitutional: Negative.    Genitourinary: Negative.    All other systems reviewed and are negative.      Physical Exam  Physical Exam   Constitutional: He is oriented to person, place, and time. He appears well-developed and well-nourished.   HENT:   Head: " Normocephalic and atraumatic.   Neck: Normal range of motion.   Pulmonary/Chest: Effort normal. No respiratory distress.   Abdominal: He exhibits no distension and no mass. There is no tenderness. No hernia.   Musculoskeletal: Normal range of motion.   Lymphadenopathy:     He has no cervical adenopathy.   Neurological: He is alert and oriented to person, place, and time.   Skin: Skin is warm and dry.   Psychiatric: He has a normal mood and affect. His behavior is normal.   Vitals reviewed.      Labs Recent and today in the office:  Results for orders placed or performed in visit on 08/13/19   POC Urinalysis Dipstick, Automated   Result Value Ref Range    Color Yellow Yellow, Straw, Dark Yellow, Mary    Clarity, UA Clear Clear    Specific Gravity  1.025 1.005 - 1.030    pH, Urine 5.5 5.0 - 8.0    Leukocytes Negative Negative    Nitrite, UA Negative Negative    Protein, POC Negative Negative mg/dL    Glucose, UA Negative Negative, 1000 mg/dL (3+) mg/dL    Ketones, UA Negative Negative    Urobilinogen, UA Normal Normal    Bilirubin Negative Negative    Blood, UA Negative Negative         Assessment & Plan  Prostate cancer: Early stage and low-grade and being managed on a program of active surveillance.  We discussed proceeding with [employer]ife radiation at any time the disease seems to be progressing.  PSA is continued decline on Proscar so he will continue this med and return in 6 months or as needed.

## 2019-08-20 LAB
ALBUMIN SERPL-MCNC: 4.5 G/DL (ref 3.5–5.2)
ALBUMIN/GLOB SERPL: 1.8 G/DL
ALP SERPL-CCNC: 59 U/L (ref 39–117)
ALT SERPL-CCNC: 16 U/L (ref 1–41)
AST SERPL-CCNC: 20 U/L (ref 1–40)
BILIRUB SERPL-MCNC: 0.8 MG/DL (ref 0.2–1.2)
BUN SERPL-MCNC: 30 MG/DL (ref 8–23)
BUN/CREAT SERPL: 22.6 (ref 7–25)
CALCIUM SERPL-MCNC: 10.2 MG/DL (ref 8.6–10.5)
CHLORIDE SERPL-SCNC: 103 MMOL/L (ref 98–107)
CO2 SERPL-SCNC: 25.9 MMOL/L (ref 22–29)
CREAT SERPL-MCNC: 1.33 MG/DL (ref 0.76–1.27)
GLOBULIN SER CALC-MCNC: 2.5 GM/DL
GLUCOSE SERPL-MCNC: 164 MG/DL (ref 65–99)
HBA1C MFR BLD: 6.8 % (ref 4.8–5.6)
MICROALBUMIN UR-MCNC: 4.1 UG/ML
POTASSIUM SERPL-SCNC: 5.3 MMOL/L (ref 3.5–5.2)
PROT SERPL-MCNC: 7 G/DL (ref 6–8.5)
SODIUM SERPL-SCNC: 141 MMOL/L (ref 136–145)
T4 FREE SERPL-MCNC: 1.08 NG/DL (ref 0.93–1.7)
VIT B12 SERPL-MCNC: 551 PG/ML (ref 211–946)

## 2019-08-26 ENCOUNTER — OFFICE VISIT (OUTPATIENT)
Dept: INTERNAL MEDICINE | Facility: CLINIC | Age: 74
End: 2019-08-26

## 2019-08-26 VITALS
TEMPERATURE: 97.5 F | OXYGEN SATURATION: 97 % | DIASTOLIC BLOOD PRESSURE: 55 MMHG | BODY MASS INDEX: 29.66 KG/M2 | HEART RATE: 75 BPM | WEIGHT: 219 LBS | RESPIRATION RATE: 18 BRPM | HEIGHT: 72 IN | SYSTOLIC BLOOD PRESSURE: 145 MMHG

## 2019-08-26 DIAGNOSIS — I10 ESSENTIAL HYPERTENSION: ICD-10-CM

## 2019-08-26 DIAGNOSIS — E78.5 HYPERLIPIDEMIA, UNSPECIFIED HYPERLIPIDEMIA TYPE: ICD-10-CM

## 2019-08-26 DIAGNOSIS — E11.42 TYPE 2 DIABETES, CONTROLLED, WITH PERIPHERAL NEUROPATHY (HCC): Primary | ICD-10-CM

## 2019-08-26 PROCEDURE — 99214 OFFICE O/P EST MOD 30 MIN: CPT | Performed by: INTERNAL MEDICINE

## 2019-08-26 NOTE — PROGRESS NOTES
Subjective     Patient ID: Fazal Berg III is a 74 y.o. male. Patient is here for management of multiple medical problems.     Chief Complaint   Patient presents with   • Follow-up     diabetes and hypertension, needs some Januvia samples     Hypertension   This is a chronic problem. The current episode started more than 1 year ago. The problem is controlled. Pertinent negatives include no anxiety, blurred vision, chest pain or headaches. There are no associated agents to hypertension. Past treatments include ACE inhibitors. Current antihypertension treatment includes beta blockers, angiotensin blockers and diuretics. There are no compliance problems.  There is no history of kidney disease, CAD/MI or CVA. There is no history of chronic renal disease, coarctation of the aorta or hyperaldosteronism.   Diabetes   He presents for his follow-up diabetic visit. He has type 2 diabetes mellitus. His disease course has been stable. Pertinent negatives for hypoglycemia include no headaches. Pertinent negatives for diabetes include no blurred vision and no chest pain. Pertinent negatives for hypoglycemia complications include no blackouts and no hospitalization. Pertinent negatives for diabetic complications include no autonomic neuropathy, CVA, heart disease, impotence, nephropathy or peripheral neuropathy. Risk factors for coronary artery disease include diabetes mellitus, dyslipidemia, obesity and sedentary lifestyle. Current diabetic treatment includes oral agent (dual therapy). His weight is stable. He participates in exercise daily. There is no change in his home blood glucose trend. An ACE inhibitor/angiotensin II receptor blocker is being taken. Eye exam is current.    pt may not be taking the meds meds on the list. He will call back with his meds.            The following portions of the patient's history were reviewed and updated as appropriate: allergies, current medications, past family history, past medical  history, past social history, past surgical history and problem list.    Review of Systems   Eyes: Negative for blurred vision.   Cardiovascular: Negative for chest pain.   Genitourinary: Negative for impotence.   Neurological: Negative for headaches.       Current Outpatient Medications:   •  ACCU-CHEK SOFTCLIX LANCETS lancets, 1 each by Other route 3 (Three) Times a Day. Use as instructed, Disp: 300 each, Rfl: 3  •  albuterol (VENTOLIN HFA) 108 (90 BASE) MCG/ACT inhaler, Ventolin  (90 Base) MCG/ACT Inhalation Aerosol Solution; Patient Sig: Ventolin  (90 Base) MCG/ACT Inhalation Aerosol Solution INHALE 1 TO 2 PUFFS EVERY 4 TO 6 HOURS AS NEEDED.; 1; 11; 18-Aug-2015; Active, Disp: , Rfl:   •  Alcohol Swabs (B-D SINGLE USE SWABS REGULAR) pads, 1 swab 3 (three) times a day., Disp: 90 each, Rfl: 3  •  aspirin 81 MG EC tablet, Take 81 mg by mouth Daily., Disp: , Rfl:   •  bisacodyl (bisacodyl) 5 MG EC tablet, Take 1 tablet by mouth Daily., Disp: 4 tablet, Rfl: 0  •  Blood Gluc Meter Disp-Strips device, 1 strip 3 (Three) Times a Day. Patient needs Accu-chek meter and test strips., Disp: 1 each, Rfl: 0  •  Blood Glucose Calibration (SURECHEK CONTROL SOLUTION) NORMAL liquid, 1 bottle by In Vitro route every 30 (thirty) days., Disp: 3 each, Rfl: 3  •  carvedilol (COREG) 25 MG tablet, Take 1 tablet by mouth 2 (Two) Times a Day With Meals., Disp: 180 tablet, Rfl: 3  •  Cholecalciferol (VITAMIN D) 2000 UNITS tablet, Take 1 capsule by mouth 3 (Three) Times a Week., Disp: , Rfl:   •  Chromium-Cinnamon (CINNAMON PLUS CHROMIUM) 100-500 MCG-MG capsule, Take 2 capsules by mouth 2 (two) times a day., Disp: , Rfl:   •  colesevelam (WELCHOL) 625 MG tablet, Take 1 tablet by mouth 2 (Two) Times a Day., Disp: 180 tablet, Rfl: 3  •  finasteride (PROSCAR) 5 MG tablet, TAKE 1 TABLET EVERY DAY, Disp: 90 tablet, Rfl: 2  •  glimepiride (AMARYL) 4 MG tablet, Take 1 tablet by mouth Daily., Disp: 90 tablet, Rfl: 3  •   "Glucosamine-Chondroit-Vit C-Mn (GLUCOSAMINE 1500 COMPLEX PO), Take 1 capsule by mouth 2 (Two) Times a Day., Disp: , Rfl:   •  glucose blood (ACCU-CHEK IDANIA) test strip, 1 each by Other route 3 (Three) Times a Day. Use as instructed, Disp: 300 each, Rfl: 3  •  losartan-hydrochlorothiazide (HYZAAR) 50-12.5 MG per tablet, Take 1 tablet by mouth Daily., Disp: 90 tablet, Rfl: 3  •  SITagliptin (JANUVIA) 100 MG tablet, Take 1 tablet by mouth Daily., Disp: 28 tablet, Rfl: 6    Objective      Blood pressure 145/55, pulse 75, temperature 97.5 °F (36.4 °C), resp. rate 18, height 182.9 cm (72\"), weight 99.3 kg (219 lb), SpO2 97 %.    Physical Exam    Fazal had a diabetic foot exam performed today.   During the foot exam he had a monofilament test performed.    Neurological Sensory Findings - Unaltered hot/cold right ankle/foot discrimination and unaltered hot/cold left ankle/foot discrimination. Altered sharp/dull right ankle/foot discrimination. Unaltered sharp/dull left ankle/foot discrimination. Right ankle/foot altered proprioception. No left ankle/foot altered proprioception  Vascular Status -  His right foot exhibits normal foot vasculature  and no edema. His left foot exhibits normal foot vasculature  and no edema.  Skin Integrity  -  His right foot skin is intact.His left foot skin is intact..   Foot Structure and Biomechanics -  His right foot exhibits hallux valgus. His right foot has no pes cavus present.  His left foot exhibits hallux valgus and pes cavus.       General Appearance:    Alert, cooperative, no distress, appears stated age   Head:    Normocephalic, without obvious abnormality, atraumatic   Eyes:    PERRL, conjunctiva/corneas clear, EOM's intact   Ears:    Normal TM's and external ear canals, both ears   Nose:   Nares normal, septum midline, mucosa normal, no drainage   or sinus tenderness   Throat:   Lips, mucosa, and tongue normal; teeth and gums normal   Neck:   Supple, symmetrical, trachea midline, " no adenopathy;        thyroid:  No enlargement/tenderness/nodules; no carotid    bruit or JVD   Back:     Symmetric, no curvature, ROM normal, no CVA tenderness   Lungs:     Clear to auscultation bilaterally, respirations unlabored   Chest wall:    No tenderness or deformity   Heart:    Regular rate and rhythm, S1 and S2 normal, no murmur,        rub or gallop   Abdomen:     Soft, non-tender, bowel sounds active all four quadrants,     no masses, no organomegaly   Extremities:   Deformities in feet with flattening of arch. Lack of sensation to monofilament in right foot.   Extremities normal, atraumatic, no cyanosis or edema   Pulses:   2+ and symmetric all extremities   Skin:   Skin color, texture, turgor normal, no rashes or lesions   Lymph nodes:   Cervical, supraclavicular, and axillary nodes normal   Neurologic:   CNII-XII intact. Normal strength, sensation and reflexes       throughout      Results for orders placed or performed in visit on 08/13/19   POC Urinalysis Dipstick, Automated   Result Value Ref Range    Color Yellow Yellow, Straw, Dark Yellow, Mary    Clarity, UA Clear Clear    Specific Gravity  1.025 1.005 - 1.030    pH, Urine 5.5 5.0 - 8.0    Leukocytes Negative Negative    Nitrite, UA Negative Negative    Protein, POC Negative Negative mg/dL    Glucose, UA Negative Negative, 1000 mg/dL (3+) mg/dL    Ketones, UA Negative Negative    Urobilinogen, UA Normal Normal    Bilirubin Negative Negative    Blood, UA Negative Negative         Assessment/Plan   dmt2 improving.   Cr stable. k up slightly due to tomatoes.    Other labs stable.          Fazal was seen today for follow-up.    Diagnoses and all orders for this visit:    Type 2 diabetes, controlled, with peripheral neuropathy (CMS/HCC)  -     TSH  -     Comprehensive Metabolic Panel  -     T4, Free  -     Vitamin B12  -     CBC & Differential  -     Lipid Panel    Essential hypertension  -     TSH  -     Comprehensive Metabolic Panel  -     T4,  Free  -     Vitamin B12  -     CBC & Differential  -     Lipid Panel    Hyperlipidemia, unspecified hyperlipidemia type  -     TSH  -     Comprehensive Metabolic Panel  -     T4, Free  -     Vitamin B12  -     CBC & Differential  -     Lipid Panel    pt qualifies for diabetic shoes.    Will set up with Dr allie Isidro in about 4 months (around 12/26/2019).          There are no Patient Instructions on file for this visit.     Matthew Carr MD    Assessment/Plan

## 2019-09-09 NOTE — PROGRESS NOTES
"Patient: Fazal Berg III    YOB: 1945    Date: 09/10/2019    Primary Care Provider: Matthew Carr MD    Chief Complaint   Patient presents with   • Follow-up     skin cancer excision       History: The patient is in the office for a follow up visit after his recent excision of skin cancer on his scalp.      The following portions of the patient's history were reviewed and updated as appropriate: allergies, current medications, past family history, past medical history, past social history, past surgical history and problem list.      Review of Systems   Constitutional: Negative for chills, fatigue and fever.   Respiratory: Negative for cough.    Cardiovascular: Negative for chest pain.   Gastrointestinal: Negative for abdominal pain, diarrhea, nausea and vomiting.   Skin: Positive for wound.       Vital Signs  Vitals:    09/10/19 0933   BP: 140/84   Pulse: 72   Resp: 16   Temp: 97.6 °F (36.4 °C)   TempSrc: Temporal   SpO2: 98%   Weight: 99.8 kg (220 lb)   Height: 182.9 cm (72\")       Allergies:  Allergies   Allergen Reactions   • Statins Myalgia       Medications:    Current Outpatient Medications:   •  albuterol (VENTOLIN HFA) 108 (90 BASE) MCG/ACT inhaler, Ventolin  (90 Base) MCG/ACT Inhalation Aerosol Solution; Patient Sig: Ventolin  (90 Base) MCG/ACT Inhalation Aerosol Solution INHALE 1 TO 2 PUFFS EVERY 4 TO 6 HOURS AS NEEDED.; 1; 11; 18-Aug-2015; Active, Disp: , Rfl:   •  aspirin 81 MG EC tablet, Take 81 mg by mouth Daily., Disp: , Rfl:   •  bisacodyl (bisacodyl) 5 MG EC tablet, Take 1 tablet by mouth Daily., Disp: 4 tablet, Rfl: 0  •  carvedilol (COREG) 25 MG tablet, Take 1 tablet by mouth 2 (Two) Times a Day With Meals., Disp: 180 tablet, Rfl: 3  •  Cholecalciferol (VITAMIN D) 2000 UNITS tablet, Take 1 capsule by mouth 3 (Three) Times a Week., Disp: , Rfl:   •  Chromium-Cinnamon (CINNAMON PLUS CHROMIUM) 100-500 MCG-MG capsule, Take 2 capsules by mouth 2 (two) times a " day., Disp: , Rfl:   •  colesevelam (WELCHOL) 625 MG tablet, Take 1 tablet by mouth 2 (Two) Times a Day., Disp: 180 tablet, Rfl: 3  •  finasteride (PROSCAR) 5 MG tablet, TAKE 1 TABLET EVERY DAY, Disp: 90 tablet, Rfl: 2  •  glimepiride (AMARYL) 4 MG tablet, Take 1 tablet by mouth Daily., Disp: 90 tablet, Rfl: 3  •  Glucosamine-Chondroit-Vit C-Mn (GLUCOSAMINE 1500 COMPLEX PO), Take 1 capsule by mouth 2 (Two) Times a Day., Disp: , Rfl:   •  losartan-hydrochlorothiazide (HYZAAR) 50-12.5 MG per tablet, Take 1 tablet by mouth Daily., Disp: 90 tablet, Rfl: 3  •  SITagliptin (JANUVIA) 100 MG tablet, Take 1 tablet by mouth Daily., Disp: 28 tablet, Rfl: 6  •  ACCU-CHEK SOFTCLIX LANCETS lancets, 1 each by Other route 3 (Three) Times a Day. Use as instructed, Disp: 300 each, Rfl: 3  •  Alcohol Swabs (B-D SINGLE USE SWABS REGULAR) pads, 1 swab 3 (three) times a day., Disp: 90 each, Rfl: 3  •  Blood Gluc Meter Disp-Strips device, 1 strip 3 (Three) Times a Day. Patient needs Accu-chek meter and test strips., Disp: 1 each, Rfl: 0  •  Blood Glucose Calibration (SURECHEK CONTROL SOLUTION) NORMAL liquid, 1 bottle by In Vitro route every 30 (thirty) days., Disp: 3 each, Rfl: 3  •  glucose blood (ACCU-CHEK IDANIA) test strip, 1 each by Other route 3 (Three) Times a Day. Use as instructed, Disp: 300 each, Rfl: 3    Physical Exam:   General Appearance:    Alert, cooperative, in no acute distress   Skin:  Incision site is healing nicely.  No evidence of recurrence       Results Review:   None     ASSESSMENT/PLAN:    1. Postoperative visit      Patient with close margin and therefore I would like to have him follow-up with me in 6 months or sooner if he has any issues.    Electronically signed by Eric Bran MD  09/10/19

## 2019-09-10 ENCOUNTER — OFFICE VISIT (OUTPATIENT)
Dept: SURGERY | Facility: CLINIC | Age: 74
End: 2019-09-10

## 2019-09-10 VITALS
OXYGEN SATURATION: 98 % | TEMPERATURE: 97.6 F | RESPIRATION RATE: 16 BRPM | HEIGHT: 72 IN | HEART RATE: 72 BPM | SYSTOLIC BLOOD PRESSURE: 140 MMHG | BODY MASS INDEX: 29.8 KG/M2 | DIASTOLIC BLOOD PRESSURE: 84 MMHG | WEIGHT: 220 LBS

## 2019-09-10 DIAGNOSIS — Z48.89 POSTOPERATIVE VISIT: Primary | ICD-10-CM

## 2019-09-10 PROCEDURE — 99024 POSTOP FOLLOW-UP VISIT: CPT | Performed by: SURGERY

## 2019-09-12 DIAGNOSIS — E11.42 TYPE 2 DIABETES, CONTROLLED, WITH PERIPHERAL NEUROPATHY (HCC): ICD-10-CM

## 2019-10-07 ENCOUNTER — TELEPHONE (OUTPATIENT)
Dept: INTERNAL MEDICINE | Facility: CLINIC | Age: 74
End: 2019-10-07

## 2019-10-07 DIAGNOSIS — E11.42 TYPE 2 DIABETES, CONTROLLED, WITH PERIPHERAL NEUROPATHY (HCC): ICD-10-CM

## 2019-10-07 NOTE — TELEPHONE ENCOUNTER
Patient states that he needs a refill on his Metformin 1,000mg. He states he takes this once daily.

## 2019-10-21 RX ORDER — BLOOD-GLUCOSE METER
EACH MISCELLANEOUS
Qty: 1 KIT | Refills: 0 | Status: SHIPPED | OUTPATIENT
Start: 2019-10-21 | End: 2020-12-15 | Stop reason: SDUPTHER

## 2019-12-12 LAB
ALBUMIN SERPL-MCNC: 4.7 G/DL (ref 3.5–5.2)
ALBUMIN/GLOB SERPL: 2.1 G/DL
ALP SERPL-CCNC: 60 U/L (ref 39–117)
ALT SERPL-CCNC: 15 U/L (ref 1–41)
AST SERPL-CCNC: 17 U/L (ref 1–40)
BASOPHILS # BLD AUTO: 0.06 10*3/MM3 (ref 0–0.2)
BASOPHILS NFR BLD AUTO: 1.1 % (ref 0–1.5)
BILIRUB SERPL-MCNC: 0.7 MG/DL (ref 0.2–1.2)
BUN SERPL-MCNC: 17 MG/DL (ref 8–23)
BUN/CREAT SERPL: 12.8 (ref 7–25)
CALCIUM SERPL-MCNC: 10.1 MG/DL (ref 8.6–10.5)
CHLORIDE SERPL-SCNC: 101 MMOL/L (ref 98–107)
CHOLEST SERPL-MCNC: 195 MG/DL (ref 0–200)
CO2 SERPL-SCNC: 27.8 MMOL/L (ref 22–29)
CREAT SERPL-MCNC: 1.33 MG/DL (ref 0.76–1.27)
EOSINOPHIL # BLD AUTO: 0.18 10*3/MM3 (ref 0–0.4)
EOSINOPHIL NFR BLD AUTO: 3.3 % (ref 0.3–6.2)
ERYTHROCYTE [DISTWIDTH] IN BLOOD BY AUTOMATED COUNT: 12.3 % (ref 12.3–15.4)
GLOBULIN SER CALC-MCNC: 2.2 GM/DL
GLUCOSE SERPL-MCNC: 183 MG/DL (ref 65–99)
HCT VFR BLD AUTO: 42.5 % (ref 37.5–51)
HDLC SERPL-MCNC: 44 MG/DL (ref 40–60)
HGB BLD-MCNC: 14.1 G/DL (ref 13–17.7)
IMM GRANULOCYTES # BLD AUTO: 0.02 10*3/MM3 (ref 0–0.05)
IMM GRANULOCYTES NFR BLD AUTO: 0.4 % (ref 0–0.5)
LDLC SERPL CALC-MCNC: 94 MG/DL (ref 0–100)
LYMPHOCYTES # BLD AUTO: 1.63 10*3/MM3 (ref 0.7–3.1)
LYMPHOCYTES NFR BLD AUTO: 30.1 % (ref 19.6–45.3)
MCH RBC QN AUTO: 30.8 PG (ref 26.6–33)
MCHC RBC AUTO-ENTMCNC: 33.2 G/DL (ref 31.5–35.7)
MCV RBC AUTO: 92.8 FL (ref 79–97)
MONOCYTES # BLD AUTO: 0.52 10*3/MM3 (ref 0.1–0.9)
MONOCYTES NFR BLD AUTO: 9.6 % (ref 5–12)
NEUTROPHILS # BLD AUTO: 3 10*3/MM3 (ref 1.7–7)
NEUTROPHILS NFR BLD AUTO: 55.5 % (ref 42.7–76)
NRBC BLD AUTO-RTO: 0 /100 WBC (ref 0–0.2)
PLATELET # BLD AUTO: 213 10*3/MM3 (ref 140–450)
POTASSIUM SERPL-SCNC: 4.4 MMOL/L (ref 3.5–5.2)
PROT SERPL-MCNC: 6.9 G/DL (ref 6–8.5)
RBC # BLD AUTO: 4.58 10*6/MM3 (ref 4.14–5.8)
SODIUM SERPL-SCNC: 141 MMOL/L (ref 136–145)
T4 FREE SERPL-MCNC: 1.19 NG/DL (ref 0.93–1.7)
TRIGL SERPL-MCNC: 283 MG/DL (ref 0–150)
TSH SERPL DL<=0.005 MIU/L-ACNC: 1.46 UIU/ML (ref 0.27–4.2)
VIT B12 SERPL-MCNC: 516 PG/ML (ref 211–946)
VLDLC SERPL CALC-MCNC: 56.6 MG/DL
WBC # BLD AUTO: 5.41 10*3/MM3 (ref 3.4–10.8)

## 2019-12-16 ENCOUNTER — OFFICE VISIT (OUTPATIENT)
Dept: INTERNAL MEDICINE | Facility: CLINIC | Age: 74
End: 2019-12-16

## 2019-12-16 VITALS
RESPIRATION RATE: 16 BRPM | SYSTOLIC BLOOD PRESSURE: 145 MMHG | DIASTOLIC BLOOD PRESSURE: 65 MMHG | HEART RATE: 75 BPM | HEIGHT: 72 IN | WEIGHT: 222 LBS | BODY MASS INDEX: 30.07 KG/M2 | TEMPERATURE: 98.2 F | OXYGEN SATURATION: 98 %

## 2019-12-16 DIAGNOSIS — E11.9 DIABETES MELLITUS WITHOUT COMPLICATION (HCC): Primary | ICD-10-CM

## 2019-12-16 DIAGNOSIS — I10 ESSENTIAL HYPERTENSION: ICD-10-CM

## 2019-12-16 DIAGNOSIS — Z12.5 ENCOUNTER FOR SCREENING FOR MALIGNANT NEOPLASM OF PROSTATE: ICD-10-CM

## 2019-12-16 PROCEDURE — 96160 PT-FOCUSED HLTH RISK ASSMT: CPT | Performed by: INTERNAL MEDICINE

## 2019-12-16 PROCEDURE — 99397 PER PM REEVAL EST PAT 65+ YR: CPT | Performed by: INTERNAL MEDICINE

## 2019-12-16 PROCEDURE — G0439 PPPS, SUBSEQ VISIT: HCPCS | Performed by: INTERNAL MEDICINE

## 2019-12-16 NOTE — PROGRESS NOTES
QUICK REFERENCE INFORMATION:  The ABCs of the Annual Wellness Visit    Medicare Annual Wellness Visit    Subjective   History of Present Illness    Fazal Berg III is a 74 y.o. male who presents for an Annual Wellness Visit. In addition, we addressed the following health issues: feet hurt. Diabetic.        PMH, PSH, SocHx, FamHx, Allergies, and Medications: Reviewed and updated.     Outpatient Medications Prior to Visit   Medication Sig Dispense Refill   • ACCU-CHEK SOFTCLIX LANCETS lancets 1 each by Other route 3 (Three) Times a Day. Use as instructed 300 each 3   • albuterol (VENTOLIN HFA) 108 (90 BASE) MCG/ACT inhaler Ventolin  (90 Base) MCG/ACT Inhalation Aerosol Solution; Patient Sig: Ventolin  (90 Base) MCG/ACT Inhalation Aerosol Solution INHALE 1 TO 2 PUFFS EVERY 4 TO 6 HOURS AS NEEDED.; 1; 11; 18-Aug-2015; Active     • Alcohol Swabs (B-D SINGLE USE SWABS REGULAR) pads 1 swab 3 (three) times a day. 90 each 3   • aspirin 81 MG EC tablet Take 81 mg by mouth Daily.     • bisacodyl (bisacodyl) 5 MG EC tablet Take 1 tablet by mouth Daily. 4 tablet 0   • Blood Gluc Meter Disp-Strips device 1 strip 3 (Three) Times a Day. Patient needs Accu-chek meter and test strips. 1 each 0   • Blood Glucose Calibration (SURECHEK CONTROL SOLUTION) NORMAL liquid 1 bottle by In Vitro route every 30 (thirty) days. 3 each 3   • Blood Glucose Monitoring Suppl (ACCU-CHEK IDANIA PLUS) w/Device kit TEST THREE TIMES DAILY 1 kit 0   • Cholecalciferol (VITAMIN D) 2000 UNITS tablet Take 1 capsule by mouth 3 (Three) Times a Week.     • Chromium-Cinnamon (CINNAMON PLUS CHROMIUM) 100-500 MCG-MG capsule Take 2 capsules by mouth 2 (two) times a day.     • colesevelam (WELCHOL) 625 MG tablet Take 1 tablet by mouth 2 (Two) Times a Day. 180 tablet 3   • finasteride (PROSCAR) 5 MG tablet TAKE 1 TABLET EVERY DAY 90 tablet 2   • glimepiride (AMARYL) 4 MG tablet Take 1 tablet by mouth Daily. 90 tablet 3   • Glucosamine-Chondroit-Vit  C-Mn (GLUCOSAMINE 1500 COMPLEX PO) Take 1 capsule by mouth 2 (Two) Times a Day.     • glucose blood (ACCU-CHEK IDANIA) test strip 1 each by Other route 3 (Three) Times a Day. Use as instructed 300 each 3   • losartan-hydrochlorothiazide (HYZAAR) 50-12.5 MG per tablet Take 1 tablet by mouth Daily. 90 tablet 3   • metFORMIN (GLUCOPHAGE) 500 MG tablet Take 2 tablets by mouth 2 (Two) Times a Day With Meals. 180 tablet 11   • SITagliptin (JANUVIA) 100 MG tablet Take 1 tablet by mouth Daily. 28 tablet 0   • SITagliptin (JANUVIA) 100 MG tablet Take 1 tablet by mouth Daily. 56 tablet 0   • carvedilol (COREG) 25 MG tablet Take 1 tablet by mouth 2 (Two) Times a Day With Meals. 180 tablet 3     No facility-administered medications prior to visit.        Patient Active Problem List   Diagnosis   • Menstrual disorder   • Asthma with acute exacerbation   • Acute sinusitis   • Acute upper respiratory infection   • Asthma   • Benign prostatic hyperplasia   • Gastroesophageal reflux disease   • Hyperlipidemia   • Hypertension   • Obstructive sleep apnea syndrome   • Peripheral neuropathy   • Dyspnea on exertion   • Malignant neoplasm of skin   • Type 2 diabetes, controlled, with peripheral neuropathy (CMS/HCC)   • Cobalamin deficiency   • Vitamin D deficiency   • Skin lesion of face   • Arthritis   • Calculus of gallbladder with acute cholecystitis without obstruction   • Encounter for screening colonoscopy       Health Habits:  Dental Exam. up to date  Eye Exam. up to date  No exam data present  Exercise: 7 times/week.  Current exercise activities include: walking    Health Risk Assessment:  The patient has completed a Health Risk Assessment. This has been reviewed with them and has been scanned into the patient's chart.    Current Medical Providers:  Patient Care Team:  Matthew Carr MD as PCP - General  Eric Bran MD as Consulting Physician (General Surgery)  Georgina Muse MD (Dermatology)    [facility]  providers who are involved in the care of this patient are listed above. Additional providers and suppliers are listed below:  [facility] for eye exams.      Recent Hospitalizations:  No hospitalization(s) within the last year..    Recent Lab Results:  CMP:  Lab Results   Component Value Date     (H) 12/12/2019    BUN 17 12/12/2019    CREATININE 1.33 (H) 12/12/2019    EGFRIFNONA 53 (L) 12/12/2019    EGFRIFAFRI 64 12/12/2019    BCR 12.8 12/12/2019     12/12/2019    K 4.4 12/12/2019    CO2 27.8 12/12/2019    CALCIUM 10.1 12/12/2019    PROTENTOTREF 6.9 12/12/2019    ALBUMIN 4.70 12/12/2019    LABGLOBREF 2.2 12/12/2019    LABIL2 2.1 12/12/2019    BILITOT 0.7 12/12/2019    ALKPHOS 60 12/12/2019    AST 17 12/12/2019    ALT 15 12/12/2019       LIPID PANEL:  Lab Results   Component Value Date    CHLPL 195 12/12/2019    TRIG 283 (H) 12/12/2019    HDL 44 12/12/2019    VLDL 56.6 12/12/2019    LDL 94 12/12/2019       HbA1c:  Lab Results   Component Value Date    HGBA1C 6.80 (H) 08/19/2019       URINE MICROALBUMIN:  Lab Results   Component Value Date    MICROALBUR 4.1 08/19/2019       PSA:  Lab Results   Component Value Date    PSA 1.280 07/30/2019       Age-appropriate Screening Schedule:  Refer to the list below for future screening recommendations based on patient's age, sex and/or medical conditions. Orders for these recommended tests are listed in the plan section. The patient has been provided with a written plan.    Health Maintenance   Topic Date Due   • DIABETIC EYE EXAM  11/18/2019   • HEMOGLOBIN A1C  02/19/2020   • URINE MICROALBUMIN  08/19/2020   • DIABETIC FOOT EXAM  09/30/2020   • LIPID PANEL  12/12/2020   • TDAP/TD VACCINES (2 - Td) 02/09/2027   • COLONOSCOPY  08/08/2028   • INFLUENZA VACCINE  Completed   • PNEUMOCOCCAL VACCINES (65+ LOW/MEDIUM RISK)  Completed   • ZOSTER VACCINE  Completed       Depression Screen:   PHQ-2/PHQ-9 Depression Screening 12/16/2019   Little interest or pleasure in doing things  0   Feeling down, depressed, or hopeless 0   Total Score 0         Functional and Cognitive Screening:  Functional & Cognitive Status 12/16/2019   Do you have difficulty preparing food and eating? No   Do you have difficulty bathing yourself, getting dressed or grooming yourself? No   Do you have difficulty using the toilet? No   Do you have difficulty moving around from place to place? No   Do you have trouble with steps or getting out of a bed or a chair? Yes   Current Diet Limited Junk Food   Dental Exam Up to date   Eye Exam Up to date   Exercise (times per week) 0 times per week   Current Exercise Activities Include None   Do you need help using the phone?  No   Are you deaf or do you have serious difficulty hearing?  Yes   Do you need help with transportation? Yes   Do you need help shopping? No   Do you need help preparing meals?  No   Do you need help with housework?  No   Do you need help with laundry? No   Do you need help taking your medications? No   Do you need help managing money? No   Do you ever drive or ride in a car without wearing a seat belt? Yes   Have you felt unusual stress, anger or loneliness in the last month? -   Who do you live with? -   If you need help, do you have trouble finding someone available to you? -   Have you been bothered in the last four weeks by sexual problems? -   Do you have difficulty concentrating, remembering or making decisions? -       Does the patient have evidence of cognitive impairment? No    Advanced Care Planning:  Patient has an advance directive - a copy has been provided and is visible in patient header    Identification of Risk Factors:  Risk factors include: Chronic Pain .  7/10.       Review of Systems   Constitutional: Positive for fatigue.   Eyes: Negative for photophobia and visual disturbance.   Cardiovascular: Negative for chest pain, palpitations and leg swelling.   Gastrointestinal: Negative for abdominal distention, abdominal pain, anal bleeding,  "blood in stool and diarrhea.   Musculoskeletal: Negative for gait problem and joint swelling.   Neurological: Negative for tremors, syncope and speech difficulty.   Psychiatric/Behavioral: Negative for self-injury and sleep disturbance. The patient is not nervous/anxious.    All other systems reviewed and are negative.      Compared to one year ago, the patient feels his physical health is better.  Compared to one year ago, the patient feels his mental health is the same.    Objective     Physical Exam    Vitals:    12/16/19 0907   BP: 145/65   BP Location: Left arm   Patient Position: Sitting   Cuff Size: Adult   Pulse: 75   Resp: 16   Temp: 98.2 °F (36.8 °C)   TempSrc: Oral   SpO2: 98%   Weight: 101 kg (222 lb)   Height: 182.9 cm (72\")       Body mass index is 30.11 kg/m².  Discussed the patient's BMI with him. The BMI is in the acceptable range.    Assessment/Plan   Patient Self-Management and Personalized Health Advice  The patient has been provided with information about: diet, exercise and weight management and preventive services including:   · Annual Wellness Visit (AWV).    Visit Diagnoses:    ICD-10-CM ICD-9-CM   1. Diabetes mellitus without complication (CMS/HCC) E11.9 250.00   2. Essential hypertension I10 401.9   3. Encounter for screening for malignant neoplasm of prostate  Z12.5 V76.44       Orders Placed This Encounter   Procedures   • TSH   • Lipid Panel   • Vitamin B12   • Comprehensive Metabolic Panel   • PSA Screen   • Hemoglobin A1c   • MicroAlbumin, Urine, Random - Urine, Clean Catch   • Ambulatory Referral to Optometry     Referral Priority:   Routine     Referral Type:   Consultation     Referral Reason:   Specialty Services Required     Referred to Provider:   Vargas Evans, ASHOK     Requested Specialty:   Optometry     Number of Visits Requested:   1   • CBC & Differential     Order Specific Question:   Manual Differential     Answer:   No       Outpatient Encounter Medications as of " 12/16/2019   Medication Sig Dispense Refill   • ACCU-CHEK SOFTCLIX LANCETS lancets 1 each by Other route 3 (Three) Times a Day. Use as instructed 300 each 3   • albuterol (VENTOLIN HFA) 108 (90 BASE) MCG/ACT inhaler Ventolin  (90 Base) MCG/ACT Inhalation Aerosol Solution; Patient Sig: Ventolin  (90 Base) MCG/ACT Inhalation Aerosol Solution INHALE 1 TO 2 PUFFS EVERY 4 TO 6 HOURS AS NEEDED.; 1; 11; 18-Aug-2015; Active     • Alcohol Swabs (B-D SINGLE USE SWABS REGULAR) pads 1 swab 3 (three) times a day. 90 each 3   • aspirin 81 MG EC tablet Take 81 mg by mouth Daily.     • bisacodyl (bisacodyl) 5 MG EC tablet Take 1 tablet by mouth Daily. 4 tablet 0   • Blood Gluc Meter Disp-Strips device 1 strip 3 (Three) Times a Day. Patient needs Accu-chek meter and test strips. 1 each 0   • Blood Glucose Calibration (SURECHEK CONTROL SOLUTION) NORMAL liquid 1 bottle by In Vitro route every 30 (thirty) days. 3 each 3   • Blood Glucose Monitoring Suppl (ACCU-CHEK IDANIA PLUS) w/Device kit TEST THREE TIMES DAILY 1 kit 0   • Cholecalciferol (VITAMIN D) 2000 UNITS tablet Take 1 capsule by mouth 3 (Three) Times a Week.     • Chromium-Cinnamon (CINNAMON PLUS CHROMIUM) 100-500 MCG-MG capsule Take 2 capsules by mouth 2 (two) times a day.     • colesevelam (WELCHOL) 625 MG tablet Take 1 tablet by mouth 2 (Two) Times a Day. 180 tablet 3   • finasteride (PROSCAR) 5 MG tablet TAKE 1 TABLET EVERY DAY 90 tablet 2   • glimepiride (AMARYL) 4 MG tablet Take 1 tablet by mouth Daily. 90 tablet 3   • Glucosamine-Chondroit-Vit C-Mn (GLUCOSAMINE 1500 COMPLEX PO) Take 1 capsule by mouth 2 (Two) Times a Day.     • glucose blood (ACCU-CHEK IDANIA) test strip 1 each by Other route 3 (Three) Times a Day. Use as instructed 300 each 3   • losartan-hydrochlorothiazide (HYZAAR) 50-12.5 MG per tablet Take 1 tablet by mouth Daily. 90 tablet 3   • metFORMIN (GLUCOPHAGE) 500 MG tablet Take 2 tablets by mouth 2 (Two) Times a Day With Meals. 180 tablet 11    • SITagliptin (JANUVIA) 100 MG tablet Take 1 tablet by mouth Daily. 28 tablet 0   • SITagliptin (JANUVIA) 100 MG tablet Take 1 tablet by mouth Daily. 56 tablet 0   • [DISCONTINUED] carvedilol (COREG) 25 MG tablet Take 1 tablet by mouth 2 (Two) Times a Day With Meals. 180 tablet 3     No facility-administered encounter medications on file as of 12/16/2019.        Reviewed use of high risk medication in the elderly: yes  Reviewed for potential of harmful drug interactions in the elderly: yes    Follow Up:  Return in about 6 months (around 6/16/2020).     An After Visit Summary and PPPS with all of these plans were given to the patient.             Fazal was seen today for medicare wellness-subsequent.    Diagnoses and all orders for this visit:    Diabetes mellitus without complication (CMS/[facility])  -     Ambulatory Referral to Optometry  -     TSH  -     Lipid Panel  -     CBC & Differential  -     Vitamin B12  -     Comprehensive Metabolic Panel  -     PSA Screen  -     Hemoglobin A1c  -     MicroAlbumin, Urine, Random - Urine, Clean Catch    Essential hypertension  -     TSH  -     Lipid Panel  -     CBC & Differential  -     Vitamin B12  -     Comprehensive Metabolic Panel  -     PSA Screen  -     Hemoglobin A1c  -     MicroAlbumin, Urine, Random - Urine, Clean Catch    Encounter for screening for malignant neoplasm of prostate   -     PSA Screen    labs discussed.    ldl under 100.

## 2019-12-16 NOTE — ADDENDUM NOTE
Addended by: MONIQUE KHAN on: 12/16/2019 09:32 AM     Modules accepted: Orders, Level of Service

## 2020-01-16 DIAGNOSIS — E11.42 TYPE 2 DIABETES, CONTROLLED, WITH PERIPHERAL NEUROPATHY (HCC): ICD-10-CM

## 2020-02-12 DIAGNOSIS — C61 PROSTATE CANCER (HCC): Primary | ICD-10-CM

## 2020-02-14 LAB — PSA SERPL-MCNC: 1.39 NG/ML (ref 0–4)

## 2020-02-18 ENCOUNTER — OFFICE VISIT (OUTPATIENT)
Dept: UROLOGY | Facility: CLINIC | Age: 75
End: 2020-02-18

## 2020-02-18 VITALS
SYSTOLIC BLOOD PRESSURE: 125 MMHG | HEIGHT: 72 IN | BODY MASS INDEX: 30.11 KG/M2 | OXYGEN SATURATION: 96 % | HEART RATE: 90 BPM | TEMPERATURE: 98.3 F | DIASTOLIC BLOOD PRESSURE: 84 MMHG

## 2020-02-18 DIAGNOSIS — C61 PROSTATE CANCER (HCC): Primary | ICD-10-CM

## 2020-02-18 DIAGNOSIS — N40.1 BENIGN NON-NODULAR PROSTATIC HYPERPLASIA WITH LOWER URINARY TRACT SYMPTOMS: ICD-10-CM

## 2020-02-18 LAB
BILIRUB BLD-MCNC: NEGATIVE MG/DL
CLARITY, POC: CLEAR
COLOR UR: YELLOW
GLUCOSE UR STRIP-MCNC: NEGATIVE MG/DL
KETONES UR QL: NEGATIVE
LEUKOCYTE EST, POC: NEGATIVE
NITRITE UR-MCNC: NEGATIVE MG/ML
PH UR: 5.5 [PH] (ref 5–8)
PROT UR STRIP-MCNC: NEGATIVE MG/DL
RBC # UR STRIP: NEGATIVE /UL
SP GR UR: 1.03 (ref 1–1.03)
UROBILINOGEN UR QL: NORMAL

## 2020-02-18 PROCEDURE — 81003 URINALYSIS AUTO W/O SCOPE: CPT | Performed by: UROLOGY

## 2020-02-18 PROCEDURE — 99213 OFFICE O/P EST LOW 20 MIN: CPT | Performed by: UROLOGY

## 2020-02-18 RX ORDER — FINASTERIDE 5 MG/1
5 TABLET, FILM COATED ORAL DAILY
Qty: 90 TABLET | Refills: 3 | Status: SHIPPED | OUTPATIENT
Start: 2020-02-18 | End: 2021-04-01

## 2020-02-18 NOTE — PROGRESS NOTES
"Chief Complaint  Prostate Cancer (PSA 1.390)        TIFFANY Berg is a 74 y.o. male who returns today for follow-up of his prostate with known prostate cancer found on biopsy years ago but since it was low-grade and early stage has been managed with a program of active surveillance.  He is never had palpable disease on digital rectal exam and his PSA is consistently less than 1.5.    Vitals:    02/18/20 0907   BP: 125/84   Pulse: 90   Temp: 98.3 °F (36.8 °C)   SpO2: 96%       Past Medical History  Past Medical History:   Diagnosis Date   • Acute asthma exacerbation    • Acute sinusitis    • Acute URI    • Allergic rhinitis    • Anemia    • Arthritis    • Asthma    • Benign prostatic hypertrophy    • Bronchitis    • Chest pain     \"I'VE SEEN DR. CARRASCO FOR IT A LONG TIME AGO, AND THAT'S WHEN HE DID TESTING\".  STATES HAS NOT MENTIONED IT TO DR. KHAN.  STATES UNSURE OF WHEN LAST TIME WAS\".  PT STATES \"I'M NOT A COMPLAINER\".  STATES COLD WEATHER AND WALKING BRING IT ON.  GETS SHORTNESS OF BREATH OCCASSIONALLY.  MORE DESCRIBED AS PRESSURE.    • ED (erectile dysfunction)    • Elevated cholesterol    • Family history of deafness and hearing loss    • Full dentures     INSTRUCTED NO ADHESIVES THE DOS   • GERD (gastroesophageal reflux disease)    • H/O echocardiogram 2003   • Hiatal hernia    • History of fracture     REPORTS TOE ON RIGHT FOOT - NO SURGICAL INTERVENTION REQUIRED   • Eek (hard of hearing)     SPOUSE REPORTS NO USE OF HEARING AIDS   • Hx of exercise stress test     SPOUSE REPORTS LAST DONE BY DR ALMANZA AROUND JUNE 2018 AND REPORTED ALL WNL'S AND MD RELEASED PATIENT   • Hyperlipidemia    • Hypertension    • Jaundice     AS A TEEN    • Obstructive sleep apnea     NO CPAP - SPOUSE REPORTS \"HE WOULDN'T DO IT\"   • Peripheral neuropathy    • Pneumonia     SPOUSE REPORTS HISTORY   • Prostate cancer (CMS/HCC)    • Short of breath on exertion    • Skin cancer    • Type 2 diabetes mellitus (CMS/HCC)    • Urinary " "frequency    • Vitamin B 12 deficiency    • Vitamin D deficiency    • Wears glasses        Past Surgical History  Past Surgical History:   Procedure Laterality Date   • APPENDECTOMY     • CARDIAC CATHETERIZATION  2003?    \"IT WAS OK\"   • CHOLECYSTECTOMY  06/2018   • CHOLECYSTECTOMY WITH INTRAOPERATIVE CHOLANGIOGRAM N/A 5/15/2018    Procedure: CHOLECYSTECTOMY LAPAROSCOPIC INTRAOPERATIVE CHOLANGIOGRAM;  Surgeon: Eric Bran MD;  Location: [facility] OR;  Service: General   • COLONOSCOPY     • COLONOSCOPY N/A 8/8/2018    Procedure: COLONOSCOPY;  Surgeon: Eric Bran MD;  Location: [facility] ENDOSCOPY;  Service: Gastroenterology   • ENDOSCOPY     • HAND SURGERY Left    • KNEE ARTHROSCOPY Left    • MOUTH SURGERY      FULL MOUTH EXTRACTION   • SKIN CANCER EXCISION  07/22/2019    skin cancer on head       Medications  has a current medication list which includes the following prescription(s): accu-chek softclix lancets, albuterol sulfate hfa, b-d single use swabs regular, aspirin, bisacodyl, blood gluc meter disp-strips, surechek control solution, accu-chek jose m plus, vitamin d, chromium-cinnamon, colesevelam, finasteride, glimepiride, glucosamine-chondroit-vit c-mn, glucose blood, losartan-hydrochlorothiazide, metformin, sitagliptin, and sitagliptin.      Allergies  Allergies   Allergen Reactions   • Statins Myalgia       Social History  Social History     Socioeconomic History   • Marital status:      Spouse name: Not on file   • Number of children: Not on file   • Years of education: Not on file   • Highest education level: Not on file   Occupational History     Employer: RETIRED   Tobacco Use   • Smoking status: Never Smoker   • Smokeless tobacco: Never Used   Substance and Sexual Activity   • Alcohol use: No   • Drug use: No   • Sexual activity: Defer       Family History  He has no family history of bladder or kidney cancer  He has no family history of kidney stones      AUA Symptom Score:      Review " of Systems  Review of Systems   Constitutional: Negative for activity change, appetite change, chills, fatigue, fever, unexpected weight gain and unexpected weight loss.   Respiratory: Negative for apnea, cough, chest tightness, shortness of breath, wheezing and stridor.    Cardiovascular: Negative for chest pain, palpitations and leg swelling.   Gastrointestinal: Negative for abdominal distention, abdominal pain, anal bleeding, blood in stool, constipation, diarrhea, nausea, rectal pain, vomiting, GERD and indigestion.   Genitourinary: Negative for decreased libido, decreased urine volume, difficulty urinating, discharge, dysuria, flank pain, frequency, genital sores, hematuria, nocturia, penile pain, erectile dysfunction, penile swelling, scrotal swelling, testicular pain, urgency and urinary incontinence.   Musculoskeletal: Negative for back pain and joint swelling.   Neurological: Negative for tremors, seizures, speech difficulty, weakness and numbness.   Psychiatric/Behavioral: Negative for agitation, decreased concentration, sleep disturbance, depressed mood and stress. The patient is not nervous/anxious.        Physical Exam  Physical Exam   Constitutional: He is oriented to person, place, and time. He appears well-developed and well-nourished.   HENT:   Head: Normocephalic and atraumatic.   Neck: Normal range of motion.   Pulmonary/Chest: Effort normal. No respiratory distress.   Abdominal: Soft. He exhibits no distension and no mass. There is no tenderness. No hernia.   Genitourinary: Rectum normal and prostate normal.   Musculoskeletal: Normal range of motion.   Lymphadenopathy:     He has no cervical adenopathy.   Neurological: He is alert and oriented to person, place, and time.   Skin: Skin is warm and dry.   Psychiatric: He has a normal mood and affect. His behavior is normal.   Vitals reviewed.      Labs Recent and today in the office:  Results for orders placed or performed in visit on 02/18/20   POC  Urinalysis Dipstick, Automated   Result Value Ref Range    Color Yellow Yellow, Straw, Dark Yellow, Mary    Clarity, UA Clear Clear    Specific Gravity  1.030 1.005 - 1.030    pH, Urine 5.5 5.0 - 8.0    Leukocytes Negative Negative    Nitrite, UA Negative Negative    Protein, POC Negative Negative mg/dL    Glucose, UA Negative Negative, 1000 mg/dL (3+) mg/dL    Ketones, UA Negative Negative    Urobilinogen, UA Normal Normal    Bilirubin Negative Negative    Blood, UA Negative Negative         Assessment & Plan  Prostate cancer: Diagnosed years ago with low-grade early stage disease and managed on a program of active surveillance.  Digital rectal exam is benign and his PSA is stable at less than 1.5 on Proscar.  He will continue the medicine and return in 6 months.

## 2020-02-25 ENCOUNTER — TELEPHONE (OUTPATIENT)
Dept: INTERNAL MEDICINE | Facility: CLINIC | Age: 75
End: 2020-02-25

## 2020-02-25 DIAGNOSIS — E11.42 TYPE 2 DIABETES, CONTROLLED, WITH PERIPHERAL NEUROPATHY (HCC): ICD-10-CM

## 2020-02-25 NOTE — TELEPHONE ENCOUNTER
Called patient with no answer and no way to leave message, called patient's wife and left voicemail about sending prescription to the pharmacy. Informed patient's wife if the prescription is too expensive to give us a call back.

## 2020-02-25 NOTE — TELEPHONE ENCOUNTER
PATIENT IS ALMOST OUT OF [place]Y[facility] CAME TO GET SAMPLES BUT INFORMED OF OUR NEW POLICY IS LEAVING FOR HI IN 3 WEEKS AND SAID HE JUST WANTED A RX THANKS PLEASE CALL WITH UPDATE.

## 2020-02-25 NOTE — TELEPHONE ENCOUNTER
PT CALLED IN STATED HE NEEDED THE RX FOR HIS SITagliptin (JANUVIA) 100 MG tablet  SENT TO THE [facility] MAIL ALSO PT WOULD LIKE A 90DY SUPPLYPLEASE RESUBMIT TO THIS PHARM      PT CB NUMBER: [phone]  [facility]  FAX# [phone]

## 2020-04-13 RX ORDER — GLIMEPIRIDE 4 MG/1
4 TABLET ORAL DAILY
Qty: 90 TABLET | Refills: 3 | Status: SHIPPED | OUTPATIENT
Start: 2020-04-13 | End: 2021-07-28

## 2020-04-13 RX ORDER — LOSARTAN POTASSIUM AND HYDROCHLOROTHIAZIDE 12.5; 5 MG/1; MG/1
1 TABLET ORAL DAILY
Qty: 90 TABLET | Refills: 3 | Status: SHIPPED | OUTPATIENT
Start: 2020-04-13 | End: 2021-04-01

## 2020-04-13 NOTE — TELEPHONE ENCOUNTER
PATIENT HAS CALLED TO REQUEST A REFILL ON THE FOLLOWING MEDICATIONS:  glimepiride (AMARYL) 4 MG tablet  losartan-hydrochlorothiazide (HYZAAR) 50-12.5 MG per tablet  HE WOULD LIKE THESE SENT THROUGH [email or web address] MAIL ORDER.   PLEASE CALL PATIENT FOR ANY QUESTIONS OR CONCERNS AT   [phone]

## 2020-05-19 ENCOUNTER — TELEPHONE (OUTPATIENT)
Dept: SURGERY | Facility: CLINIC | Age: 75
End: 2020-05-19

## 2020-05-19 NOTE — TELEPHONE ENCOUNTER
Patient no showed for appointment . Called patient no answer, voice mail full. No show letter mailed to patient.

## 2020-06-09 NOTE — PROGRESS NOTES
"Patient: Fazal Berg III  YOB: 1945    Date: 06/11/2020    Primary Care Provider: Matthew Carr MD    Chief Complaint   Patient presents with   • Skin Lesion       History:    Patient is here 9 months in follow-up after his scalp excision for squamous cell carcinoma.  He states that he has not noted any changes in this area.  He has had some recent discomfort bilaterally in the skin in the upper cheek that he wants to be evaluated for.       The following portions of the patient's history were reviewed and updated as appropriate: allergies, current medications, past family history, past medical history, past social history, past surgical history and problem list.    Review of Systems   Constitutional: Negative for chills, fever and unexpected weight change.   HENT: Negative for trouble swallowing and voice change.    Eyes: Negative for visual disturbance.   Respiratory: Negative for apnea, cough, chest tightness, shortness of breath and wheezing.    Cardiovascular: Negative for chest pain, palpitations and leg swelling.   Gastrointestinal: Negative for abdominal distention, abdominal pain, anal bleeding, blood in stool, constipation, diarrhea, nausea, rectal pain and vomiting.   Endocrine: Negative for cold intolerance and heat intolerance.   Genitourinary: Negative for difficulty urinating, dysuria, flank pain, scrotal swelling and testicular pain.   Musculoskeletal: Negative for back pain, gait problem and joint swelling.   Skin: Negative for color change, rash and wound.   Neurological: Negative for dizziness, syncope, speech difficulty, weakness, numbness and headaches.   Hematological: Negative for adenopathy. Does not bruise/bleed easily.   Psychiatric/Behavioral: Negative for confusion. The patient is not nervous/anxious.        Vital Signs  Vitals:    06/11/20 1446   BP: 124/62   Pulse: 69   Temp: 97.3 °F (36.3 °C)   SpO2: 98%   Weight: 96.2 kg (212 lb)   Height: 182.9 cm (72\") "       Allergies:  Allergies   Allergen Reactions   • Statins Myalgia       Medications:    Current Outpatient Medications:   •  ACCU-CHEK SOFTCLIX LANCETS lancets, 1 each by Other route 3 (Three) Times a Day. Use as instructed, Disp: 300 each, Rfl: 3  •  albuterol (VENTOLIN HFA) 108 (90 BASE) MCG/ACT inhaler, Ventolin  (90 Base) MCG/ACT Inhalation Aerosol Solution; Patient Sig: Ventolin  (90 Base) MCG/ACT Inhalation Aerosol Solution INHALE 1 TO 2 PUFFS EVERY 4 TO 6 HOURS AS NEEDED.; 1; 11; 18-Aug-2015; Active, Disp: , Rfl:   •  Alcohol Swabs (B-D SINGLE USE SWABS REGULAR) pads, 1 swab 3 (three) times a day., Disp: 90 each, Rfl: 3  •  aspirin 81 MG EC tablet, Take 81 mg by mouth Daily., Disp: , Rfl:   •  bisacodyl (bisacodyl) 5 MG EC tablet, Take 1 tablet by mouth Daily., Disp: 4 tablet, Rfl: 0  •  Blood Gluc Meter Disp-Strips device, 1 strip 3 (Three) Times a Day. Patient needs Accu-chek meter and test strips., Disp: 1 each, Rfl: 0  •  Blood Glucose Calibration (SURECHEK CONTROL SOLUTION) NORMAL liquid, 1 bottle by In Vitro route every 30 (thirty) days., Disp: 3 each, Rfl: 3  •  Blood Glucose Monitoring Suppl (ACCU-CHEK IDANIA PLUS) w/Device kit, TEST THREE TIMES DAILY, Disp: 1 kit, Rfl: 0  •  Cholecalciferol (VITAMIN D) 2000 UNITS tablet, Take 1 capsule by mouth 3 (Three) Times a Week., Disp: , Rfl:   •  Chromium-Cinnamon (CINNAMON PLUS CHROMIUM) 100-500 MCG-MG capsule, Take 2 capsules by mouth 2 (two) times a day., Disp: , Rfl:   •  colesevelam (WELCHOL) 625 MG tablet, Take 1 tablet by mouth 2 (Two) Times a Day., Disp: 180 tablet, Rfl: 3  •  finasteride (PROSCAR) 5 MG tablet, Take 1 tablet by mouth Daily., Disp: 90 tablet, Rfl: 3  •  glimepiride (AMARYL) 4 MG tablet, Take 1 tablet by mouth Daily., Disp: 90 tablet, Rfl: 3  •  Glucosamine-Chondroit-Vit C-Mn (GLUCOSAMINE 1500 COMPLEX PO), Take 1 capsule by mouth 2 (Two) Times a Day., Disp: , Rfl:   •  glucose blood (ACCU-CHEK IDANIA) test strip, 1 each by  Other route 3 (Three) Times a Day. Use as instructed, Disp: 300 each, Rfl: 3  •  losartan-hydrochlorothiazide (Hyzaar) 50-12.5 MG per tablet, Take 1 tablet by mouth Daily., Disp: 90 tablet, Rfl: 3  •  metFORMIN (GLUCOPHAGE) 500 MG tablet, Take 2 tablets by mouth 2 (Two) Times a Day With Meals., Disp: 180 tablet, Rfl: 11  •  SITagliptin (JANUVIA) 100 MG tablet, Take 1 tablet by mouth Daily., Disp: 90 tablet, Rfl: 1  •  SITagliptin (JANUVIA) 100 MG tablet, Take 1 tablet by mouth Daily., Disp: 90 tablet, Rfl: 2    Physical Exam:   General Appearance:   Alert and oriented x3    Heart:   Regular rate and rhythm   Skin:  No evidence of recurrence in the scalp excision site.  Nicely healed.  No obvious significant abnormality in the area of concern bilaterally in the upper cheeks.  No obvious evidence of malignancy in any of these areas.     Results Review:   None     Review of Systems was reviewed and confirmed as accurate as documented by the MA.    ASSESSMENT/PLAN:    1. Malignant neoplasm of skin       No evidence of recurrence in the scalp.  I strongly recommend to the patient that he follows up with dermatology for continued skin checks.  He is agreeable.  Follow-up with me as needed.    Electronically signed by Eric Bran MD  06/11/20

## 2020-06-11 ENCOUNTER — OFFICE VISIT (OUTPATIENT)
Dept: SURGERY | Facility: CLINIC | Age: 75
End: 2020-06-11

## 2020-06-11 VITALS
WEIGHT: 212 LBS | DIASTOLIC BLOOD PRESSURE: 62 MMHG | HEART RATE: 69 BPM | TEMPERATURE: 97.3 F | HEIGHT: 72 IN | BODY MASS INDEX: 28.71 KG/M2 | SYSTOLIC BLOOD PRESSURE: 124 MMHG | OXYGEN SATURATION: 98 %

## 2020-06-11 DIAGNOSIS — C44.90 MALIGNANT NEOPLASM OF SKIN: Primary | ICD-10-CM

## 2020-06-11 PROCEDURE — 99213 OFFICE O/P EST LOW 20 MIN: CPT | Performed by: SURGERY

## 2020-06-24 LAB
ALBUMIN SERPL-MCNC: 4.4 G/DL (ref 3.5–5.2)
ALBUMIN/GLOB SERPL: 1.8 G/DL
ALP SERPL-CCNC: 69 U/L (ref 39–117)
ALT SERPL-CCNC: 16 U/L (ref 1–41)
AST SERPL-CCNC: 13 U/L (ref 1–40)
BASOPHILS # BLD AUTO: 0.06 10*3/MM3 (ref 0–0.2)
BASOPHILS NFR BLD AUTO: 1 % (ref 0–1.5)
BILIRUB SERPL-MCNC: 0.4 MG/DL (ref 0.2–1.2)
BUN SERPL-MCNC: 22 MG/DL (ref 8–23)
BUN/CREAT SERPL: 16.7 (ref 7–25)
CALCIUM SERPL-MCNC: 10 MG/DL (ref 8.6–10.5)
CHLORIDE SERPL-SCNC: 102 MMOL/L (ref 98–107)
CHOLEST SERPL-MCNC: 198 MG/DL (ref 0–200)
CO2 SERPL-SCNC: 27.7 MMOL/L (ref 22–29)
CREAT SERPL-MCNC: 1.32 MG/DL (ref 0.76–1.27)
EOSINOPHIL # BLD AUTO: 0.27 10*3/MM3 (ref 0–0.4)
EOSINOPHIL NFR BLD AUTO: 4.4 % (ref 0.3–6.2)
ERYTHROCYTE [DISTWIDTH] IN BLOOD BY AUTOMATED COUNT: 12.2 % (ref 12.3–15.4)
GLOBULIN SER CALC-MCNC: 2.4 GM/DL
GLUCOSE SERPL-MCNC: 190 MG/DL (ref 65–99)
HBA1C MFR BLD: 7.1 % (ref 4.8–5.6)
HCT VFR BLD AUTO: 40.9 % (ref 37.5–51)
HDLC SERPL-MCNC: 41 MG/DL (ref 40–60)
HGB BLD-MCNC: 13.8 G/DL (ref 13–17.7)
IMM GRANULOCYTES # BLD AUTO: 0.03 10*3/MM3 (ref 0–0.05)
IMM GRANULOCYTES NFR BLD AUTO: 0.5 % (ref 0–0.5)
LDLC SERPL CALC-MCNC: 82 MG/DL (ref 0–100)
LYMPHOCYTES # BLD AUTO: 2.18 10*3/MM3 (ref 0.7–3.1)
LYMPHOCYTES NFR BLD AUTO: 35.9 % (ref 19.6–45.3)
MCH RBC QN AUTO: 31.8 PG (ref 26.6–33)
MCHC RBC AUTO-ENTMCNC: 33.7 G/DL (ref 31.5–35.7)
MCV RBC AUTO: 94.2 FL (ref 79–97)
MICROALBUMIN UR-MCNC: 7.2 UG/ML
MONOCYTES # BLD AUTO: 0.61 10*3/MM3 (ref 0.1–0.9)
MONOCYTES NFR BLD AUTO: 10 % (ref 5–12)
NEUTROPHILS # BLD AUTO: 2.92 10*3/MM3 (ref 1.7–7)
NEUTROPHILS NFR BLD AUTO: 48.2 % (ref 42.7–76)
NRBC BLD AUTO-RTO: 0 /100 WBC (ref 0–0.2)
PLATELET # BLD AUTO: 209 10*3/MM3 (ref 140–450)
POTASSIUM SERPL-SCNC: 4.6 MMOL/L (ref 3.5–5.2)
PROT SERPL-MCNC: 6.8 G/DL (ref 6–8.5)
PSA SERPL-MCNC: 1.08 NG/ML (ref 0–4)
RBC # BLD AUTO: 4.34 10*6/MM3 (ref 4.14–5.8)
SODIUM SERPL-SCNC: 138 MMOL/L (ref 136–145)
TRIGL SERPL-MCNC: 374 MG/DL (ref 0–150)
TSH SERPL DL<=0.005 MIU/L-ACNC: 1.67 UIU/ML (ref 0.27–4.2)
VIT B12 SERPL-MCNC: 303 PG/ML (ref 211–946)
VLDLC SERPL CALC-MCNC: 74.8 MG/DL
WBC # BLD AUTO: 6.07 10*3/MM3 (ref 3.4–10.8)

## 2020-07-01 ENCOUNTER — OFFICE VISIT (OUTPATIENT)
Dept: INTERNAL MEDICINE | Facility: CLINIC | Age: 75
End: 2020-07-01

## 2020-07-01 VITALS
HEART RATE: 72 BPM | RESPIRATION RATE: 16 BRPM | TEMPERATURE: 97.8 F | DIASTOLIC BLOOD PRESSURE: 61 MMHG | WEIGHT: 212.8 LBS | BODY MASS INDEX: 28.82 KG/M2 | HEIGHT: 72 IN | SYSTOLIC BLOOD PRESSURE: 150 MMHG | OXYGEN SATURATION: 98 %

## 2020-07-01 DIAGNOSIS — Z12.5 ENCOUNTER FOR SCREENING FOR MALIGNANT NEOPLASM OF PROSTATE: ICD-10-CM

## 2020-07-01 DIAGNOSIS — E11.42 TYPE 2 DIABETES, CONTROLLED, WITH PERIPHERAL NEUROPATHY (HCC): ICD-10-CM

## 2020-07-01 DIAGNOSIS — E11.9 TYPE 2 DIABETES MELLITUS WITHOUT COMPLICATION, WITHOUT LONG-TERM CURRENT USE OF INSULIN (HCC): Primary | ICD-10-CM

## 2020-07-01 DIAGNOSIS — I10 HYPERTENSION, UNSPECIFIED TYPE: ICD-10-CM

## 2020-07-01 PROCEDURE — 99214 OFFICE O/P EST MOD 30 MIN: CPT | Performed by: INTERNAL MEDICINE

## 2020-07-01 NOTE — PROGRESS NOTES
Subjective     Patient ID: Fazal Berg III is a 75 y.o. male. Patient is here for management of multiple medical problems.     Chief Complaint   Patient presents with   • Diabetes     6 month follow-up     History of Present Illness     DM bs runnign good if on diet. Not on diet all the time.     Htn.  Hasn't had meds yet.        The following portions of the patient's history were reviewed and updated as appropriate: allergies, current medications, past family history, past medical history, past social history, past surgical history and problem list.    Review of Systems   Constitutional: Negative for diaphoresis and fatigue.   HENT: Negative for dental problem, ear discharge, facial swelling, mouth sores and postnasal drip.    Gastrointestinal: Negative for abdominal distention and anal bleeding.   Psychiatric/Behavioral: The patient is not nervous/anxious.    All other systems reviewed and are negative.      Current Outpatient Medications:   •  ACCU-CHEK SOFTCLIX LANCETS lancets, 1 each by Other route 3 (Three) Times a Day. Use as instructed, Disp: 300 each, Rfl: 3  •  albuterol (VENTOLIN HFA) 108 (90 BASE) MCG/ACT inhaler, Ventolin  (90 Base) MCG/ACT Inhalation Aerosol Solution; Patient Sig: Ventolin  (90 Base) MCG/ACT Inhalation Aerosol Solution INHALE 1 TO 2 PUFFS EVERY 4 TO 6 HOURS AS NEEDED.; 1; 11; 18-Aug-2015; Active, Disp: , Rfl:   •  Alcohol Swabs (B-D SINGLE USE SWABS REGULAR) pads, 1 swab 3 (three) times a day., Disp: 90 each, Rfl: 3  •  aspirin 81 MG EC tablet, Take 81 mg by mouth Daily., Disp: , Rfl:   •  bisacodyl (bisacodyl) 5 MG EC tablet, Take 1 tablet by mouth Daily., Disp: 4 tablet, Rfl: 0  •  Blood Gluc Meter Disp-Strips device, 1 strip 3 (Three) Times a Day. Patient needs Accu-chek meter and test strips., Disp: 1 each, Rfl: 0  •  Blood Glucose Calibration (SURECHEK CONTROL SOLUTION) NORMAL liquid, 1 bottle by In Vitro route every 30 (thirty) days., Disp: 3 each, Rfl: 3  •   "Blood Glucose Monitoring Suppl (ACCU-CHEK IDANIA PLUS) w/Device kit, TEST THREE TIMES DAILY, Disp: 1 kit, Rfl: 0  •  Cholecalciferol (VITAMIN D) 2000 UNITS tablet, Take 1 capsule by mouth 3 (Three) Times a Week., Disp: , Rfl:   •  Chromium-Cinnamon (CINNAMON PLUS CHROMIUM) 100-500 MCG-MG capsule, Take 2 capsules by mouth 2 (two) times a day., Disp: , Rfl:   •  colesevelam (WELCHOL) 625 MG tablet, Take 1 tablet by mouth 2 (Two) Times a Day., Disp: 180 tablet, Rfl: 3  •  finasteride (PROSCAR) 5 MG tablet, Take 1 tablet by mouth Daily., Disp: 90 tablet, Rfl: 3  •  glimepiride (AMARYL) 4 MG tablet, Take 1 tablet by mouth Daily., Disp: 90 tablet, Rfl: 3  •  Glucosamine-Chondroit-Vit C-Mn (GLUCOSAMINE 1500 COMPLEX PO), Take 1 capsule by mouth 2 (Two) Times a Day., Disp: , Rfl:   •  glucose blood (ACCU-CHEK IDANIA) test strip, 1 each by Other route 3 (Three) Times a Day. Use as instructed, Disp: 300 each, Rfl: 3  •  losartan-hydrochlorothiazide (Hyzaar) 50-12.5 MG per tablet, Take 1 tablet by mouth Daily., Disp: 90 tablet, Rfl: 3  •  metFORMIN (GLUCOPHAGE) 500 MG tablet, Take 2 tablets by mouth 2 (Two) Times a Day With Meals., Disp: 180 tablet, Rfl: 11  •  SITagliptin (JANUVIA) 100 MG tablet, Take 1 tablet by mouth Daily., Disp: 90 tablet, Rfl: 2    Objective      Blood pressure 150/61, pulse 72, temperature 97.8 °F (36.6 °C), temperature source Temporal, resp. rate 16, height 182.9 cm (72\"), weight 96.5 kg (212 lb 12.8 oz), SpO2 98 %.    Physical Exam     General Appearance:    Alert, cooperative, no distress, appears stated age   Head:    Normocephalic, without obvious abnormality, atraumatic   Eyes:    PERRL, conjunctiva/corneas clear, EOM's intact   Ears:    Normal TM's and external ear canals, both ears   Nose:   Nares normal, septum midline, mucosa normal, no drainage   or sinus tenderness   Throat:   Lips, mucosa, and tongue normal; teeth and gums normal   Neck:   Supple, symmetrical, trachea midline, no adenopathy;   "      thyroid:  No enlargement/tenderness/nodules; no carotid    bruit or JVD   Back:     Symmetric, no curvature, ROM normal, no CVA tenderness   Lungs:     Clear to auscultation bilaterally, respirations unlabored   Chest wall:    No tenderness or deformity   Heart:    Regular rate and rhythm, S1 and S2 normal, no murmur,        rub or gallop   Abdomen:     Soft, non-tender, bowel sounds active all four quadrants,     no masses, no organomegaly   Extremities:   Extremities normal, atraumatic, no cyanosis or edema   Pulses:   2+ and symmetric all extremities   Skin:   Skin color, texture, turgor normal, no rashes or lesions   Lymph nodes:   Cervical, supraclavicular, and axillary nodes normal   Neurologic:   CNII-XII intact. Normal strength, sensation and reflexes       throughout      Results for orders placed or performed in visit on 02/18/20   POC Urinalysis Dipstick, Automated   Result Value Ref Range    Color Yellow Yellow, Straw, Dark Yellow, Mary    Clarity, UA Clear Clear    Specific Gravity  1.030 1.005 - 1.030    pH, Urine 5.5 5.0 - 8.0    Leukocytes Negative Negative    Nitrite, UA Negative Negative    Protein, POC Negative Negative mg/dL    Glucose, UA Negative Negative, 1000 mg/dL (3+) mg/dL    Ketones, UA Negative Negative    Urobilinogen, UA Normal Normal    Bilirubin Negative Negative    Blood, UA Negative Negative         Assessment/Plan     Just got back to [facility]             Fazal was seen today for diabetes.    Diagnoses and all orders for this visit:    Type 2 diabetes mellitus without complication, without long-term current use of insulin (CMS/[facility])  -     Lipid Panel  -     CBC & Differential  -     Comprehensive Metabolic Panel  -     Vitamin B12  -     TSH  -     T4, Free  -     PSA Screen  -     Hemoglobin A1c  -     MicroAlbumin, Urine, Random - Urine, Clean Catch    Hypertension, unspecified type  -     Lipid Panel  -     CBC & Differential  -     Comprehensive Metabolic Panel  -     Vitamin  B12  -     TSH  -     T4, Free  -     PSA Screen  -     Hemoglobin A1c  -     MicroAlbumin, Urine, Random - Urine, Clean Catch    Type 2 diabetes, controlled, with peripheral neuropathy (CMS/HCC)    Encounter for screening for malignant neoplasm of prostate   -     PSA Screen      Return in about 6 months (around 1/1/2021).          There are no Patient Instructions on file for this visit.     Matthew Carr MD    Assessment/Plan

## 2020-07-24 RX ORDER — COLESEVELAM HYDROCHLORIDE 625 MG/1
TABLET, FILM COATED ORAL
Qty: 180 TABLET | Refills: 3 | Status: SHIPPED | OUTPATIENT
Start: 2020-07-24 | End: 2022-05-06

## 2020-08-21 ENCOUNTER — OFFICE VISIT (OUTPATIENT)
Dept: UROLOGY | Facility: CLINIC | Age: 75
End: 2020-08-21

## 2020-08-21 VITALS
TEMPERATURE: 98.4 F | OXYGEN SATURATION: 98 % | SYSTOLIC BLOOD PRESSURE: 140 MMHG | WEIGHT: 212 LBS | DIASTOLIC BLOOD PRESSURE: 72 MMHG | HEART RATE: 63 BPM | HEIGHT: 72 IN | BODY MASS INDEX: 28.71 KG/M2

## 2020-08-21 DIAGNOSIS — C61 MALIGNANT NEOPLASM OF PROSTATE (HCC): Primary | ICD-10-CM

## 2020-08-21 PROCEDURE — 99214 OFFICE O/P EST MOD 30 MIN: CPT | Performed by: UROLOGY

## 2020-08-21 NOTE — PROGRESS NOTES
"Chief Complaint  Prostate Cancer        HPI  Morena is a 75 y.o. male who turns today for routine follow-up with a past history of prostate cancer managed on a program of active surveillance.  We caught it early stage and it was a low-grade with no signs of progression since diagnosis.  He most recently had a PSA by Dr. Maradiaga and it was only 1.08.  He continues to minimize any difficulty voiding and returns today for his digital rectal exam.  He does continue Proscar to keep the gland from growing and there is some evidence this may slow the growth of the cancer.  It certainly lowers his PSA but even if you doubled it would be well within normal limits.    Vitals:    08/21/20 0929   BP: 140/72   Pulse: 63   Temp: 98.4 °F (36.9 °C)   SpO2: 98%       Past Medical History  Past Medical History:   Diagnosis Date   • Acute asthma exacerbation    • Acute sinusitis    • Acute URI    • Allergic rhinitis    • Anemia    • Arthritis    • Asthma    • Benign prostatic hypertrophy    • Bronchitis    • Chest pain     \"I'VE SEEN DR. CARRASCO FOR IT A LONG TIME AGO, AND THAT'S WHEN HE DID TESTING\".  STATES HAS NOT MENTIONED IT TO DR. KHAN.  STATES UNSURE OF WHEN LAST TIME WAS\".  PT STATES \"I'M NOT A COMPLAINER\".  STATES COLD WEATHER AND WALKING BRING IT ON.  GETS SHORTNESS OF BREATH OCCASSIONALLY.  MORE DESCRIBED AS PRESSURE.    • ED (erectile dysfunction)    • Elevated cholesterol    • Family history of deafness and hearing loss    • Full dentures     INSTRUCTED NO ADHESIVES THE DOS   • GERD (gastroesophageal reflux disease)    • H/O echocardiogram 2003   • Hiatal hernia    • History of fracture     REPORTS TOE ON RIGHT FOOT - NO SURGICAL INTERVENTION REQUIRED   • [place] (hard of hearing)     SPOUSE REPORTS NO USE OF HEARING AIDS   • Hx of exercise stress test     SPOUSE REPORTS LAST DONE BY DR ALMANZA AROUND JUNE 2018 AND REPORTED ALL WNL'S AND MD RELEASED PATIENT   • Hyperlipidemia    • Hypertension    • Jaundice     AS A TEEN    • " "Obstructive sleep apnea     NO CPAP - SPOUSE REPORTS \"HE WOULDN'T DO IT\"   • Peripheral neuropathy    • Pneumonia     SPOUSE REPORTS HISTORY   • Prostate cancer (CMS/HCC)    • Short of breath on exertion    • Skin cancer    • Type 2 diabetes mellitus (CMS/HCC)    • Urinary frequency    • Vitamin B 12 deficiency    • Vitamin D deficiency    • Wears glasses        Past Surgical History  Past Surgical History:   Procedure Laterality Date   • APPENDECTOMY     • CARDIAC CATHETERIZATION  2003?    \"IT WAS OK\"   • CHOLECYSTECTOMY  06/2018   • CHOLECYSTECTOMY WITH INTRAOPERATIVE CHOLANGIOGRAM N/A 5/15/2018    Procedure: CHOLECYSTECTOMY LAPAROSCOPIC INTRAOPERATIVE CHOLANGIOGRAM;  Surgeon: Eric Bran MD;  Location: [facility] OR;  Service: General   • COLONOSCOPY     • COLONOSCOPY N/A 8/8/2018    Procedure: COLONOSCOPY;  Surgeon: Eric Bran MD;  Location: [facility] ENDOSCOPY;  Service: Gastroenterology   • ENDOSCOPY     • HAND SURGERY Left    • KNEE ARTHROSCOPY Left    • MOUTH SURGERY      FULL MOUTH EXTRACTION   • SKIN CANCER EXCISION  07/22/2019    skin cancer on head       Medications  has a current medication list which includes the following prescription(s): accu-chek softclix lancets, albuterol sulfate hfa, b-d single use swabs regular, aspirin, bisacodyl, blood gluc meter disp-strips, surechek control solution, accu-chek jose m plus, vitamin d, chromium-cinnamon, finasteride, glimepiride, glucosamine-chondroit-vit c-mn, glucose blood, losartan-hydrochlorothiazide, metformin, sitagliptin, and welchol.      Allergies  Allergies   Allergen Reactions   • Statins Myalgia       Social History  Social History     Socioeconomic History   • Marital status:      Spouse name: Not on file   • Number of children: Not on file   • Years of education: Not on file   • Highest education level: Not on file   Occupational History     Employer: RETIRED   Tobacco Use   • Smoking status: Never Smoker   • Smokeless tobacco: Never " Used   Substance and Sexual Activity   • Alcohol use: No   • Drug use: No   • Sexual activity: Defer       Family History  He has no family history of bladder or kidney cancer  He has no family history of kidney stones      AUA Symptom Score:      Review of Systems  Review of Systems   Constitutional: Negative for activity change, appetite change, chills, fatigue, fever, unexpected weight gain and unexpected weight loss.   Respiratory: Negative for apnea, cough, chest tightness, shortness of breath, wheezing and stridor.    Cardiovascular: Negative for chest pain, palpitations and leg swelling.   Gastrointestinal: Negative for abdominal distention, abdominal pain, anal bleeding, blood in stool, constipation, diarrhea, nausea, rectal pain, vomiting, GERD and indigestion.   Genitourinary: Negative for decreased libido, decreased urine volume, difficulty urinating, discharge, dysuria, flank pain, frequency, genital sores, hematuria, nocturia, penile pain, erectile dysfunction, penile swelling, scrotal swelling, testicular pain, urgency and urinary incontinence.   Musculoskeletal: Negative for back pain and joint swelling.   Neurological: Negative for tremors, seizures, speech difficulty, weakness and numbness.   Psychiatric/Behavioral: Negative for agitation, decreased concentration, sleep disturbance, depressed mood and stress. The patient is not nervous/anxious.        Physical Exam  Physical Exam   Constitutional: He is oriented to person, place, and time. He appears well-developed and well-nourished.   HENT:   Head: Normocephalic and atraumatic.   Neck: Normal range of motion.   Pulmonary/Chest: Effort normal. No respiratory distress.   Abdominal: Soft. He exhibits no distension and no mass. There is no tenderness. No hernia.   Genitourinary: Rectum normal and prostate normal.   Musculoskeletal: Normal range of motion.   Lymphadenopathy:     He has no cervical adenopathy.   Neurological: He is alert and oriented to  person, place, and time.   Skin: Skin is warm and dry.   Psychiatric: He has a normal mood and affect. His behavior is normal.   Vitals reviewed.      Labs Recent and today in the office:  Results for orders placed or performed in visit on 02/18/20   POC Urinalysis Dipstick, Automated   Result Value Ref Range    Color Yellow Yellow, Straw, Dark Yellow, Mary    Clarity, UA Clear Clear    Specific Gravity  1.030 1.005 - 1.030    pH, Urine 5.5 5.0 - 8.0    Leukocytes Negative Negative    Nitrite, UA Negative Negative    Protein, POC Negative Negative mg/dL    Glucose, UA Negative Negative, 1000 mg/dL (3+) mg/dL    Ketones, UA Negative Negative    Urobilinogen, UA Normal Normal    Bilirubin Negative Negative    Blood, UA Negative Negative         Assessment & Plan  Prostate cancer: We continue a program of active surveillance so I encouraged him to return in 6 months with PSA.  Digital rectal exam today is totally benign with a slightly enlarged smooth prostate and no significant induration.  He is encouraged to eat more of a plant-based heart healthy diet to reduce the risk of cancer progression.  Of course he is a .

## 2020-09-21 ENCOUNTER — TELEPHONE (OUTPATIENT)
Dept: INTERNAL MEDICINE | Facility: CLINIC | Age: 75
End: 2020-09-21

## 2020-09-21 DIAGNOSIS — E11.42 TYPE 2 DIABETES, CONTROLLED, WITH PERIPHERAL NEUROPATHY (HCC): ICD-10-CM

## 2020-09-21 NOTE — TELEPHONE ENCOUNTER
Patient has been scheduled for an appointment, samples given per Dr. Carr until patient can be seen.

## 2020-09-21 NOTE — TELEPHONE ENCOUNTER
Caller: Fazal Berg III    Relationship: Self    Best call back number: [phone]    What medications are you currently taking:   Current Outpatient Medications on File Prior to Visit   Medication Sig Dispense Refill   • ACCU-CHEK SOFTCLIX LANCETS lancets 1 each by Other route 3 (Three) Times a Day. Use as instructed 300 each 3   • albuterol (VENTOLIN HFA) 108 (90 BASE) MCG/ACT inhaler Ventolin  (90 Base) MCG/ACT Inhalation Aerosol Solution; Patient Sig: Ventolin  (90 Base) MCG/ACT Inhalation Aerosol Solution INHALE 1 TO 2 PUFFS EVERY 4 TO 6 HOURS AS NEEDED.; 1; 11; 18-Aug-2015; Active     • Alcohol Swabs (B-D SINGLE USE SWABS REGULAR) pads 1 swab 3 (three) times a day. 90 each 3   • aspirin 81 MG EC tablet Take 81 mg by mouth Daily.     • bisacodyl (bisacodyl) 5 MG EC tablet Take 1 tablet by mouth Daily. 4 tablet 0   • Blood Gluc Meter Disp-Strips device 1 strip 3 (Three) Times a Day. Patient needs Accu-chek meter and test strips. 1 each 0   • Blood Glucose Calibration (SURECHEK CONTROL SOLUTION) NORMAL liquid 1 bottle by In Vitro route every 30 (thirty) days. 3 each 3   • Blood Glucose Monitoring Suppl (ACCU-CHEK IDANIA PLUS) w/Device kit TEST THREE TIMES DAILY 1 kit 0   • Cholecalciferol (VITAMIN D) 2000 UNITS tablet Take 1 capsule by mouth 3 (Three) Times a Week.     • Chromium-Cinnamon (CINNAMON PLUS CHROMIUM) 100-500 MCG-MG capsule Take 2 capsules by mouth 2 (two) times a day.     • finasteride (PROSCAR) 5 MG tablet Take 1 tablet by mouth Daily. 90 tablet 3   • glimepiride (AMARYL) 4 MG tablet Take 1 tablet by mouth Daily. 90 tablet 3   • Glucosamine-Chondroit-Vit C-Mn (GLUCOSAMINE 1500 COMPLEX PO) Take 1 capsule by mouth 2 (Two) Times a Day.     • glucose blood (ACCU-CHEK IDANIA) test strip 1 each by Other route 3 (Three) Times a Day. Use as instructed 300 each 3   • losartan-hydrochlorothiazide (Hyzaar) 50-12.5 MG per tablet Take 1 tablet by mouth Daily. 90 tablet 3   • metFORMIN  (GLUCOPHAGE) 500 MG tablet Take 2 tablets by mouth 2 (Two) Times a Day With Meals. 180 tablet 11   • SITagliptin (JANUVIA) 100 MG tablet Take 1 tablet by mouth Daily. 90 tablet 2   • WELCHOL 625 MG tablet TAKE 1 TABLET TWICE DAILY 180 tablet 3     No current facility-administered medications on file prior to visit.         When did you start taking these medications:     Which medication are you concerned about: SITagliptin (JANUVIA) 100 MG tablet    Who prescribed you this medication: ERIN    What are your concerns: Patient stated that his medication has gone up $300 and is asking for a more cost effective medication.    How long have you been taking these medications:     How long have you had these concerns:

## 2020-10-22 ENCOUNTER — OFFICE VISIT (OUTPATIENT)
Dept: INTERNAL MEDICINE | Facility: CLINIC | Age: 75
End: 2020-10-22

## 2020-10-22 VITALS
DIASTOLIC BLOOD PRESSURE: 62 MMHG | BODY MASS INDEX: 28.17 KG/M2 | OXYGEN SATURATION: 100 % | WEIGHT: 208 LBS | TEMPERATURE: 97.7 F | HEIGHT: 72 IN | HEART RATE: 75 BPM | RESPIRATION RATE: 16 BRPM | SYSTOLIC BLOOD PRESSURE: 144 MMHG

## 2020-10-22 DIAGNOSIS — I10 HYPERTENSION, UNSPECIFIED TYPE: ICD-10-CM

## 2020-10-22 DIAGNOSIS — R19.7 DIARRHEA, UNSPECIFIED TYPE: ICD-10-CM

## 2020-10-22 DIAGNOSIS — E11.42 TYPE 2 DIABETES, CONTROLLED, WITH PERIPHERAL NEUROPATHY (HCC): Primary | ICD-10-CM

## 2020-10-22 PROCEDURE — 99213 OFFICE O/P EST LOW 20 MIN: CPT | Performed by: INTERNAL MEDICINE

## 2020-10-22 PROCEDURE — G0008 ADMIN INFLUENZA VIRUS VAC: HCPCS | Performed by: INTERNAL MEDICINE

## 2020-10-22 PROCEDURE — 90694 VACC AIIV4 NO PRSRV 0.5ML IM: CPT | Performed by: INTERNAL MEDICINE

## 2020-10-22 RX ORDER — ORAL SEMAGLUTIDE 3 MG/1
3 TABLET ORAL DAILY
Qty: 30 TABLET | Refills: 11 | Status: SHIPPED | OUTPATIENT
Start: 2020-10-22 | End: 2020-10-22 | Stop reason: SDUPTHER

## 2020-10-22 RX ORDER — ORAL SEMAGLUTIDE 3 MG/1
3 TABLET ORAL DAILY
Qty: 30 TABLET | Refills: 11 | COMMUNITY
Start: 2020-10-22 | End: 2020-12-03

## 2020-10-22 NOTE — PROGRESS NOTES
Subjective     Patient ID: Fazal Berg III is a 75 y.o. male. Patient is here for management of multiple medical problems.     Chief Complaint   Patient presents with   • Diabetes     3 month follow-up, patient unable to afford Januvia      History of Present Illness       Wt loss   And not trying.    DM and watching his diet a bit.      BM's 2-3 x a day.  Abnormal for past few months.  On metformin cut back some and now worse.  Watery stool            The following portions of the patient's history were reviewed and updated as appropriate: allergies, current medications, past family history, past medical history, past social history, past surgical history and problem list.    Review of Systems   Constitutional: Negative for fatigue.   HENT: Negative for facial swelling, hearing loss and mouth sores.    Gastrointestinal: Negative for diarrhea.   Psychiatric/Behavioral: Negative for self-injury and sleep disturbance. The patient is not nervous/anxious and is not hyperactive.    All other systems reviewed and are negative.      Current Outpatient Medications:   •  ACCU-CHEK SOFTCLIX LANCETS lancets, 1 each by Other route 3 (Three) Times a Day. Use as instructed, Disp: 300 each, Rfl: 3  •  albuterol (VENTOLIN HFA) 108 (90 BASE) MCG/ACT inhaler, Ventolin  (90 Base) MCG/ACT Inhalation Aerosol Solution; Patient Sig: Ventolin  (90 Base) MCG/ACT Inhalation Aerosol Solution INHALE 1 TO 2 PUFFS EVERY 4 TO 6 HOURS AS NEEDED.; 1; 11; 18-Aug-2015; Active, Disp: , Rfl:   •  Alcohol Swabs (B-D SINGLE USE SWABS REGULAR) pads, 1 swab 3 (three) times a day., Disp: 90 each, Rfl: 3  •  aspirin 81 MG EC tablet, Take 81 mg by mouth Daily., Disp: , Rfl:   •  bisacodyl (bisacodyl) 5 MG EC tablet, Take 1 tablet by mouth Daily., Disp: 4 tablet, Rfl: 0  •  Blood Gluc Meter Disp-Strips device, 1 strip 3 (Three) Times a Day. Patient needs Accu-chek meter and test strips., Disp: 1 each, Rfl: 0  •  Blood Glucose Calibration  "(SURECHEK CONTROL SOLUTION) NORMAL liquid, 1 bottle by In Vitro route every 30 (thirty) days., Disp: 3 each, Rfl: 3  •  Blood Glucose Monitoring Suppl (ACCU-CHEK IDANIA PLUS) w/Device kit, TEST THREE TIMES DAILY, Disp: 1 kit, Rfl: 0  •  Cholecalciferol (VITAMIN D) 2000 UNITS tablet, Take 1 capsule by mouth 3 (Three) Times a Week., Disp: , Rfl:   •  Chromium-Cinnamon (CINNAMON PLUS CHROMIUM) 100-500 MCG-MG capsule, Take 2 capsules by mouth 2 (two) times a day., Disp: , Rfl:   •  finasteride (PROSCAR) 5 MG tablet, Take 1 tablet by mouth Daily., Disp: 90 tablet, Rfl: 3  •  glimepiride (AMARYL) 4 MG tablet, Take 1 tablet by mouth Daily., Disp: 90 tablet, Rfl: 3  •  Glucosamine-Chondroit-Vit C-Mn (GLUCOSAMINE 1500 COMPLEX PO), Take 1 capsule by mouth 2 (Two) Times a Day., Disp: , Rfl:   •  glucose blood (ACCU-CHEK IDANIA) test strip, 1 each by Other route 3 (Three) Times a Day. Use as instructed, Disp: 300 each, Rfl: 3  •  losartan-hydrochlorothiazide (Hyzaar) 50-12.5 MG per tablet, Take 1 tablet by mouth Daily., Disp: 90 tablet, Rfl: 3  •  metFORMIN (GLUCOPHAGE) 500 MG tablet, Take 2 tablets by mouth 2 (Two) Times a Day With Meals. (Patient taking differently: Take 500 mg by mouth 2 (Two) Times a Day With Meals.), Disp: 180 tablet, Rfl: 11  •  WELCHOL 625 MG tablet, TAKE 1 TABLET TWICE DAILY, Disp: 180 tablet, Rfl: 3  •  Semaglutide (Rybelsus) 3 MG tablet, Take 3 mg by mouth Daily., Disp: 30 tablet, Rfl: 11    Objective      Blood pressure 144/62, pulse 75, temperature 97.7 °F (36.5 °C), temperature source Temporal, resp. rate 16, height 182.9 cm (72\"), weight 94.3 kg (208 lb), SpO2 100 %.    Physical Exam     General Appearance:    Alert, cooperative, no distress, appears stated age   Head:    Normocephalic, without obvious abnormality, atraumatic   Eyes:    PERRL, conjunctiva/corneas clear, EOM's intact   Ears:    Normal TM's and external ear canals, both ears   Nose:   Nares normal, septum midline, mucosa normal, no " drainage   or sinus tenderness   Throat:   Lips, mucosa, and tongue normal; teeth and gums normal   Neck:   Supple, symmetrical, trachea midline, no adenopathy;        thyroid:  No enlargement/tenderness/nodules; no carotid    bruit or JVD   Back:     Symmetric, no curvature, ROM normal, no CVA tenderness   Lungs:     Clear to auscultation bilaterally, respirations unlabored   Chest wall:    No tenderness or deformity   Heart:    Regular rate and rhythm, S1 and S2 normal, no murmur,        rub or gallop   Abdomen:     Soft, non-tender, bowel sounds active all four quadrants,     no masses, no organomegaly   Extremities:   Extremities normal, atraumatic, no cyanosis or edema   Pulses:   2+ and symmetric all extremities   Skin:   Skin color, texture, turgor normal, no rashes or lesions   Lymph nodes:   Cervical, supraclavicular, and axillary nodes normal   Neurologic:   CNII-XII intact. Normal strength, sensation and reflexes       throughout      Results for orders placed or performed in visit on 02/18/20   POC Urinalysis Dipstick, Automated    Specimen: Urine   Result Value Ref Range    Color Yellow Yellow, Straw, Dark Yellow, Mary    Clarity, UA Clear Clear    Specific Gravity  1.030 1.005 - 1.030    pH, Urine 5.5 5.0 - 8.0    Leukocytes Negative Negative    Nitrite, UA Negative Negative    Protein, POC Negative Negative mg/dL    Glucose, UA Negative Negative, 1000 mg/dL (3+) mg/dL    Ketones, UA Negative Negative    Urobilinogen, UA Normal Normal    Bilirubin Negative Negative    Blood, UA Negative Negative         Assessment/Plan       Diagnoses and all orders for this visit:    1. Type 2 diabetes, controlled, with peripheral neuropathy (CMS/HCC) (Primary)  -     Pancreatic Elastase, Fecal - Stool, Per Rectum  -     Ova & Parasite Examination - Stool, Per Rectum  -     Gastrointestinal Panel, PCR - Stool, Per Rectum  -     Fecal Leukocytes - Stool, Per Rectum  -     Clostridium Difficile EIA - Stool, Per  Rectum  -     Lipid Panel  -     CBC & Differential  -     Vitamin B12  -     Comprehensive Metabolic Panel  -     TSH  -     T4, Free  -     Hemoglobin A1c  -     Glia(IgA / G) & TTG(IgA / G)  -     H. Pylori Antibody, IgA  -     H. Pylori Antibody, IgG  -     Endomysial Antibody, IgA / IGG Titer    2. Hypertension, unspecified type  -     Pancreatic Elastase, Fecal - Stool, Per Rectum  -     Ova & Parasite Examination - Stool, Per Rectum  -     Gastrointestinal Panel, PCR - Stool, Per Rectum  -     Fecal Leukocytes - Stool, Per Rectum  -     Clostridium Difficile EIA - Stool, Per Rectum  -     Lipid Panel  -     CBC & Differential  -     Vitamin B12  -     Comprehensive Metabolic Panel  -     TSH  -     T4, Free  -     Hemoglobin A1c  -     Glia(IgA / G) & TTG(IgA / G)  -     H. Pylori Antibody, IgA  -     H. Pylori Antibody, IgG  -     Endomysial Antibody, IgA / IGG Titer    3. Diarrhea, unspecified type  -     Pancreatic Elastase, Fecal - Stool, Per Rectum  -     Ova & Parasite Examination - Stool, Per Rectum  -     Gastrointestinal Panel, PCR - Stool, Per Rectum  -     Fecal Leukocytes - Stool, Per Rectum  -     Clostridium Difficile EIA - Stool, Per Rectum  -     Lipid Panel  -     CBC & Differential  -     Vitamin B12  -     Comprehensive Metabolic Panel  -     TSH  -     T4, Free  -     Hemoglobin A1c  -     Glia(IgA / G) & TTG(IgA / G)  -     H. Pylori Antibody, IgA  -     H. Pylori Antibody, IgG  -     Endomysial Antibody, IgA / IGG Titer    Other orders  -     Fluad Quad 65+ yrs ([id number])  -     Semaglutide (Rybelsus) 3 MG tablet; Take 3 mg by mouth Daily.  Dispense: 30 tablet; Refill: 11      Return in about 6 months (around 4/22/2021).          There are no Patient Instructions on file for this visit.     Matthew Carr MD    Assessment/Plan

## 2020-12-03 ENCOUNTER — OFFICE VISIT (OUTPATIENT)
Dept: INTERNAL MEDICINE | Facility: CLINIC | Age: 75
End: 2020-12-03

## 2020-12-03 VITALS
OXYGEN SATURATION: 99 % | HEIGHT: 72 IN | SYSTOLIC BLOOD PRESSURE: 106 MMHG | DIASTOLIC BLOOD PRESSURE: 76 MMHG | BODY MASS INDEX: 28.44 KG/M2 | HEART RATE: 71 BPM | WEIGHT: 210 LBS | TEMPERATURE: 97.6 F | RESPIRATION RATE: 16 BRPM

## 2020-12-03 DIAGNOSIS — E11.9 TYPE 2 DIABETES MELLITUS WITHOUT COMPLICATION, WITHOUT LONG-TERM CURRENT USE OF INSULIN (HCC): Primary | ICD-10-CM

## 2020-12-03 DIAGNOSIS — Z12.5 PROSTATE CANCER SCREENING: ICD-10-CM

## 2020-12-03 PROCEDURE — 99213 OFFICE O/P EST LOW 20 MIN: CPT | Performed by: INTERNAL MEDICINE

## 2020-12-03 NOTE — PROGRESS NOTES
Subjective     Patient ID: Fazal Berg III is a 75 y.o. male. Patient is here for management of multiple medical problems.     Chief Complaint   Patient presents with   • Diabetes     History of Present Illness     reybelus didn't work and bs went up.   Wt neutral.  Pt may have stopped metformin and started Reybelus.   Metformin up to 750mg at day.           The following portions of the patient's history were reviewed and updated as appropriate: allergies, current medications, past family history, past medical history, past social history, past surgical history and problem list.    Review of Systems   Constitutional: Negative for fatigue.   HENT: Negative for ear pain, facial swelling, nosebleeds and rhinorrhea.    Psychiatric/Behavioral: Negative for self-injury and sleep disturbance. The patient is not nervous/anxious and is not hyperactive.    All other systems reviewed and are negative.      Current Outpatient Medications:   •  ACCU-CHEK SOFTCLIX LANCETS lancets, 1 each by Other route 3 (Three) Times a Day. Use as instructed, Disp: 300 each, Rfl: 3  •  albuterol (VENTOLIN HFA) 108 (90 BASE) MCG/ACT inhaler, Ventolin  (90 Base) MCG/ACT Inhalation Aerosol Solution; Patient Sig: Ventolin  (90 Base) MCG/ACT Inhalation Aerosol Solution INHALE 1 TO 2 PUFFS EVERY 4 TO 6 HOURS AS NEEDED.; 1; 11; 18-Aug-2015; Active, Disp: , Rfl:   •  Alcohol Swabs (B-D SINGLE USE SWABS REGULAR) pads, 1 swab 3 (three) times a day., Disp: 90 each, Rfl: 3  •  aspirin 81 MG EC tablet, Take 81 mg by mouth Daily., Disp: , Rfl:   •  Cholecalciferol (VITAMIN D) 2000 UNITS tablet, Take 1 capsule by mouth 3 (Three) Times a Week., Disp: , Rfl:   •  Chromium-Cinnamon (CINNAMON PLUS CHROMIUM) 100-500 MCG-MG capsule, Take 2 capsules by mouth 2 (two) times a day., Disp: , Rfl:   •  finasteride (PROSCAR) 5 MG tablet, Take 1 tablet by mouth Daily., Disp: 90 tablet, Rfl: 3  •  glimepiride (AMARYL) 4 MG tablet, Take 1 tablet by mouth  "Daily., Disp: 90 tablet, Rfl: 3  •  Glucosamine-Chondroit-Vit C-Mn (GLUCOSAMINE 1500 COMPLEX PO), Take 1 capsule by mouth 2 (Two) Times a Day., Disp: , Rfl:   •  losartan-hydrochlorothiazide (Hyzaar) 50-12.5 MG per tablet, Take 1 tablet by mouth Daily., Disp: 90 tablet, Rfl: 3  •  metFORMIN (GLUCOPHAGE) 500 MG tablet, Take 2 tablets by mouth 2 (Two) Times a Day With Meals. (Patient taking differently: Take 500 mg by mouth 2 (Two) Times a Day With Meals.), Disp: 180 tablet, Rfl: 11  •  SITagliptin (JANUVIA) 100 MG tablet, Take 100 mg by mouth Daily., Disp: , Rfl:   •  WELCHOL 625 MG tablet, TAKE 1 TABLET TWICE DAILY, Disp: 180 tablet, Rfl: 3  •  bisacodyl (bisacodyl) 5 MG EC tablet, Take 1 tablet by mouth Daily., Disp: 4 tablet, Rfl: 0  •  Blood Gluc Meter Disp-Strips device, 1 strip 3 (Three) Times a Day. Patient needs Accu-chek meter and test strips., Disp: 1 each, Rfl: 0  •  Blood Glucose Calibration (SURECHEK CONTROL SOLUTION) NORMAL liquid, 1 bottle by In Vitro route every 30 (thirty) days., Disp: 3 each, Rfl: 3  •  Blood Glucose Monitoring Suppl (ACCU-CHEK IDANIA PLUS) w/Device kit, TEST THREE TIMES DAILY, Disp: 1 kit, Rfl: 0  •  glucose blood (ACCU-CHEK IDANIA) test strip, 1 each by Other route 3 (Three) Times a Day. Use as instructed, Disp: 300 each, Rfl: 3    Objective      Blood pressure 106/76, pulse 71, temperature 97.6 °F (36.4 °C), resp. rate 16, height 182.9 cm (72\"), weight 95.3 kg (210 lb), SpO2 99 %.    Physical Exam     General Appearance:    Alert, cooperative, no distress, appears stated age   Head:    Normocephalic, without obvious abnormality, atraumatic   Eyes:    PERRL, conjunctiva/corneas clear, EOM's intact   Ears:    Normal TM's and external ear canals, both ears   Nose:   Nares normal, septum midline, mucosa normal, no drainage   or sinus tenderness   Throat:   Lips, mucosa, and tongue normal; teeth and gums normal   Neck:   Supple, symmetrical, trachea midline, no adenopathy;        " thyroid:  No enlargement/tenderness/nodules; no carotid    bruit or JVD   Back:     Symmetric, no curvature, ROM normal, no CVA tenderness   Lungs:     Clear to auscultation bilaterally, respirations unlabored   Chest wall:    No tenderness or deformity   Heart:    Regular rate and rhythm, S1 and S2 normal, no murmur,        rub or gallop   Abdomen:     Soft, non-tender, bowel sounds active all four quadrants,     no masses, no organomegaly   Extremities:   Extremities normal, atraumatic, no cyanosis or edema   Pulses:   2+ and symmetric all extremities   Skin:   Skin color, texture, turgor normal, no rashes or lesions   Lymph nodes:   Cervical, supraclavicular, and axillary nodes normal   Neurologic:   CNII-XII intact. Normal strength, sensation and reflexes       throughout      Results for orders placed or performed in visit on 10/22/20   Lipid Panel    Specimen: Blood   Result Value Ref Range    Total Cholesterol 183 0 - 200 mg/dL    Triglycerides 183 (H) 0 - 150 mg/dL    HDL Cholesterol 50 40 - 60 mg/dL    VLDL Cholesterol Slim 32 5 - 40 mg/dL    LDL Chol Calc (NIH) 101 (H) 0 - 100 mg/dL   Vitamin B12    Specimen: Blood   Result Value Ref Range    Vitamin B-12 345 211 - 946 pg/mL   Comprehensive Metabolic Panel    Specimen: Blood   Result Value Ref Range    Glucose 103 (H) 65 - 99 mg/dL    BUN 14 8 - 23 mg/dL    Creatinine 1.26 0.76 - 1.27 mg/dL    eGFR Non African Am 56 (L) >60 mL/min/1.73    eGFR African Am 68 >60 mL/min/1.73    BUN/Creatinine Ratio 11.1 7.0 - 25.0    Sodium 140 136 - 145 mmol/L    Potassium 4.2 3.5 - 5.2 mmol/L    Chloride 100 98 - 107 mmol/L    Total CO2 29.1 (H) 22.0 - 29.0 mmol/L    Calcium 10.2 8.6 - 10.5 mg/dL    Total Protein 6.8 6.0 - 8.5 g/dL    Albumin 4.50 3.50 - 5.20 g/dL    Globulin 2.3 gm/dL    A/G Ratio 2.0 g/dL    Total Bilirubin 0.8 0.0 - 1.2 mg/dL    Alkaline Phosphatase 69 39 - 117 U/L    AST (SGOT) 14 1 - 40 U/L    ALT (SGPT) 17 1 - 41 U/L   TSH    Specimen: Blood   Result  Value Ref Range    TSH 0.878 0.270 - 4.200 uIU/mL   T4, Free    Specimen: Blood   Result Value Ref Range    Free T4 1.14 0.93 - 1.70 ng/dL   Hemoglobin A1c    Specimen: Blood   Result Value Ref Range    Hemoglobin A1C 7.50 (H) 4.80 - 5.60 %   Glia(IgA / G) & TTG(IgA / G)    Specimen: Blood   Result Value Ref Range    Gliadin Deamidated Peptide Ab, IgA 3 0 - 19 units    Deaminated Gliadin Ab IgG 3 0 - 19 units    Tissue Transglutaminase IgA <2 0 - 3 U/mL    Tissue Transglutaminase IgG 5 0 - 5 U/mL   H. Pylori Antibody, IgA    Specimen: Blood   Result Value Ref Range    H. pylori, IgA ABS <9.0 0.0 - 8.9 units   H. Pylori Antibody, IgG    Specimen: Blood   Result Value Ref Range    H. pylori IgG 0.41 0.00 - 0.79 Index Value   Endomysial Antibody, IgA / IGG Titer    Specimen: Blood   Result Value Ref Range    Endomysial Antibody Titer IgA Comment Titer    Endomysial Antibody Titer IgG     CBC & Differential    Specimen: Blood   Result Value Ref Range    WBC 6.04 3.40 - 10.80 10*3/mm3    RBC 4.53 4.14 - 5.80 10*6/mm3    Hemoglobin 14.2 13.0 - 17.7 g/dL    Hematocrit 41.5 37.5 - 51.0 %    MCV 91.6 79.0 - 97.0 fL    MCH 31.3 26.6 - 33.0 pg    MCHC 34.2 31.5 - 35.7 g/dL    RDW 12.8 12.3 - 15.4 %    Platelets 214 140 - 450 10*3/mm3    Neutrophil Rel % 52.9 42.7 - 76.0 %    Lymphocyte Rel % 32.9 19.6 - 45.3 %    Monocyte Rel % 8.9 5.0 - 12.0 %    Eosinophil Rel % 3.8 0.3 - 6.2 %    Basophil Rel % 0.7 0.0 - 1.5 %    Neutrophils Absolute 3.19 1.70 - 7.00 10*3/mm3    Lymphocytes Absolute 1.99 0.70 - 3.10 10*3/mm3    Monocytes Absolute 0.54 0.10 - 0.90 10*3/mm3    Eosinophils Absolute 0.23 0.00 - 0.40 10*3/mm3    Basophils Absolute 0.04 0.00 - 0.20 10*3/mm3    Immature Granulocyte Rel % 0.8 (H) 0.0 - 0.5 %    Immature Grans Absolute 0.05 0.00 - 0.05 10*3/mm3    nRBC 0.0 0.0 - 0.2 /100 WBC         Assessment/Plan   Go back to metformin at bid dose 500 mg. Given      Diagnoses and all orders for this visit:    1. Type 2 diabetes  mellitus without complication, without long-term current use of insulin (CMS/HCC) (Primary)  -     T4, Free  -     TSH  -     Comprehensive Metabolic Panel  -     Vitamin B12  -     CBC & Differential  -     Lipid Panel  -     Hemoglobin A1c  -     MicroAlbumin, Urine, Random - Urine, Clean Catch    2. Prostate cancer screening  -     PSA Screen      Return in about 6 months (around 6/3/2021).          There are no Patient Instructions on file for this visit.     Matthew Carr MD    Assessment/Plan

## 2020-12-14 DIAGNOSIS — E11.9 TYPE 2 DIABETES MELLITUS WITHOUT COMPLICATION, WITHOUT LONG-TERM CURRENT USE OF INSULIN (HCC): Primary | ICD-10-CM

## 2020-12-14 LAB
ALBUMIN SERPL-MCNC: 4.5 G/DL (ref 3.5–5.2)
ALBUMIN/GLOB SERPL: 2 G/DL
ALP SERPL-CCNC: 69 U/L (ref 39–117)
ALT SERPL-CCNC: 17 U/L (ref 1–41)
AST SERPL-CCNC: 14 U/L (ref 1–40)
BASOPHILS # BLD AUTO: 0.04 10*3/MM3 (ref 0–0.2)
BASOPHILS NFR BLD AUTO: 0.7 % (ref 0–1.5)
BILIRUB SERPL-MCNC: 0.8 MG/DL (ref 0–1.2)
BUN SERPL-MCNC: 14 MG/DL (ref 8–23)
BUN/CREAT SERPL: 11.1 (ref 7–25)
CALCIUM SERPL-MCNC: 10.2 MG/DL (ref 8.6–10.5)
CHLORIDE SERPL-SCNC: 100 MMOL/L (ref 98–107)
CHOLEST SERPL-MCNC: 183 MG/DL (ref 0–200)
CO2 SERPL-SCNC: 29.1 MMOL/L (ref 22–29)
CREAT SERPL-MCNC: 1.26 MG/DL (ref 0.76–1.27)
ENDOMYSIAL ANTIBODY TITER IGA: NORMAL TITER
ENDOMYSIAL ANTIBODY TITER IGG: NORMAL TITER
EOSINOPHIL # BLD AUTO: 0.23 10*3/MM3 (ref 0–0.4)
EOSINOPHIL NFR BLD AUTO: 3.8 % (ref 0.3–6.2)
ERYTHROCYTE [DISTWIDTH] IN BLOOD BY AUTOMATED COUNT: 12.8 % (ref 12.3–15.4)
GLIADIN PEPTIDE IGA SER-ACNC: 3 UNITS (ref 0–19)
GLIADIN PEPTIDE IGG SER-ACNC: 3 UNITS (ref 0–19)
GLOBULIN SER CALC-MCNC: 2.3 GM/DL
GLUCOSE SERPL-MCNC: 103 MG/DL (ref 65–99)
H PYLORI IGA SER-ACNC: <9 UNITS (ref 0–8.9)
H PYLORI IGG SER IA-ACNC: 0.41 INDEX VALUE (ref 0–0.79)
HBA1C MFR BLD: 7.5 % (ref 4.8–5.6)
HCT VFR BLD AUTO: 41.5 % (ref 37.5–51)
HDLC SERPL-MCNC: 50 MG/DL (ref 40–60)
HGB BLD-MCNC: 14.2 G/DL (ref 13–17.7)
IMM GRANULOCYTES # BLD AUTO: 0.05 10*3/MM3 (ref 0–0.05)
IMM GRANULOCYTES NFR BLD AUTO: 0.8 % (ref 0–0.5)
LDLC SERPL CALC-MCNC: 101 MG/DL (ref 0–100)
LYMPHOCYTES # BLD AUTO: 1.99 10*3/MM3 (ref 0.7–3.1)
LYMPHOCYTES NFR BLD AUTO: 32.9 % (ref 19.6–45.3)
MCH RBC QN AUTO: 31.3 PG (ref 26.6–33)
MCHC RBC AUTO-ENTMCNC: 34.2 G/DL (ref 31.5–35.7)
MCV RBC AUTO: 91.6 FL (ref 79–97)
MONOCYTES # BLD AUTO: 0.54 10*3/MM3 (ref 0.1–0.9)
MONOCYTES NFR BLD AUTO: 8.9 % (ref 5–12)
NEUTROPHILS # BLD AUTO: 3.19 10*3/MM3 (ref 1.7–7)
NEUTROPHILS NFR BLD AUTO: 52.9 % (ref 42.7–76)
NRBC BLD AUTO-RTO: 0 /100 WBC (ref 0–0.2)
PLATELET # BLD AUTO: 214 10*3/MM3 (ref 140–450)
POTASSIUM SERPL-SCNC: 4.2 MMOL/L (ref 3.5–5.2)
PROT SERPL-MCNC: 6.8 G/DL (ref 6–8.5)
RBC # BLD AUTO: 4.53 10*6/MM3 (ref 4.14–5.8)
SODIUM SERPL-SCNC: 140 MMOL/L (ref 136–145)
T4 FREE SERPL-MCNC: 1.14 NG/DL (ref 0.93–1.7)
TRIGL SERPL-MCNC: 183 MG/DL (ref 0–150)
TSH SERPL DL<=0.005 MIU/L-ACNC: 0.88 UIU/ML (ref 0.27–4.2)
TTG IGA SER-ACNC: <2 U/ML (ref 0–3)
TTG IGG SER-ACNC: 5 U/ML (ref 0–5)
VIT B12 SERPL-MCNC: 345 PG/ML (ref 211–946)
VLDLC SERPL CALC-MCNC: 32 MG/DL (ref 5–40)
WBC # BLD AUTO: 6.04 10*3/MM3 (ref 3.4–10.8)

## 2020-12-14 NOTE — TELEPHONE ENCOUNTER
This says it is inactive and won't sign. I don't see the problem.can you figue it out or do I just order generic test strips.

## 2020-12-14 NOTE — TELEPHONE ENCOUNTER
Caller: Fazal Berg III    Relationship: Self    Best call back number: [phone]    Medication needed: TEST STRIPS FOR BLOOD PRESSURE    When do you need the refill by: ASAP    What details did the patient provide when requesting the medication: PATIENT STATES THAT HE CALLED TO REFILL THESE BUT THEY WHERE NOT SENT IN. PATIENT STATES THAT HE IS COMPLETELY OUT OF THESE TEST STRIPS.    Does the patient have less than a 3 day supply:  [x] Yes  [] No    What is the patient's preferred pharmacy: [id number] - [place], [place] - [address] - [phone]  - [phone] FX

## 2020-12-15 ENCOUNTER — TELEPHONE (OUTPATIENT)
Dept: INTERNAL MEDICINE | Facility: CLINIC | Age: 75
End: 2020-12-15

## 2020-12-15 DIAGNOSIS — E11.42 TYPE 2 DIABETES, CONTROLLED, WITH PERIPHERAL NEUROPATHY (HCC): Primary | ICD-10-CM

## 2020-12-15 DIAGNOSIS — E11.42 TYPE 2 DIABETES, CONTROLLED, WITH PERIPHERAL NEUROPATHY (HCC): ICD-10-CM

## 2020-12-15 RX ORDER — BLOOD-GLUCOSE METER
EACH MISCELLANEOUS
Qty: 1 KIT | Refills: 0 | Status: SHIPPED | OUTPATIENT
Start: 2020-12-15

## 2020-12-15 NOTE — TELEPHONE ENCOUNTER
Please review pended medication and sign if appropriate. Dr. Carr is out of the office this afternoon and you are the provider on call.

## 2020-12-15 NOTE — TELEPHONE ENCOUNTER
Caller: Fazal Berg III    Relationship: Self    Best call back number: [phone]    Medication needed:   glucose blood (Accu-Chek Alondra) test strip  1 each, Daily 3 ordered         Summary: 1 each by Other route Daily. Use as instructed, Starting Tue 12/15/2020, Normal   Dose, Route, Frequency: 1 each, Other, Daily            When do you need the refill by: TODAY    What details did the patient provide when requesting the medication: PATIENT STATES THAT THEY DID NOT GET THE TESTING STRIPS FOR THR ACCU- CHECK ALONDRA GLUCOSE METER.    Does the patient have less than a 3 day supply:  [x] Yes  [] No    What is the patient's preferred pharmacy:      [id number] - [place], [place] - [address] - [phone]  - [phone] FX

## 2020-12-15 NOTE — TELEPHONE ENCOUNTER
Sent to the wrong pharmacy yesterday. [facility]acy should be [facility]. Please call patient's wife at [phone] once completed since patient is out and has no way to check his sugar. Patient's wife also states that a new device is needed.

## 2021-02-04 ENCOUNTER — TELEPHONE (OUTPATIENT)
Dept: INTERNAL MEDICINE | Facility: CLINIC | Age: 76
End: 2021-02-04

## 2021-02-04 NOTE — TELEPHONE ENCOUNTER
PT WIFE STATED THAT PT HAS TESTED POSITIVE FOR COVID THIS WEEK(NOT SURE OF DATE). PT WIFE STATED THAT PT HAS A LOT OF CONGESTION AND COUGHING UP PHLEGM, HEAD ACHE AND GENERALLY NOT FEELING WELL. PT WIFE WOULD LIKE TO REQUEST A MEDICATION TO HELP HIM WITH HIS SYMPTOMS.    CALL BACK:[phone]       PHARMACY:[id number] [place], [place] - [address] AT [facility] [address] - [phone]  - [phone]   [phone]

## 2021-02-05 NOTE — TELEPHONE ENCOUNTER
Called patient with no answer, left a voicemail for patient informing and to call back how he is doing.

## 2021-04-01 DIAGNOSIS — N40.1 BENIGN NON-NODULAR PROSTATIC HYPERPLASIA WITH LOWER URINARY TRACT SYMPTOMS: ICD-10-CM

## 2021-04-01 RX ORDER — LOSARTAN POTASSIUM AND HYDROCHLOROTHIAZIDE 12.5; 5 MG/1; MG/1
TABLET ORAL
Qty: 90 TABLET | Refills: 3 | Status: SHIPPED | OUTPATIENT
Start: 2021-04-01 | End: 2022-05-06 | Stop reason: SDUPTHER

## 2021-04-01 RX ORDER — FINASTERIDE 5 MG/1
TABLET, FILM COATED ORAL
Qty: 90 TABLET | Refills: 3 | Status: SHIPPED | OUTPATIENT
Start: 2021-04-01 | End: 2022-04-05 | Stop reason: SDUPTHER

## 2021-04-28 LAB
ALBUMIN SERPL-MCNC: 4.4 G/DL (ref 3.5–5.2)
ALBUMIN/GLOB SERPL: 1.9 G/DL
ALP SERPL-CCNC: 59 U/L (ref 39–117)
ALT SERPL-CCNC: 16 U/L (ref 1–41)
AST SERPL-CCNC: 15 U/L (ref 1–40)
BASOPHILS # BLD AUTO: 0.06 10*3/MM3 (ref 0–0.2)
BASOPHILS NFR BLD AUTO: 1.2 % (ref 0–1.5)
BILIRUB SERPL-MCNC: 0.7 MG/DL (ref 0–1.2)
BUN SERPL-MCNC: 18 MG/DL (ref 8–23)
BUN/CREAT SERPL: 15 (ref 7–25)
CALCIUM SERPL-MCNC: 10.7 MG/DL (ref 8.6–10.5)
CHLORIDE SERPL-SCNC: 103 MMOL/L (ref 98–107)
CHOLEST SERPL-MCNC: 190 MG/DL (ref 0–200)
CO2 SERPL-SCNC: 27.2 MMOL/L (ref 22–29)
CREAT SERPL-MCNC: 1.2 MG/DL (ref 0.76–1.27)
EOSINOPHIL # BLD AUTO: 0.24 10*3/MM3 (ref 0–0.4)
EOSINOPHIL NFR BLD AUTO: 4.7 % (ref 0.3–6.2)
ERYTHROCYTE [DISTWIDTH] IN BLOOD BY AUTOMATED COUNT: 12.8 % (ref 12.3–15.4)
GLOBULIN SER CALC-MCNC: 2.3 GM/DL
GLUCOSE SERPL-MCNC: 173 MG/DL (ref 65–99)
HBA1C MFR BLD: 7.3 % (ref 4.8–5.6)
HCT VFR BLD AUTO: 41.4 % (ref 37.5–51)
HDLC SERPL-MCNC: 46 MG/DL (ref 40–60)
HGB BLD-MCNC: 13.8 G/DL (ref 13–17.7)
IMM GRANULOCYTES # BLD AUTO: 0.03 10*3/MM3 (ref 0–0.05)
IMM GRANULOCYTES NFR BLD AUTO: 0.6 % (ref 0–0.5)
LDLC SERPL CALC-MCNC: 104 MG/DL (ref 0–100)
LYMPHOCYTES # BLD AUTO: 1.47 10*3/MM3 (ref 0.7–3.1)
LYMPHOCYTES NFR BLD AUTO: 28.7 % (ref 19.6–45.3)
MCH RBC QN AUTO: 31.6 PG (ref 26.6–33)
MCHC RBC AUTO-ENTMCNC: 33.3 G/DL (ref 31.5–35.7)
MCV RBC AUTO: 94.7 FL (ref 79–97)
MICROALBUMIN UR-MCNC: 33.1 UG/ML
MONOCYTES # BLD AUTO: 0.51 10*3/MM3 (ref 0.1–0.9)
MONOCYTES NFR BLD AUTO: 10 % (ref 5–12)
NEUTROPHILS # BLD AUTO: 2.81 10*3/MM3 (ref 1.7–7)
NEUTROPHILS NFR BLD AUTO: 54.8 % (ref 42.7–76)
NRBC BLD AUTO-RTO: 0 /100 WBC (ref 0–0.2)
PLATELET # BLD AUTO: 211 10*3/MM3 (ref 140–450)
POTASSIUM SERPL-SCNC: 4.3 MMOL/L (ref 3.5–5.2)
PROT SERPL-MCNC: 6.7 G/DL (ref 6–8.5)
RBC # BLD AUTO: 4.37 10*6/MM3 (ref 4.14–5.8)
SODIUM SERPL-SCNC: 140 MMOL/L (ref 136–145)
TRIGL SERPL-MCNC: 236 MG/DL (ref 0–150)
TSH SERPL DL<=0.005 MIU/L-ACNC: 1.48 UIU/ML (ref 0.27–4.2)
VIT B12 SERPL-MCNC: >2000 PG/ML (ref 211–946)
VLDLC SERPL CALC-MCNC: 40 MG/DL (ref 5–40)
WBC # BLD AUTO: 5.12 10*3/MM3 (ref 3.4–10.8)

## 2021-04-30 ENCOUNTER — OFFICE VISIT (OUTPATIENT)
Dept: INTERNAL MEDICINE | Facility: CLINIC | Age: 76
End: 2021-04-30

## 2021-04-30 VITALS
BODY MASS INDEX: 28.44 KG/M2 | HEIGHT: 72 IN | SYSTOLIC BLOOD PRESSURE: 124 MMHG | HEART RATE: 57 BPM | WEIGHT: 210 LBS | TEMPERATURE: 97.7 F | DIASTOLIC BLOOD PRESSURE: 80 MMHG | OXYGEN SATURATION: 95 % | RESPIRATION RATE: 16 BRPM

## 2021-04-30 DIAGNOSIS — G60.9 IDIOPATHIC PERIPHERAL NEUROPATHY: ICD-10-CM

## 2021-04-30 DIAGNOSIS — G25.81 RLS (RESTLESS LEGS SYNDROME): Primary | ICD-10-CM

## 2021-04-30 DIAGNOSIS — E11.42 TYPE 2 DIABETES, CONTROLLED, WITH PERIPHERAL NEUROPATHY (HCC): ICD-10-CM

## 2021-04-30 PROCEDURE — 99214 OFFICE O/P EST MOD 30 MIN: CPT | Performed by: INTERNAL MEDICINE

## 2021-04-30 RX ORDER — ROPINIROLE 0.25 MG/1
0.25 TABLET, FILM COATED ORAL NIGHTLY
Qty: 30 TABLET | Refills: 5 | Status: SHIPPED | OUTPATIENT
Start: 2021-04-30 | End: 2022-05-06

## 2021-05-08 LAB
ALBUMIN SERPL ELPH-MCNC: 3.7 G/DL (ref 2.9–4.4)
ALBUMIN/GLOB SERPL: 1.3 {RATIO} (ref 0.7–1.7)
ALPHA1 GLOB SERPL ELPH-MCNC: 0.2 G/DL (ref 0–0.4)
ALPHA2 GLOB SERPL ELPH-MCNC: 0.8 G/DL (ref 0.4–1)
B-GLOBULIN SERPL ELPH-MCNC: 1.1 G/DL (ref 0.7–1.3)
FERRITIN SERPL-MCNC: 333 NG/ML (ref 30–400)
GAMMA GLOB SERPL ELPH-MCNC: 0.9 G/DL (ref 0.4–1.8)
GLOBULIN SER CALC-MCNC: 2.9 G/DL (ref 2.2–3.9)
IRON SATN MFR SERPL: 32 % (ref 20–50)
IRON SERPL-MCNC: 107 MCG/DL (ref 59–158)
LABORATORY COMMENT REPORT: NORMAL
M PROTEIN SERPL ELPH-MCNC: NORMAL G/DL
PROT PATTERN SERPL ELPH-IMP: NORMAL
PROT SERPL-MCNC: 6.6 G/DL (ref 6–8.5)
TIBC SERPL-MCNC: 337 MCG/DL
UIBC SERPL-MCNC: 230 MCG/DL (ref 112–346)
VIT B6 SERPL-MCNC: 4.3 UG/L (ref 5.3–46.7)

## 2021-05-10 RX ORDER — MULTIVITAMIN WITH IRON
1 TABLET ORAL DAILY
Qty: 30 TABLET | Refills: 11 | Status: SHIPPED | OUTPATIENT
Start: 2021-05-10 | End: 2022-05-10

## 2021-05-11 DIAGNOSIS — R97.20 ELEVATED PROSTATE SPECIFIC ANTIGEN (PSA): Primary | ICD-10-CM

## 2021-05-12 ENCOUNTER — LAB (OUTPATIENT)
Dept: UROLOGY | Facility: CLINIC | Age: 76
End: 2021-05-12

## 2021-05-12 DIAGNOSIS — C61 PROSTATE CANCER (HCC): Primary | ICD-10-CM

## 2021-05-13 LAB — PSA SERPL-MCNC: 1.02 NG/ML (ref 0–4)

## 2021-05-17 NOTE — TELEPHONE ENCOUNTER
Caller: Fazal Berg III    Relationship: Self    Best call back number: [phone]    What form or medical record are you requesting: PAPERWORK FOR DIABETIC SHOES    Who is requesting this form or medical record from you: DR BARRERA    How would you like to receive the form or medical records (pick-up, mail, fax)  If pick-up, provide patient with address and location details    Timeframe paperwork needed: ASAP    Additional notes: PATIENT DROPPED OFF PAPERWORK LAST WEEK FOR DIABETIC SHOES AND WAS CHECKING THE STATUS OF IT. PLEASE CALL PATIENT BACK

## 2021-05-19 ENCOUNTER — OFFICE VISIT (OUTPATIENT)
Dept: UROLOGY | Facility: CLINIC | Age: 76
End: 2021-05-19

## 2021-05-19 VITALS
HEART RATE: 72 BPM | HEIGHT: 72 IN | OXYGEN SATURATION: 98 % | TEMPERATURE: 98.2 F | BODY MASS INDEX: 28.44 KG/M2 | SYSTOLIC BLOOD PRESSURE: 128 MMHG | DIASTOLIC BLOOD PRESSURE: 76 MMHG | WEIGHT: 210 LBS

## 2021-05-19 DIAGNOSIS — C61 PROSTATE CANCER (HCC): Primary | ICD-10-CM

## 2021-05-19 DIAGNOSIS — N40.1 BENIGN NON-NODULAR PROSTATIC HYPERPLASIA WITH LOWER URINARY TRACT SYMPTOMS: ICD-10-CM

## 2021-05-19 LAB
BILIRUB BLD-MCNC: NEGATIVE MG/DL
CLARITY, POC: CLEAR
COLOR UR: YELLOW
GLUCOSE UR STRIP-MCNC: NEGATIVE MG/DL
KETONES UR QL: NEGATIVE
LEUKOCYTE EST, POC: NEGATIVE
NITRITE UR-MCNC: NEGATIVE MG/ML
PH UR: 6 [PH] (ref 5–8)
PROT UR STRIP-MCNC: NEGATIVE MG/DL
RBC # UR STRIP: NEGATIVE /UL
SP GR UR: 1.02 (ref 1–1.03)
UROBILINOGEN UR QL: NORMAL

## 2021-05-19 PROCEDURE — 99213 OFFICE O/P EST LOW 20 MIN: CPT | Performed by: UROLOGY

## 2021-05-19 PROCEDURE — 81003 URINALYSIS AUTO W/O SCOPE: CPT | Performed by: UROLOGY

## 2021-05-19 RX ORDER — MULTIVITAMIN WITH IRON
1 TABLET ORAL DAILY
Qty: 30 TABLET | Refills: 11 | OUTPATIENT
Start: 2021-05-19 | End: 2022-05-19

## 2021-05-19 RX ORDER — SITAGLIPTIN 100 MG/1
TABLET, FILM COATED ORAL
Qty: 90 TABLET | Refills: 3 | Status: SHIPPED | OUTPATIENT
Start: 2021-05-19 | End: 2022-04-14

## 2021-05-19 NOTE — PROGRESS NOTES
"Chief Complaint  Prostate Cancer        TIFFANY Berg is a 76 y.o. male who returns today for routine checkup generally doing well.  He states he caught Covid when he was in [place] but had a mild case and has subsequently received the Pfizer vaccine.    He has a distant history of prostate cancer that was found when it was early stage and low-grade and has been managed for years ago a program of active surveillance.  He returns today with no change in his voiding pattern and his main symptom is frequency and nocturia associated with drinking large amounts of fatigue.  He is a diabetic with peripheral neuropathy and I note his urine today is negative for protein.  He is taking vitamin B complex for the above.  He does continue his daily Proscar.  Vitals:    05/19/21 1055   BP: 128/76   Pulse: 72   Temp: 98.2 °F (36.8 °C)   SpO2: 98%       Past Medical History  Past Medical History:   Diagnosis Date   • Acute asthma exacerbation    • Acute sinusitis    • Acute URI    • Allergic rhinitis    • Anemia    • Arthritis    • Asthma    • Benign prostatic hypertrophy    • Bronchitis    • Chest pain     \"I'VE SEEN DR. CARRASCO FOR IT A LONG TIME AGO, AND THAT'S WHEN HE DID TESTING\".  STATES HAS NOT MENTIONED IT TO DR. KHAN.  STATES UNSURE OF WHEN LAST TIME WAS\".  PT STATES \"I'M NOT A COMPLAINER\".  STATES COLD WEATHER AND WALKING BRING IT ON.  GETS SHORTNESS OF BREATH OCCASSIONALLY.  MORE DESCRIBED AS PRESSURE.    • ED (erectile dysfunction)    • Elevated cholesterol    • Family history of deafness and hearing loss    • Full dentures     INSTRUCTED NO ADHESIVES THE DOS   • GERD (gastroesophageal reflux disease)    • H/O echocardiogram 2003   • Hiatal hernia    • History of fracture     REPORTS TOE ON RIGHT FOOT - NO SURGICAL INTERVENTION REQUIRED   • [place] (hard of hearing)     SPOUSE REPORTS NO USE OF HEARING AIDS   • Hx of exercise stress test     SPOUSE REPORTS LAST DONE BY DR ALMANZA AROUND JUNE 2018 AND REPORTED ALL WNL'S " "AND MD RELEASED PATIENT   • Hyperlipidemia    • Hypertension    • Jaundice     AS A TEEN    • Obstructive sleep apnea     NO CPAP - SPOUSE REPORTS \"HE WOULDN'T DO IT\"   • Peripheral neuropathy    • Pneumonia     SPOUSE REPORTS HISTORY   • Prostate cancer (CMS/HCC)    • Short of breath on exertion    • Skin cancer    • Type 2 diabetes mellitus (CMS/HCC)    • Urinary frequency    • Vitamin B 12 deficiency    • Vitamin D deficiency    • Wears glasses        Past Surgical History  Past Surgical History:   Procedure Laterality Date   • APPENDECTOMY     • CARDIAC CATHETERIZATION  2003?    \"IT WAS OK\"   • CHOLECYSTECTOMY  06/2018   • CHOLECYSTECTOMY WITH INTRAOPERATIVE CHOLANGIOGRAM N/A 5/15/2018    Procedure: CHOLECYSTECTOMY LAPAROSCOPIC INTRAOPERATIVE CHOLANGIOGRAM;  Surgeon: Eric Bran MD;  Location: [facility] OR;  Service: General   • COLONOSCOPY     • COLONOSCOPY N/A 8/8/2018    Procedure: COLONOSCOPY;  Surgeon: Eric Bran MD;  Location: [facility] ENDOSCOPY;  Service: Gastroenterology   • ENDOSCOPY     • HAND SURGERY Left    • KNEE ARTHROSCOPY Left    • MOUTH SURGERY      FULL MOUTH EXTRACTION   • SKIN CANCER EXCISION  07/22/2019    skin cancer on head       Medications  has a current medication list which includes the following prescription(s): accu-chek softclix lancets, albuterol sulfate hfa, aspirin, b-complex with vitamin c, bisacodyl, blood gluc meter disp-strips, surechek control solution, accu-chek jose m plus, vitamin d, chromium-cinnamon, finasteride, glimepiride, glucose blood, losartan-hydrochlorothiazide, metformin, ropinirole, sitagliptin, and welchol.      Allergies  Allergies   Allergen Reactions   • Statins Myalgia       Social History  Social History     Socioeconomic History   • Marital status:      Spouse name: Not on file   • Number of children: Not on file   • Years of education: Not on file   • Highest education level: Not on file   Tobacco Use   • Smoking status: Never Smoker   • " Smokeless tobacco: Never Used   Substance and Sexual Activity   • Alcohol use: No   • Drug use: No   • Sexual activity: Defer       Family History  He has no family history of bladder or kidney cancer  He has no family history of kidney stones      AUA Symptom Score:      Review of Systems  Review of Systems   Constitutional: Negative for activity change, appetite change, chills, fatigue, fever, unexpected weight gain and unexpected weight loss.   Respiratory: Negative for apnea, cough, chest tightness, shortness of breath, wheezing and stridor.    Cardiovascular: Negative for chest pain, palpitations and leg swelling.   Gastrointestinal: Negative for abdominal distention, abdominal pain, anal bleeding, blood in stool, constipation, diarrhea, nausea, rectal pain, vomiting, GERD and indigestion.   Genitourinary: Positive for frequency and nocturia. Negative for decreased libido, decreased urine volume, difficulty urinating, discharge, dysuria, flank pain, genital sores, hematuria, penile pain, erectile dysfunction, penile swelling, scrotal swelling, testicular pain, urgency and urinary incontinence.   Musculoskeletal: Negative for back pain and joint swelling.   Neurological: Negative for tremors, seizures, speech difficulty, weakness and numbness.   Psychiatric/Behavioral: Negative for agitation, decreased concentration, sleep disturbance, depressed mood and stress. The patient is not nervous/anxious.        Physical Exam  Physical Exam  Vitals reviewed.   Constitutional:       Appearance: He is well-developed.   HENT:      Head: Normocephalic and atraumatic.   Pulmonary:      Effort: Pulmonary effort is normal. No respiratory distress.   Abdominal:      General: There is no distension.      Palpations: Abdomen is soft. There is no mass.      Tenderness: There is no abdominal tenderness.      Hernia: No hernia is present.   Genitourinary:     Prostate: Normal.      Rectum: Normal.   Musculoskeletal:         General:  Normal range of motion.      Cervical back: Normal range of motion.   Lymphadenopathy:      Cervical: No cervical adenopathy.   Skin:     General: Skin is warm and dry.   Neurological:      Mental Status: He is alert and oriented to person, place, and time.   Psychiatric:         Behavior: Behavior normal.         Labs Recent and today in the office:  Results for orders placed or performed in visit on 05/19/21   POC Urinalysis Dipstick, Automated    Specimen: Urine   Result Value Ref Range    Color Yellow Yellow, Straw, Dark Yellow, Mary    Clarity, UA Clear Clear    Specific Gravity  1.025 1.005 - 1.030    pH, Urine 6.0 5.0 - 8.0    Leukocytes Negative Negative    Nitrite, UA Negative Negative    Protein, POC Negative Negative mg/dL    Glucose, UA Negative Negative, 1000 mg/dL (3+) mg/dL    Ketones, UA Negative Negative    Urobilinogen, UA Normal Normal    Bilirubin Negative Negative    Blood, UA Negative Negative         Assessment & Plan  Prostate cancer: Diagnosed years ago and has been managed on a program of active surveillance without progression.  Recent PSA was 1 and digital rectal exam reveals a flat smooth small prostate without nodularity.  He is encouraged to continue the daily Proscar and eat more plant-based heart healthy diet.  He will return in 6 months around his usual wintering in [place].

## 2021-05-27 DIAGNOSIS — E11.42 TYPE 2 DIABETES, CONTROLLED, WITH PERIPHERAL NEUROPATHY (HCC): ICD-10-CM

## 2021-05-27 NOTE — TELEPHONE ENCOUNTER
Caller: Fazal Berg III    Relationship: Self    Best call back number: [phone]    Medication needed:   Requested Prescriptions     Pending Prescriptions Disp Refills   • metFORMIN (GLUCOPHAGE) 500 MG tablet       Sig: Take 1 tablet by mouth 2 (Two) Times a Day With Meals.       When do you need the refill by: TODAY    What additional details did the patient provide when requesting the medication: PATIENT HAS TWO DOSES LEFT    Does the patient have less than a 3 day supply:  [x] Yes  [] No    What is the patient's preferred pharmacy: [facility] - [facility] [address] - [phone]  - [phone] FX

## 2021-07-28 RX ORDER — GLIMEPIRIDE 4 MG/1
TABLET ORAL
Qty: 90 TABLET | Refills: 3 | Status: SHIPPED | OUTPATIENT
Start: 2021-07-28 | End: 2022-05-06 | Stop reason: SDUPTHER

## 2021-07-30 ENCOUNTER — OFFICE VISIT (OUTPATIENT)
Dept: INTERNAL MEDICINE | Facility: CLINIC | Age: 76
End: 2021-07-30

## 2021-07-30 VITALS
HEIGHT: 72 IN | SYSTOLIC BLOOD PRESSURE: 134 MMHG | HEART RATE: 68 BPM | WEIGHT: 205 LBS | OXYGEN SATURATION: 99 % | TEMPERATURE: 97.3 F | BODY MASS INDEX: 27.77 KG/M2 | RESPIRATION RATE: 16 BRPM | DIASTOLIC BLOOD PRESSURE: 77 MMHG

## 2021-07-30 DIAGNOSIS — E11.9 TYPE 2 DIABETES MELLITUS WITHOUT COMPLICATION, WITHOUT LONG-TERM CURRENT USE OF INSULIN (HCC): Primary | ICD-10-CM

## 2021-07-30 DIAGNOSIS — R26.89 LOSS OF BALANCE: ICD-10-CM

## 2021-07-30 PROCEDURE — 99214 OFFICE O/P EST MOD 30 MIN: CPT | Performed by: INTERNAL MEDICINE

## 2021-07-30 NOTE — PROGRESS NOTES
Subjective     Patient ID: Fazal Berg III is a 76 y.o. male. Patient is here for management of multiple medical problems.     Chief Complaint   Patient presents with   • Follow-up     diabetes      History of Present Illness       Dm    Loss of balance.                The following portions of the patient's history were reviewed and updated as appropriate: allergies, current medications, past family history, past medical history, past social history, past surgical history and problem list.    Review of Systems   Psychiatric/Behavioral: Negative for self-injury and sleep disturbance.   All other systems reviewed and are negative.      Current Outpatient Medications:   •  ACCU-CHEK SOFTCLIX LANCETS lancets, 1 each by Other route 3 (Three) Times a Day. Use as instructed, Disp: 300 each, Rfl: 3  •  albuterol (VENTOLIN HFA) 108 (90 BASE) MCG/ACT inhaler, Ventolin  (90 Base) MCG/ACT Inhalation Aerosol Solution; Patient Sig: Ventolin  (90 Base) MCG/ACT Inhalation Aerosol Solution INHALE 1 TO 2 PUFFS EVERY 4 TO 6 HOURS AS NEEDED.; 1; 11; 18-Aug-2015; Active, Disp: , Rfl:   •  aspirin 81 MG EC tablet, Take 81 mg by mouth Daily., Disp: , Rfl:   •  B Complex-C (B-complex with vitamin C) tablet, Take 1 tablet by mouth Daily., Disp: 30 tablet, Rfl: 11  •  bisacodyl (bisacodyl) 5 MG EC tablet, Take 1 tablet by mouth Daily., Disp: 4 tablet, Rfl: 0  •  Blood Gluc Meter Disp-Strips device, 1 strip 3 (Three) Times a Day. Patient needs Accu-chek meter and test strips., Disp: 1 each, Rfl: 0  •  Blood Glucose Calibration (SURECHEK CONTROL SOLUTION) NORMAL liquid, 1 bottle by In Vitro route every 30 (thirty) days., Disp: 3 each, Rfl: 3  •  Blood Glucose Monitoring Suppl (Accu-Chek Alondra Plus) w/Device kit, for testing sugar daily, Disp: 1 kit, Rfl: 0  •  Cholecalciferol (VITAMIN D) 2000 UNITS tablet, Take 1 capsule by mouth 3 (Three) Times a Week., Disp: , Rfl:   •  Chromium-Cinnamon (CINNAMON PLUS CHROMIUM) 100-500  "MCG-MG capsule, Take 2 capsules by mouth 2 (two) times a day., Disp: , Rfl:   •  finasteride (PROSCAR) 5 MG tablet, TAKE 1 TABLET EVERY DAY, Disp: 90 tablet, Rfl: 3  •  glimepiride (AMARYL) 4 MG tablet, TAKE 1 TABLET EVERY DAY, Disp: 90 tablet, Rfl: 3  •  glucose blood (Accu-Chek Alondra) test strip, 1 each by Other route Daily. Use as instructed, Disp: 100 each, Rfl: 3  •  Januvia 100 MG tablet, TAKE 1 TABLET EVERY DAY, Disp: 90 tablet, Rfl: 3  •  losartan-hydrochlorothiazide (HYZAAR) 50-12.5 MG per tablet, TAKE 1 TABLET EVERY DAY, Disp: 90 tablet, Rfl: 3  •  metFORMIN (GLUCOPHAGE) 500 MG tablet, Take 1 tablet by mouth 2 (Two) Times a Day With Meals., Disp: 180 tablet, Rfl: 3  •  rOPINIRole (Requip) 0.25 MG tablet, Take 1 tablet by mouth Every Night. Take 1 hour before bedtime., Disp: 30 tablet, Rfl: 5  •  WELCHOL 625 MG tablet, TAKE 1 TABLET TWICE DAILY, Disp: 180 tablet, Rfl: 3    Objective      Blood pressure 134/77, pulse 68, temperature 97.3 °F (36.3 °C), temperature source Temporal, resp. rate 16, height 182.9 cm (72\"), weight 93 kg (205 lb), SpO2 99 %.    Physical Exam     General Appearance:    Alert, cooperative, no distress, appears stated age   Head:    Normocephalic, without obvious abnormality, atraumatic   Eyes:    PERRL, conjunctiva/corneas clear, EOM's intact   Ears:    Normal TM's and external ear canals, both ears   Nose:   Nares normal, septum midline, mucosa normal, no drainage   or sinus tenderness   Throat:   Lips, mucosa, and tongue normal; teeth and gums normal   Neck:   Supple, symmetrical, trachea midline, no adenopathy;        thyroid:  No enlargement/tenderness/nodules; no carotid    bruit or JVD   Back:     Symmetric, no curvature, ROM normal, no CVA tenderness   Lungs:     Clear to auscultation bilaterally, respirations unlabored   Chest wall:    No tenderness or deformity   Heart:    Regular rate and rhythm, S1 and S2 normal, no murmur,        rub or gallop   Abdomen:     Soft, " non-tender, bowel sounds active all four quadrants,     no masses, no organomegaly   Extremities:   Extremities normal, atraumatic, no cyanosis or edema   Pulses:   2+ and symmetric all extremities   Skin:   Skin color, texture, turgor normal, no rashes or lesions   Lymph nodes:   Cervical, supraclavicular, and axillary nodes normal   Neurologic:   CNII-XII intact. Normal strength, sensation and reflexes       throughout      Results for orders placed or performed in visit on 05/19/21   POC Urinalysis Dipstick, Automated    Specimen: Urine   Result Value Ref Range    Color Yellow Yellow, Straw, Dark Yellow, Mary    Clarity, UA Clear Clear    Specific Gravity  1.025 1.005 - 1.030    pH, Urine 6.0 5.0 - 8.0    Leukocytes Negative Negative    Nitrite, UA Negative Negative    Protein, POC Negative Negative mg/dL    Glucose, UA Negative Negative, 1000 mg/dL (3+) mg/dL    Ketones, UA Negative Negative    Urobilinogen, UA Normal Normal    Bilirubin Negative Negative    Blood, UA Negative Negative         Assessment/Plan     Loss of balance stable.  Was on zinc for covid  Low vit b6    Will check labs to see if this is resolved.          Diagnoses and all orders for this visit:    1. Type 2 diabetes mellitus without complication, without long-term current use of insulin (CMS/[facility]) (Primary)  -     Comprehensive Metabolic Panel  -     TSH  -     T4, Free  -     Vitamin B12  -     CBC & Differential  -     Lipid Panel  -     Hemoglobin A1c  -     MicroAlbumin, Urine, Random - Urine, Clean Catch  -     Copper, Free    2. Loss of balance  -     Comprehensive Metabolic Panel  -     TSH  -     T4, Free  -     Vitamin B12  -     CBC & Differential  -     Lipid Panel  -     Hemoglobin A1c  -     MicroAlbumin, Urine, Random - Urine, Clean Catch  -     Copper, Free      Return in about 3 months (around 10/30/2021).          There are no Patient Instructions on file for this visit.     Matthew Carr MD    Assessment/Plan

## 2021-08-11 LAB
ALBUMIN SERPL-MCNC: 4.2 G/DL (ref 3.5–5.2)
ALBUMIN/GLOB SERPL: 1.8 G/DL
ALP SERPL-CCNC: 66 U/L (ref 39–117)
ALT SERPL-CCNC: 18 U/L (ref 1–41)
AST SERPL-CCNC: 21 U/L (ref 1–40)
BASOPHILS # BLD AUTO: 0.06 10*3/MM3 (ref 0–0.2)
BASOPHILS NFR BLD AUTO: 1 % (ref 0–1.5)
BILIRUB SERPL-MCNC: 0.4 MG/DL (ref 0–1.2)
BUN SERPL-MCNC: 14 MG/DL (ref 8–23)
BUN/CREAT SERPL: 11.6 (ref 7–25)
CALCIUM SERPL-MCNC: 10.4 MG/DL (ref 8.6–10.5)
CHLORIDE SERPL-SCNC: 104 MMOL/L (ref 98–107)
CHOLEST SERPL-MCNC: 203 MG/DL (ref 0–200)
CO2 SERPL-SCNC: 29.3 MMOL/L (ref 22–29)
COPPER SERPL-MCNC: 340 MCG/L
CREAT SERPL-MCNC: 1.21 MG/DL (ref 0.76–1.27)
EOSINOPHIL # BLD AUTO: 0.26 10*3/MM3 (ref 0–0.4)
EOSINOPHIL NFR BLD AUTO: 4.5 % (ref 0.3–6.2)
ERYTHROCYTE [DISTWIDTH] IN BLOOD BY AUTOMATED COUNT: 12.2 % (ref 12.3–15.4)
GLOBULIN SER CALC-MCNC: 2.4 GM/DL
GLUCOSE SERPL-MCNC: 173 MG/DL (ref 65–99)
HBA1C MFR BLD: 6.8 % (ref 4.8–5.6)
HCT VFR BLD AUTO: 42 % (ref 37.5–51)
HDLC SERPL-MCNC: 41 MG/DL (ref 40–60)
HGB BLD-MCNC: 13.8 G/DL (ref 13–17.7)
IMM GRANULOCYTES # BLD AUTO: 0.05 10*3/MM3 (ref 0–0.05)
IMM GRANULOCYTES NFR BLD AUTO: 0.9 % (ref 0–0.5)
LDLC SERPL CALC-MCNC: 114 MG/DL (ref 0–100)
LYMPHOCYTES # BLD AUTO: 1.73 10*3/MM3 (ref 0.7–3.1)
LYMPHOCYTES NFR BLD AUTO: 29.7 % (ref 19.6–45.3)
MCH RBC QN AUTO: 31.5 PG (ref 26.6–33)
MCHC RBC AUTO-ENTMCNC: 32.9 G/DL (ref 31.5–35.7)
MCV RBC AUTO: 95.9 FL (ref 79–97)
MICROALBUMIN UR-MCNC: 45.8 UG/ML
MONOCYTES # BLD AUTO: 0.63 10*3/MM3 (ref 0.1–0.9)
MONOCYTES NFR BLD AUTO: 10.8 % (ref 5–12)
NEUTROPHILS # BLD AUTO: 3.09 10*3/MM3 (ref 1.7–7)
NEUTROPHILS NFR BLD AUTO: 53.1 % (ref 42.7–76)
NRBC BLD AUTO-RTO: 0 /100 WBC (ref 0–0.2)
PLATELET # BLD AUTO: 229 10*3/MM3 (ref 140–450)
POTASSIUM SERPL-SCNC: 4.7 MMOL/L (ref 3.5–5.2)
PROT SERPL-MCNC: 6.6 G/DL (ref 6–8.5)
RBC # BLD AUTO: 4.38 10*6/MM3 (ref 4.14–5.8)
SODIUM SERPL-SCNC: 140 MMOL/L (ref 136–145)
T4 FREE SERPL-MCNC: 1.13 NG/DL (ref 0.93–1.7)
TRIGL SERPL-MCNC: 278 MG/DL (ref 0–150)
TSH SERPL DL<=0.005 MIU/L-ACNC: 1.06 UIU/ML (ref 0.27–4.2)
VIT B12 SERPL-MCNC: 1793 PG/ML (ref 211–946)
VLDLC SERPL CALC-MCNC: 48 MG/DL (ref 5–40)
WBC # BLD AUTO: 5.82 10*3/MM3 (ref 3.4–10.8)

## 2021-08-17 ENCOUNTER — TELEPHONE (OUTPATIENT)
Dept: INTERNAL MEDICINE | Facility: CLINIC | Age: 76
End: 2021-08-17

## 2021-11-04 ENCOUNTER — TELEPHONE (OUTPATIENT)
Dept: INTERNAL MEDICINE | Facility: CLINIC | Age: 76
End: 2021-11-04

## 2021-11-04 NOTE — TELEPHONE ENCOUNTER
Caller: ZION    Relationship: STACIE    Best call back number: [phone]    Who are you requesting to speak with (clinical staff, provider,  specific staff member): CLINICAL STAFF    What was the call regarding: ZION STATED THE PATIENT IS DIABETIC AND NOT CURRENTLY TAKING A STATIN. ZION WOULD LIKE TO KNOW IF THIS IS SOMETHING THAT COULD BE PRESCRIBED TO THE PATIENT.    Do you require a callback: YES

## 2021-11-08 ENCOUNTER — OFFICE VISIT (OUTPATIENT)
Dept: INTERNAL MEDICINE | Facility: CLINIC | Age: 76
End: 2021-11-08

## 2021-11-08 VITALS
HEIGHT: 72 IN | BODY MASS INDEX: 28.33 KG/M2 | DIASTOLIC BLOOD PRESSURE: 87 MMHG | OXYGEN SATURATION: 100 % | WEIGHT: 209.12 LBS | TEMPERATURE: 97.1 F | HEART RATE: 83 BPM | SYSTOLIC BLOOD PRESSURE: 178 MMHG

## 2021-11-08 DIAGNOSIS — I10 HYPERTENSION, UNSPECIFIED TYPE: ICD-10-CM

## 2021-11-08 DIAGNOSIS — E11.42 TYPE 2 DIABETES, CONTROLLED, WITH PERIPHERAL NEUROPATHY (HCC): Primary | ICD-10-CM

## 2021-11-08 DIAGNOSIS — E53.1 VITAMIN B6 DEFICIENCY: ICD-10-CM

## 2021-11-08 PROCEDURE — 0003A COVID-19 (PFIZER): CPT | Performed by: INTERNAL MEDICINE

## 2021-11-08 PROCEDURE — G0008 ADMIN INFLUENZA VIRUS VAC: HCPCS | Performed by: INTERNAL MEDICINE

## 2021-11-08 PROCEDURE — 99214 OFFICE O/P EST MOD 30 MIN: CPT | Performed by: INTERNAL MEDICINE

## 2021-11-08 PROCEDURE — 90662 IIV NO PRSV INCREASED AG IM: CPT | Performed by: INTERNAL MEDICINE

## 2021-11-08 PROCEDURE — 91300 COVID-19 (PFIZER): CPT | Performed by: INTERNAL MEDICINE

## 2021-11-08 NOTE — PROGRESS NOTES
Subjective     Patient ID: Fazal Berg III is a 76 y.o. male. Patient is here for management of multiple medical problems.     Chief Complaint   Patient presents with   • Follow-up     6 month follow up diabetes, HTN,    • feet pain     neuropathy     History of Present Illness       Dm      HTN      Feet pain  Neuropathy.           The following portions of the patient's history were reviewed and updated as appropriate: allergies, current medications, past family history, past medical history, past social history, past surgical history and problem list.    Review of Systems   Constitutional: Negative.    HENT: Negative.    Cardiovascular: Negative.    Musculoskeletal: Negative.    All other systems reviewed and are negative.      Current Outpatient Medications:   •  ACCU-CHEK SOFTCLIX LANCETS lancets, 1 each by Other route 3 (Three) Times a Day. Use as instructed, Disp: 300 each, Rfl: 3  •  albuterol (VENTOLIN HFA) 108 (90 BASE) MCG/ACT inhaler, Ventolin  (90 Base) MCG/ACT Inhalation Aerosol Solution; Patient Sig: Ventolin  (90 Base) MCG/ACT Inhalation Aerosol Solution INHALE 1 TO 2 PUFFS EVERY 4 TO 6 HOURS AS NEEDED.; 1; 11; 18-Aug-2015; Active, Disp: , Rfl:   •  aspirin 81 MG EC tablet, Take 81 mg by mouth Daily., Disp: , Rfl:   •  B Complex-C (B-complex with vitamin C) tablet, Take 1 tablet by mouth Daily., Disp: 30 tablet, Rfl: 11  •  bisacodyl (bisacodyl) 5 MG EC tablet, Take 1 tablet by mouth Daily., Disp: 4 tablet, Rfl: 0  •  Blood Gluc Meter Disp-Strips device, 1 strip 3 (Three) Times a Day. Patient needs Accu-chek meter and test strips., Disp: 1 each, Rfl: 0  •  Blood Glucose Calibration (SURECHEK CONTROL SOLUTION) NORMAL liquid, 1 bottle by In Vitro route every 30 (thirty) days., Disp: 3 each, Rfl: 3  •  Blood Glucose Monitoring Suppl (Accu-Chek Alondra Plus) w/Device kit, for testing sugar daily, Disp: 1 kit, Rfl: 0  •  Cholecalciferol (VITAMIN D) 2000 UNITS tablet, Take 1 capsule by  "mouth 3 (Three) Times a Week., Disp: , Rfl:   •  Chromium-Cinnamon (CINNAMON PLUS CHROMIUM) 100-500 MCG-MG capsule, Take 2 capsules by mouth 2 (two) times a day., Disp: , Rfl:   •  finasteride (PROSCAR) 5 MG tablet, TAKE 1 TABLET EVERY DAY, Disp: 90 tablet, Rfl: 3  •  glimepiride (AMARYL) 4 MG tablet, TAKE 1 TABLET EVERY DAY, Disp: 90 tablet, Rfl: 3  •  glucose blood (Accu-Chek Alondra) test strip, 1 each by Other route Daily. Use as instructed, Disp: 100 each, Rfl: 3  •  Januvia 100 MG tablet, TAKE 1 TABLET EVERY DAY, Disp: 90 tablet, Rfl: 3  •  losartan-hydrochlorothiazide (HYZAAR) 50-12.5 MG per tablet, TAKE 1 TABLET EVERY DAY, Disp: 90 tablet, Rfl: 3  •  metFORMIN (GLUCOPHAGE) 500 MG tablet, Take 1 tablet by mouth 2 (Two) Times a Day With Meals., Disp: 180 tablet, Rfl: 3  •  rOPINIRole (Requip) 0.25 MG tablet, Take 1 tablet by mouth Every Night. Take 1 hour before bedtime., Disp: 30 tablet, Rfl: 5  •  WELCHOL 625 MG tablet, TAKE 1 TABLET TWICE DAILY, Disp: 180 tablet, Rfl: 3    Objective      Blood pressure 178/87, pulse 83, temperature 97.1 °F (36.2 °C), height 182.9 cm (72.01\"), weight 94.9 kg (209 lb 1.9 oz), SpO2 100 %.    Physical Exam     General Appearance:    Alert, cooperative, no distress, appears stated age   Head:    Normocephalic, without obvious abnormality, atraumatic   Eyes:    PERRL, conjunctiva/corneas clear, EOM's intact   Ears:    Normal TM's and external ear canals, both ears   Nose:   Nares normal, septum midline, mucosa normal, no drainage   or sinus tenderness   Throat:   Lips, mucosa, and tongue normal; teeth and gums normal   Neck:   Supple, symmetrical, trachea midline, no adenopathy;        thyroid:  No enlargement/tenderness/nodules; no carotid    bruit or JVD   Back:     Symmetric, no curvature, ROM normal, no CVA tenderness   Lungs:     Clear to auscultation bilaterally, respirations unlabored   Chest wall:    No tenderness or deformity   Heart:    Regular rate and rhythm, S1 and S2 " normal, no murmur,        rub or gallop   Abdomen:     Soft, non-tender, bowel sounds active all four quadrants,     no masses, no organomegaly   Extremities:   Extremities normal, atraumatic, no cyanosis or edema   Pulses:   2+ and symmetric all extremities   Skin:   Skin color, texture, turgor normal, no rashes or lesions   Lymph nodes:   Cervical, supraclavicular, and axillary nodes normal   Neurologic:   CNII-XII intact. Normal strength, sensation and reflexes       throughout      Results for orders placed or performed in visit on 07/30/21   Comprehensive Metabolic Panel    Specimen: Blood   Result Value Ref Range    Glucose 173 (H) 65 - 99 mg/dL    BUN 14 8 - 23 mg/dL    Creatinine 1.21 0.76 - 1.27 mg/dL    eGFR Non African Am 59 (L) >60 mL/min/1.73    eGFR African Am 71 >60 mL/min/1.73    BUN/Creatinine Ratio 11.6 7.0 - 25.0    Sodium 140 136 - 145 mmol/L    Potassium 4.7 3.5 - 5.2 mmol/L    Chloride 104 98 - 107 mmol/L    Total CO2 29.3 (H) 22.0 - 29.0 mmol/L    Calcium 10.4 8.6 - 10.5 mg/dL    Total Protein 6.6 6.0 - 8.5 g/dL    Albumin 4.20 3.50 - 5.20 g/dL    Globulin 2.4 gm/dL    A/G Ratio 1.8 g/dL    Total Bilirubin 0.4 0.0 - 1.2 mg/dL    Alkaline Phosphatase 66 39 - 117 U/L    AST (SGOT) 21 1 - 40 U/L    ALT (SGPT) 18 1 - 41 U/L   TSH    Specimen: Blood   Result Value Ref Range    TSH 1.060 0.270 - 4.200 uIU/mL   T4, Free    Specimen: Blood   Result Value Ref Range    Free T4 1.13 0.93 - 1.70 ng/dL   Vitamin B12    Specimen: Blood   Result Value Ref Range    Vitamin B-12 1,793 (H) 211 - 946 pg/mL   Lipid Panel    Specimen: Blood   Result Value Ref Range    Total Cholesterol 203 (H) 0 - 200 mg/dL    Triglycerides 278 (H) 0 - 150 mg/dL    HDL Cholesterol 41 40 - 60 mg/dL    VLDL Cholesterol Slim 48 (H) 5 - 40 mg/dL    LDL Chol Calc (NIH) 114 (H) 0 - 100 mg/dL   Hemoglobin A1c    Specimen: Blood   Result Value Ref Range    Hemoglobin A1C 6.80 (H) 4.80 - 5.60 %   MicroAlbumin, Urine, Random - Urine, Clean  Catch    Specimen: Urine, Clean Catch   Result Value Ref Range    Microalbumin, Urine 45.8 Not Estab. ug/mL   Copper, Free    Specimen: Blood   Result Value Ref Range    Copper, Free 340 mcg/L   CBC & Differential    Specimen: Blood   Result Value Ref Range    WBC 5.82 3.40 - 10.80 10*3/mm3    RBC 4.38 4.14 - 5.80 10*6/mm3    Hemoglobin 13.8 13.0 - 17.7 g/dL    Hematocrit 42.0 37.5 - 51.0 %    MCV 95.9 79.0 - 97.0 fL    MCH 31.5 26.6 - 33.0 pg    MCHC 32.9 31.5 - 35.7 g/dL    RDW 12.2 (L) 12.3 - 15.4 %    Platelets 229 140 - 450 10*3/mm3    Neutrophil Rel % 53.1 42.7 - 76.0 %    Lymphocyte Rel % 29.7 19.6 - 45.3 %    Monocyte Rel % 10.8 5.0 - 12.0 %    Eosinophil Rel % 4.5 0.3 - 6.2 %    Basophil Rel % 1.0 0.0 - 1.5 %    Neutrophils Absolute 3.09 1.70 - 7.00 10*3/mm3    Lymphocytes Absolute 1.73 0.70 - 3.10 10*3/mm3    Monocytes Absolute 0.63 0.10 - 0.90 10*3/mm3    Eosinophils Absolute 0.26 0.00 - 0.40 10*3/mm3    Basophils Absolute 0.06 0.00 - 0.20 10*3/mm3    Immature Granulocyte Rel % 0.9 (H) 0.0 - 0.5 %    Immature Grans Absolute 0.05 0.00 - 0.05 10*3/mm3    nRBC 0.0 0.0 - 0.2 /100 WBC         Assessment/Plan     Neurpathy: feet burn all over. Copper level elevated. ha1c 6.8  Vit b6 low last time    Feet are atrophied.      Diagnoses and all orders for this visit:    1. Type 2 diabetes, controlled, with peripheral neuropathy (HCC) (Primary)  -     CK  -     Myoglobin, Serum  -     CBC & Differential  -     T4, Free  -     TSH  -     Comprehensive Metabolic Panel  -     Vitamin B12  -     Vitamin B6  -     Protein Elec + Interp, Serum  -     Free K+L Left Chains,Qn,Ur - Urine, Clean Catch  -     Hemoglobin A1c  -     [place] Spotted Fever, IgG  -     [place] Spotted Fever, IgM  -     Lyme Disease, PCR - , Arm, Left  -     Ehrlichia Antibody Panel  -     Rheumatoid Factor  -     Sedimentation Rate  -     Cyclic Citrul Peptide Antibody, IgG / IgA  -     C-reactive Protein  -     Antistreptolysin O  Titer    2. Hypertension, unspecified type  -     CK  -     Myoglobin, Serum  -     CBC & Differential  -     T4, Free  -     TSH  -     Comprehensive Metabolic Panel  -     Vitamin B12  -     Vitamin B6  -     Protein Elec + Interp, Serum  -     Free K+L Left Chains,Qn,Ur - Urine, Clean Catch  -     Hemoglobin A1c  -     [facility] Spotted Fever, IgG  -     [place] Spotted Fever, IgM  -     Lyme Disease, PCR - , Arm, Left  -     Ehrlichia Antibody Panel  -     Rheumatoid Factor  -     Sedimentation Rate  -     Cyclic Citrul Peptide Antibody, IgG / IgA  -     C-reactive Protein  -     Antistreptolysin O Titer    3. Vitamin B6 deficiency  -     CK  -     Myoglobin, Serum  -     CBC & Differential  -     T4, Free  -     TSH  -     Comprehensive Metabolic Panel  -     Vitamin B12  -     Vitamin B6  -     Protein Elec + Interp, Serum  -     Free K+L Left Chains,Qn,Ur - Urine, Clean Catch  -     Hemoglobin A1c  -     [facility] Spotted Fever, IgG  -     [place] Spotted Fever, IgM  -     Lyme Disease, PCR - , Arm, Left  -     Ehrlichia Antibody Panel  -     Rheumatoid Factor  -     Sedimentation Rate  -     Cyclic Citrul Peptide Antibody, IgG / IgA  -     C-reactive Protein  -     Antistreptolysin O Titer    Other orders  -     COVID-19 Vaccine (Pfizer)  -     Fluzone High-Dose 65+yrs ([id number])      Return in about 6 months (around 5/8/2022).     will need neurology consult if labs neg. He may do in [place]. Leaving soon.         There are no Patient Instructions on file for this visit.     Matthew Carr MD    Assessment/Plan

## 2022-01-21 DIAGNOSIS — E11.42 TYPE 2 DIABETES, CONTROLLED, WITH PERIPHERAL NEUROPATHY: ICD-10-CM

## 2022-01-22 RX ORDER — BLOOD SUGAR DIAGNOSTIC
STRIP MISCELLANEOUS
Qty: 100 EACH | Refills: 3 | Status: SHIPPED | OUTPATIENT
Start: 2022-01-22 | End: 2022-03-29

## 2022-03-29 DIAGNOSIS — E11.42 TYPE 2 DIABETES, CONTROLLED, WITH PERIPHERAL NEUROPATHY: ICD-10-CM

## 2022-03-29 RX ORDER — BLOOD SUGAR DIAGNOSTIC
STRIP MISCELLANEOUS
Qty: 300 EACH | Refills: 3 | Status: SHIPPED | OUTPATIENT
Start: 2022-03-29

## 2022-03-29 NOTE — TELEPHONE ENCOUNTER
Rx Refill Note  Requested Prescriptions     Pending Prescriptions Disp Refills   • Accu-Chek Alondra Plus test strip [Pharmacy Med Name: ACCU-CHEK ALONDRA PLUS   Strip] 300 each 3     Sig: TEST THREE TIMES DAILY  AS  INSTRUCTED      Last office visit with prescribing clinician: 11/8/2021      Next office visit with prescribing clinician: 5/23/2022            Ariela Dhaliwal LPN  03/29/22, 15:50 EDT

## 2022-04-05 DIAGNOSIS — N40.1 BENIGN NON-NODULAR PROSTATIC HYPERPLASIA WITH LOWER URINARY TRACT SYMPTOMS: ICD-10-CM

## 2022-04-05 RX ORDER — FINASTERIDE 5 MG/1
5 TABLET, FILM COATED ORAL DAILY
Qty: 90 TABLET | Refills: 3 | Status: SHIPPED | OUTPATIENT
Start: 2022-04-05 | End: 2022-08-23

## 2022-04-14 RX ORDER — SITAGLIPTIN 100 MG/1
TABLET, FILM COATED ORAL
Qty: 90 TABLET | Refills: 3 | Status: SHIPPED | OUTPATIENT
Start: 2022-04-14

## 2022-04-26 ENCOUNTER — TELEPHONE (OUTPATIENT)
Dept: UROLOGY | Facility: CLINIC | Age: 77
End: 2022-04-26

## 2022-04-26 DIAGNOSIS — C61 PROSTATE CANCER: Primary | ICD-10-CM

## 2022-04-26 NOTE — TELEPHONE ENCOUNTER
Caller: WAQAS GUERIN    Relationship: WIFE    Best call back number: [phone]    What orders are you requesting (i.e. lab or imaging): PSA ORDER    In what timeframe would the patient need to come in: NEEDS PSA ASAP FOR APPT ON 5/3/22    Where will you receive your lab/imaging services:  FACILITY OR LABS    Additional notes: PLEASE CALL WHEN ORDER IS DONE SO PT CAN GO GET DONE.

## 2022-05-03 ENCOUNTER — OFFICE VISIT (OUTPATIENT)
Dept: UROLOGY | Facility: CLINIC | Age: 77
End: 2022-05-03

## 2022-05-03 VITALS
DIASTOLIC BLOOD PRESSURE: 74 MMHG | WEIGHT: 209 LBS | OXYGEN SATURATION: 97 % | SYSTOLIC BLOOD PRESSURE: 138 MMHG | HEIGHT: 72 IN | TEMPERATURE: 97.3 F | BODY MASS INDEX: 28.31 KG/M2 | HEART RATE: 103 BPM

## 2022-05-03 DIAGNOSIS — Z85.46 HISTORY OF PROSTATE CANCER: Primary | ICD-10-CM

## 2022-05-03 DIAGNOSIS — R39.9 LOWER URINARY TRACT SYMPTOMS (LUTS): ICD-10-CM

## 2022-05-03 PROCEDURE — 99214 OFFICE O/P EST MOD 30 MIN: CPT | Performed by: PHYSICIAN ASSISTANT

## 2022-05-03 NOTE — PROGRESS NOTES
"Chief Complaint   Patient presents with   • Follow-up     Hx of prostate cancer        HPI  Mr. Berg is a 77 y.o. male with remote history of low-grade prostate cancer presenting for follow-up.    At this visit, has had increasingly bothersome urinary symptoms for the past several months.  He had a PSA obtained prior to Proscar as prescribed by Dr. Lundberg.  He denies any dysuria or hematuria in the past year.    IPSS Questionnaire (AUA-7):  Over the past month…    1)  Incomplete Emptying  How often have you had a sensation of not emptying your bladder?  3 - About half the time   2)  Frequency  How often have you had to urinate less than every two hours? 2 - Less than half the time   3)  Intermittency  How often have you found you stopped and started again several times when you urinated?  4 - More than half the time   4) Urgency  How often have you found it difficult to postpone urination?  2 - Less than half the time   5) Weak Stream  How often have you had a weak urinary stream?  2 - Less than half the time   6) Straining  How often have you had to push or strain to begin urination?  2 - Less than half the time   7) Nocturia  How many times did you typically get up at night to urinate?  3 - 3 times   Total Score:  18       Quality of life due to urinary symptoms:  If you were to spend the rest of your life with your urinary condition the way it is now, how would you feel about that? 3-Mixed   Urine Leakage (Incontinence) 0-No Leakage         Past Medical History:   Diagnosis Date   • Acute asthma exacerbation    • Acute sinusitis    • Acute URI    • Allergic rhinitis    • Anemia    • Arthritis    • Asthma    • Benign prostatic hypertrophy    • Bronchitis    • Chest pain     \"I'VE SEEN DR. CARRASCO FOR IT A LONG TIME AGO, AND THAT'S WHEN HE DID TESTING\".  STATES HAS NOT MENTIONED IT TO DR. KHAN.  STATES UNSURE OF WHEN LAST TIME WAS\".  PT STATES \"I'M NOT A COMPLAINER\".  STATES COLD WEATHER AND WALKING BRING IT " "ON.  GETS SHORTNESS OF BREATH OCCASSIONALLY.  MORE DESCRIBED AS PRESSURE.    • ED (erectile dysfunction)    • Elevated cholesterol    • Family history of deafness and hearing loss    • Full dentures     INSTRUCTED NO ADHESIVES THE DOS   • GERD (gastroesophageal reflux disease)    • H/O echocardiogram 2003   • Hiatal hernia    • History of fracture     REPORTS TOE ON RIGHT FOOT - NO SURGICAL INTERVENTION REQUIRED   • [place] (hard of hearing)     SPOUSE REPORTS NO USE OF HEARING AIDS   • Hx of exercise stress test     SPOUSE REPORTS LAST DONE BY DR ALMANZA AROUND JUNE 2018 AND REPORTED ALL WNL'S AND MD RELEASED PATIENT   • Hyperlipidemia    • Hypertension    • Jaundice     AS A TEEN    • Obstructive sleep apnea     NO CPAP - SPOUSE REPORTS \"HE WOULDN'T DO IT\"   • Peripheral neuropathy    • Pneumonia     SPOUSE REPORTS HISTORY   • Prostate cancer (HCC)    • Short of breath on exertion    • Skin cancer    • Type 2 diabetes mellitus (HCC)    • Urinary frequency    • Vitamin B 12 deficiency    • Vitamin D deficiency    • Wears glasses        Past Surgical History:   Procedure Laterality Date   • APPENDECTOMY     • CARDIAC CATHETERIZATION  2003?    \"IT WAS OK\"   • CHOLECYSTECTOMY  06/2018   • CHOLECYSTECTOMY WITH INTRAOPERATIVE CHOLANGIOGRAM N/A 5/15/2018    Procedure: CHOLECYSTECTOMY LAPAROSCOPIC INTRAOPERATIVE CHOLANGIOGRAM;  Surgeon: Eric Bran MD;  Location: [facility] OR;  Service: General   • COLONOSCOPY     • COLONOSCOPY N/A 8/8/2018    Procedure: COLONOSCOPY;  Surgeon: Eric Bran MD;  Location: [facility] ENDOSCOPY;  Service: Gastroenterology   • ENDOSCOPY     • HAND SURGERY Left    • KNEE ARTHROSCOPY Left    • MOUTH SURGERY      FULL MOUTH EXTRACTION   • SKIN CANCER EXCISION  07/22/2019    skin cancer on head         Current Outpatient Medications:   •  Accu-Chek Alondra Plus test strip, TEST THREE TIMES DAILY  AS  INSTRUCTED, Disp: 300 each, Rfl: 3  •  ACCU-CHEK SOFTCLIX LANCETS lancets, 1 each by Other route 3 " (Three) Times a Day. Use as instructed, Disp: 300 each, Rfl: 3  •  albuterol sulfate  (90 Base) MCG/ACT inhaler, Ventolin  (90 Base) MCG/ACT Inhalation Aerosol Solution; Patient Sig: Ventolin  (90 Base) MCG/ACT Inhalation Aerosol Solution INHALE 1 TO 2 PUFFS EVERY 4 TO 6 HOURS AS NEEDED.; 1; 11; 18-Aug-2015; Active, Disp: , Rfl:   •  aspirin 81 MG EC tablet, Take 81 mg by mouth Daily., Disp: , Rfl:   •  B Complex-C (B-complex with vitamin C) tablet, Take 1 tablet by mouth Daily., Disp: 30 tablet, Rfl: 11  •  bisacodyl (bisacodyl) 5 MG EC tablet, Take 1 tablet by mouth Daily., Disp: 4 tablet, Rfl: 0  •  Blood Gluc Meter Disp-Strips device, 1 strip 3 (Three) Times a Day. Patient needs Accu-chek meter and test strips., Disp: 1 each, Rfl: 0  •  Blood Glucose Calibration (SURECHEK CONTROL SOLUTION) NORMAL liquid, 1 bottle by In Vitro route every 30 (thirty) days., Disp: 3 each, Rfl: 3  •  Blood Glucose Monitoring Suppl (Accu-Chek Alondra Plus) w/Device kit, for testing sugar daily, Disp: 1 kit, Rfl: 0  •  Cholecalciferol (VITAMIN D) 2000 UNITS tablet, Take 1 capsule by mouth 3 (Three) Times a Week., Disp: , Rfl:   •  Chromium-Cinnamon 100-500 MCG-MG capsule, Take 2 capsules by mouth 2 (two) times a day., Disp: , Rfl:   •  finasteride (PROSCAR) 5 MG tablet, Take 1 tablet by mouth Daily., Disp: 90 tablet, Rfl: 3  •  glimepiride (AMARYL) 4 MG tablet, TAKE 1 TABLET EVERY DAY, Disp: 90 tablet, Rfl: 3  •  Januvia 100 MG tablet, TAKE 1 TABLET EVERY DAY, Disp: 90 tablet, Rfl: 3  •  losartan-hydrochlorothiazide (HYZAAR) 50-12.5 MG per tablet, TAKE 1 TABLET EVERY DAY, Disp: 90 tablet, Rfl: 3  •  metFORMIN (GLUCOPHAGE) 500 MG tablet, Take 1 tablet by mouth 2 (Two) Times a Day With Meals., Disp: 180 tablet, Rfl: 3  •  rOPINIRole (Requip) 0.25 MG tablet, Take 1 tablet by mouth Every Night. Take 1 hour before bedtime., Disp: 30 tablet, Rfl: 5  •  WELCHOL 625 MG tablet, TAKE 1 TABLET TWICE DAILY, Disp: 180 tablet,  "Rfl: 3     Physical Exam  Visit Vitals  /74   Pulse 103   Temp 97.3 °F (36.3 °C)   Ht 182.9 cm (72.01\")   Wt 94.8 kg (209 lb)   SpO2 97%   BMI 28.34 kg/m²       Labs  Brief Urine Lab Results  (Last result in the past 365 days)      Color   Clarity   Blood   Leuk Est   Nitrite   Protein   CREAT   Urine HCG        05/19/21 1058 Yellow   Clear   Negative   Negative   Negative   Negative                 Lab Results   Component Value Date    GLUCOSE 140 (H) 04/28/2022    CALCIUM 10.6 (H) 04/28/2022     04/28/2022    K 4.4 04/28/2022    CO2 23 04/28/2022     04/28/2022    BUN 25 04/28/2022    CREATININE 1.30 (H) 04/28/2022    EGFRIFAFRI 71 08/05/2021    EGFRIFNONA 59 (L) 08/05/2021    BCR 19 04/28/2022    ANIONGAP 5.0 08/16/2016       Lab Results   Component Value Date    WBC 5.8 04/28/2022    HGB 13.7 04/28/2022    HCT 41.5 04/28/2022    MCV 94 04/28/2022     04/28/2022            Lab Results   Component Value Date    PSA 1.160 04/28/2022    PSA 1.020 05/12/2021    PSA 1.080 06/23/2020       Assessment  77 y.o. male with remote history of low-grade prostate cancer undergoing active surveillance and treatment with finasteride for over 5 years presenting for follow-up and discussion of treatment options for increasing LUTS.  His medical history is reviewed and Dr. Lundberg documents history of benign RIAN and appropriately responsive PSA since at least 2015.  Unfortunately, there is no copy of his prostate biopsy diagnostic of prostate cancer.    That said, his PSA remains quite low with 1.1.  His LUTS have been gradually progressive and his IPSS is now 18.  We discussed the option for further medical management versus permanent surgical options.  He prefers to decline further medical management with the addition of new medicines, acknowledging all medications have potential side effects and adverse effects.  He would prefer to undergo further work-up to discuss his surgical options and limit " medicines.    Plan  1.  Follow-up for cystoscopy/TRUS/RIAN  2.  Obtain uroflow, PVR, UA at next visit  3.  Continue Proscar

## 2022-05-06 ENCOUNTER — OFFICE VISIT (OUTPATIENT)
Dept: INTERNAL MEDICINE | Facility: CLINIC | Age: 77
End: 2022-05-06

## 2022-05-06 VITALS
WEIGHT: 207 LBS | OXYGEN SATURATION: 99 % | RESPIRATION RATE: 16 BRPM | HEIGHT: 72 IN | HEART RATE: 65 BPM | DIASTOLIC BLOOD PRESSURE: 82 MMHG | BODY MASS INDEX: 28.04 KG/M2 | TEMPERATURE: 98.2 F | SYSTOLIC BLOOD PRESSURE: 142 MMHG

## 2022-05-06 DIAGNOSIS — G95.0 SYRINGOMYELIA: ICD-10-CM

## 2022-05-06 DIAGNOSIS — E11.42 TYPE 2 DIABETES, CONTROLLED, WITH PERIPHERAL NEUROPATHY: ICD-10-CM

## 2022-05-06 DIAGNOSIS — R53.1 WEAKNESS: Primary | ICD-10-CM

## 2022-05-06 LAB
A PHAGOCYTOPH IGG TITR SER IF: NEGATIVE {TITER}
A PHAGOCYTOPH IGM TITR SER IF: NEGATIVE {TITER}
ALBUMIN SERPL ELPH-MCNC: 3.8 G/DL (ref 2.9–4.4)
ALBUMIN SERPL-MCNC: 4.4 G/DL (ref 3.7–4.7)
ALBUMIN/GLOB SERPL: 1.3 {RATIO} (ref 0.7–1.7)
ALBUMIN/GLOB SERPL: 1.9 {RATIO} (ref 1.2–2.2)
ALP SERPL-CCNC: 66 IU/L (ref 44–121)
ALPHA1 GLOB SERPL ELPH-MCNC: 0.2 G/DL (ref 0–0.4)
ALPHA2 GLOB SERPL ELPH-MCNC: 0.9 G/DL (ref 0.4–1)
ALT SERPL-CCNC: 13 IU/L (ref 0–44)
ASO AB SERPL-ACNC: 26.4 IU/ML (ref 0–200)
AST SERPL-CCNC: 16 IU/L (ref 0–40)
B BURGDOR DNA SPEC QL NAA+PROBE: NEGATIVE
B-GLOBULIN SERPL ELPH-MCNC: 1 G/DL (ref 0.7–1.3)
BASOPHILS # BLD AUTO: 0 X10E3/UL (ref 0–0.2)
BASOPHILS NFR BLD AUTO: 1 %
BILIRUB SERPL-MCNC: 0.6 MG/DL (ref 0–1.2)
BUN SERPL-MCNC: 25 MG/DL (ref 8–27)
BUN/CREAT SERPL: 19 (ref 10–24)
CALCIUM SERPL-MCNC: 10.6 MG/DL (ref 8.6–10.2)
CCP IGA+IGG SERPL IA-ACNC: 3 UNITS (ref 0–19)
CHLORIDE SERPL-SCNC: 102 MMOL/L (ref 96–106)
CK SERPL-CCNC: 158 U/L (ref 41–331)
CO2 SERPL-SCNC: 23 MMOL/L (ref 20–29)
CREAT SERPL-MCNC: 1.3 MG/DL (ref 0.76–1.27)
CRP SERPL-MCNC: 1 MG/L (ref 0–10)
E CHAFFEENSIS IGG TITR SER IF: NEGATIVE {TITER}
E CHAFFEENSIS IGM TITR SER IF: NEGATIVE {TITER}
EGFRCR SERPLBLD CKD-EPI 2021: 57 ML/MIN/1.73
EOSINOPHIL # BLD AUTO: 0.2 X10E3/UL (ref 0–0.4)
EOSINOPHIL NFR BLD AUTO: 3 %
ERYTHROCYTE [DISTWIDTH] IN BLOOD BY AUTOMATED COUNT: 12.4 % (ref 11.6–15.4)
ERYTHROCYTE [SEDIMENTATION RATE] IN BLOOD BY WESTERGREN METHOD: 3 MM/HR (ref 0–30)
GAMMA GLOB SERPL ELPH-MCNC: 0.9 G/DL (ref 0.4–1.8)
GLOBULIN SER CALC-MCNC: 2.3 G/DL (ref 1.5–4.5)
GLOBULIN SER CALC-MCNC: 2.9 G/DL (ref 2.2–3.9)
GLUCOSE SERPL-MCNC: 140 MG/DL (ref 65–99)
HBA1C MFR BLD: 7.4 % (ref 4.8–5.6)
HCT VFR BLD AUTO: 41.5 % (ref 37.5–51)
HGB BLD-MCNC: 13.7 G/DL (ref 13–17.7)
IMM GRANULOCYTES # BLD AUTO: 0 X10E3/UL (ref 0–0.1)
IMM GRANULOCYTES NFR BLD AUTO: 0 %
LABORATORY COMMENT REPORT: NORMAL
LYMPHOCYTES # BLD AUTO: 2.5 X10E3/UL (ref 0.7–3.1)
LYMPHOCYTES NFR BLD AUTO: 42 %
M PROTEIN SERPL ELPH-MCNC: NORMAL G/DL
MCH RBC QN AUTO: 31.1 PG (ref 26.6–33)
MCHC RBC AUTO-ENTMCNC: 33 G/DL (ref 31.5–35.7)
MCV RBC AUTO: 94 FL (ref 79–97)
MONOCYTES # BLD AUTO: 0.6 X10E3/UL (ref 0.1–0.9)
MONOCYTES NFR BLD AUTO: 10 %
MYOGLOBIN SERPL-MCNC: 115 NG/ML (ref 28–72)
NEUTROPHILS # BLD AUTO: 2.6 X10E3/UL (ref 1.4–7)
NEUTROPHILS NFR BLD AUTO: 44 %
PLATELET # BLD AUTO: 217 X10E3/UL (ref 150–450)
POTASSIUM SERPL-SCNC: 4.4 MMOL/L (ref 3.5–5.2)
PROT PATTERN SERPL ELPH-IMP: NORMAL
PROT SERPL-MCNC: 6.7 G/DL (ref 6–8.5)
R RICKETTSI IGG SER QL IA: NEGATIVE
R RICKETTSI IGM SER-ACNC: 0.36 INDEX (ref 0–0.89)
RBC # BLD AUTO: 4.4 X10E6/UL (ref 4.14–5.8)
RHEUMATOID FACT SERPL-ACNC: <10 IU/ML
SODIUM SERPL-SCNC: 141 MMOL/L (ref 134–144)
T4 FREE SERPL-MCNC: 1.11 NG/DL (ref 0.82–1.77)
TSH SERPL DL<=0.005 MIU/L-ACNC: 1.63 UIU/ML (ref 0.45–4.5)
VIT B12 SERPL-MCNC: 425 PG/ML (ref 232–1245)
VIT B6 SERPL-MCNC: 7.2 UG/L (ref 3.4–65.2)
WBC # BLD AUTO: 5.8 X10E3/UL (ref 3.4–10.8)

## 2022-05-06 PROCEDURE — 99214 OFFICE O/P EST MOD 30 MIN: CPT | Performed by: INTERNAL MEDICINE

## 2022-05-06 RX ORDER — LOSARTAN POTASSIUM AND HYDROCHLOROTHIAZIDE 12.5; 5 MG/1; MG/1
1 TABLET ORAL DAILY
Qty: 90 TABLET | Refills: 3 | Status: SHIPPED | OUTPATIENT
Start: 2022-05-06

## 2022-05-06 RX ORDER — GLIMEPIRIDE 4 MG/1
4 TABLET ORAL DAILY
Qty: 90 TABLET | Refills: 3 | Status: SHIPPED | OUTPATIENT
Start: 2022-05-06

## 2022-05-06 NOTE — PROGRESS NOTES
Subjective     Patient ID: Fazal Berg III is a 77 y.o. male. Patient is here for management of multiple medical problems.     Chief Complaint   Patient presents with   • Diabetes     History of Present Illness     Pt legs and feet hurt.      Loosing uirne    Numb from feet to penis.   No lower back pain.  Hard to lift legs up.   Leg are tightness         The following portions of the patient's history were reviewed and updated as appropriate: allergies, current medications, past family history, past medical history, past social history, past surgical history and problem list.    Review of Systems    Current Outpatient Medications:   •  Accu-Chek Alondra Plus test strip, TEST THREE TIMES DAILY  AS  INSTRUCTED, Disp: 300 each, Rfl: 3  •  ACCU-CHEK SOFTCLIX LANCETS lancets, 1 each by Other route 3 (Three) Times a Day. Use as instructed, Disp: 300 each, Rfl: 3  •  albuterol sulfate  (90 Base) MCG/ACT inhaler, Ventolin  (90 Base) MCG/ACT Inhalation Aerosol Solution; Patient Sig: Ventolin  (90 Base) MCG/ACT Inhalation Aerosol Solution INHALE 1 TO 2 PUFFS EVERY 4 TO 6 HOURS AS NEEDED.; 1; 11; 18-Aug-2015; Active, Disp: , Rfl:   •  aspirin 81 MG EC tablet, Take 81 mg by mouth Daily., Disp: , Rfl:   •  B Complex-C (B-complex with vitamin C) tablet, Take 1 tablet by mouth Daily., Disp: 30 tablet, Rfl: 11  •  bisacodyl (bisacodyl) 5 MG EC tablet, Take 1 tablet by mouth Daily., Disp: 4 tablet, Rfl: 0  •  Blood Gluc Meter Disp-Strips device, 1 strip 3 (Three) Times a Day. Patient needs Accu-chek meter and test strips., Disp: 1 each, Rfl: 0  •  Blood Glucose Calibration (SURECHEK CONTROL SOLUTION) NORMAL liquid, 1 bottle by In Vitro route every 30 (thirty) days., Disp: 3 each, Rfl: 3  •  Blood Glucose Monitoring Suppl (Accu-Chek Alondra Plus) w/Device kit, for testing sugar daily, Disp: 1 kit, Rfl: 0  •  Cholecalciferol (VITAMIN D) 2000 UNITS tablet, Take 1 capsule by mouth 3 (Three) Times a Week., Disp: ,  "Rfl:   •  Chromium-Cinnamon 100-500 MCG-MG capsule, Take 2 capsules by mouth 2 (two) times a day., Disp: , Rfl:   •  finasteride (PROSCAR) 5 MG tablet, Take 1 tablet by mouth Daily., Disp: 90 tablet, Rfl: 3  •  glimepiride (AMARYL) 4 MG tablet, Take 1 tablet by mouth Daily., Disp: 90 tablet, Rfl: 3  •  Januvia 100 MG tablet, TAKE 1 TABLET EVERY DAY, Disp: 90 tablet, Rfl: 3  •  losartan-hydrochlorothiazide (HYZAAR) 50-12.5 MG per tablet, Take 1 tablet by mouth Daily., Disp: 90 tablet, Rfl: 3  •  metFORMIN (GLUCOPHAGE) 500 MG tablet, Take 1 tablet by mouth 2 (Two) Times a Day With Meals., Disp: 180 tablet, Rfl: 3    Objective      Blood pressure 142/82, pulse 65, temperature 98.2 °F (36.8 °C), resp. rate 16, height 182.9 cm (72.01\"), weight 93.9 kg (207 lb), SpO2 99 %.    Physical Exam     General Appearance:    Alert, cooperative, no distress, appears stated age   Head:    Normocephalic, without obvious abnormality, atraumatic   Eyes:    PERRL, conjunctiva/corneas clear, EOM's intact   Ears:    Normal TM's and external ear canals, both ears   Nose:   Nares normal, septum midline, mucosa normal, no drainage   or sinus tenderness   Throat:   Lips, mucosa, and tongue normal; teeth and gums normal   Neck:   Supple, symmetrical, trachea midline, no adenopathy;        thyroid:  No enlargement/tenderness/nodules; no carotid    bruit or JVD   Back:     Symmetric, no curvature, ROM normal, no CVA tenderness   Lungs:     Clear to auscultation bilaterally, respirations unlabored   Chest wall:    No tenderness or deformity   Heart:    Regular rate and rhythm, S1 and S2 normal, no murmur,        rub or gallop   Abdomen:     Soft, non-tender, bowel sounds active all four quadrants,     no masses, no organomegaly   Extremities:   Extremities normal, atraumatic, no cyanosis or edema   Pulses:   2+ and symmetric all extremities   Skin:   Skin color, texture, turgor normal, no rashes or lesions   Lymph nodes:   Cervical, " supraclavicular, and axillary nodes normal   Neurologic: Loss of dtr's legs. symmetrical numbness in legs latteral toes better sensation the medial toes. Numbs imoproved more proximal. No deficits in hands.         Results for orders placed or performed in visit on 04/26/22   PSA Diagnostic    Specimen: Blood   Result Value Ref Range    PSA 1.160 0.000 - 4.000 ng/mL         Assessment/Plan   Neuropathy form waist down. Not affecting hands.  Concern for syringylia.              Mild elevation in Myoglobin.  Ck normal.            Diagnoses and all orders for this visit:    1. Weakness (Primary)  -     MRI Thoracic Spine Without Contrast  -     MRI Cervical Spine Without Contrast  -     MRI Lumbar Spine Without Contrast; Future  -     Ambulatory Referral to Neurology    2. Type 2 diabetes, controlled, with peripheral neuropathy (HCC)  -     metFORMIN (GLUCOPHAGE) 500 MG tablet; Take 1 tablet by mouth 2 (Two) Times a Day With Meals.  Dispense: 180 tablet; Refill: 3    3. Syringomyelia (HCC)  -     MRI Thoracic Spine Without Contrast  -     MRI Cervical Spine Without Contrast  -     MRI Lumbar Spine Without Contrast; Future  -     Ambulatory Referral to Neurology    Other orders  -     losartan-hydrochlorothiazide (HYZAAR) 50-12.5 MG per tablet; Take 1 tablet by mouth Daily.  Dispense: 90 tablet; Refill: 3  -     glimepiride (AMARYL) 4 MG tablet; Take 1 tablet by mouth Daily.  Dispense: 90 tablet; Refill: 3      Return in about 4 weeks (around 6/3/2022).          There are no Patient Instructions on file for this visit.     Matthew Carr MD    Assessment/Plan

## 2022-06-13 ENCOUNTER — HOSPITAL ENCOUNTER (OUTPATIENT)
Dept: MRI IMAGING | Facility: HOSPITAL | Age: 77
End: 2022-06-13

## 2022-06-14 ENCOUNTER — APPOINTMENT (OUTPATIENT)
Dept: MRI IMAGING | Facility: HOSPITAL | Age: 77
End: 2022-06-14

## 2022-06-16 ENCOUNTER — HOSPITAL ENCOUNTER (OUTPATIENT)
Dept: MRI IMAGING | Facility: HOSPITAL | Age: 77
Discharge: HOME | End: 2022-06-16

## 2022-06-16 DIAGNOSIS — G95.0 SYRINGOMYELIA: ICD-10-CM

## 2022-06-16 DIAGNOSIS — R53.1 WEAKNESS: ICD-10-CM

## 2022-06-16 PROCEDURE — 72141 MRI NECK SPINE W/O DYE: CPT

## 2022-06-16 PROCEDURE — 72146 MRI CHEST SPINE W/O DYE: CPT

## 2022-06-16 PROCEDURE — 72148 MRI LUMBAR SPINE W/O DYE: CPT

## 2022-06-20 ENCOUNTER — OFFICE VISIT (OUTPATIENT)
Dept: INTERNAL MEDICINE | Facility: CLINIC | Age: 77
End: 2022-06-20

## 2022-06-20 ENCOUNTER — TELEPHONE (OUTPATIENT)
Dept: INTERNAL MEDICINE | Facility: CLINIC | Age: 77
End: 2022-06-20

## 2022-06-20 VITALS
WEIGHT: 207 LBS | TEMPERATURE: 97.8 F | SYSTOLIC BLOOD PRESSURE: 138 MMHG | BODY MASS INDEX: 28.04 KG/M2 | OXYGEN SATURATION: 99 % | HEART RATE: 59 BPM | RESPIRATION RATE: 16 BRPM | DIASTOLIC BLOOD PRESSURE: 56 MMHG | HEIGHT: 72 IN

## 2022-06-20 DIAGNOSIS — G89.29 CHRONIC BILATERAL LOW BACK PAIN WITH BILATERAL SCIATICA: ICD-10-CM

## 2022-06-20 DIAGNOSIS — M54.42 CHRONIC BILATERAL LOW BACK PAIN WITH BILATERAL SCIATICA: ICD-10-CM

## 2022-06-20 DIAGNOSIS — R93.7 ABNORMAL MRI, CERVICAL SPINE: Primary | ICD-10-CM

## 2022-06-20 DIAGNOSIS — M54.41 CHRONIC BILATERAL LOW BACK PAIN WITH BILATERAL SCIATICA: ICD-10-CM

## 2022-06-20 DIAGNOSIS — M54.40 LOW BACK PAIN WITH SCIATICA, SCIATICA LATERALITY UNSPECIFIED, UNSPECIFIED BACK PAIN LATERALITY, UNSPECIFIED CHRONICITY: Primary | ICD-10-CM

## 2022-06-20 DIAGNOSIS — R93.7 ABNORMAL MRI, SPINE: Primary | ICD-10-CM

## 2022-06-20 PROCEDURE — 99213 OFFICE O/P EST LOW 20 MIN: CPT | Performed by: NURSE PRACTITIONER

## 2022-06-20 RX ORDER — ALBUTEROL SULFATE 90 UG/1
2 AEROSOL, METERED RESPIRATORY (INHALATION) EVERY 4 HOURS PRN
Qty: 8 G | Refills: 2 | Status: SHIPPED | OUTPATIENT
Start: 2022-06-20 | End: 2023-01-16 | Stop reason: SDUPTHER

## 2022-06-20 NOTE — TELEPHONE ENCOUNTER
Caller: Krystyna Berg    Relationship: Emergency Contact    Best call back number: [phone]     What was the call regarding:   PATIENT'S (WIFE) KRYSTYNA WOULD LIKE A CALL BACK REGARDING PATIENT HAVE A APPOINTMENT SCHEDULED WITH NEUROSURGERY  TAN VILLAFAAN MD   08/02/2022 AND PATIENT WAS INFORMED THAT TODAY MONIQUE KHAN MD SCHEDULED PATIENT A APPOINTMENT [facility] 09/13/2022    PATIENT'S (WIFE) KRYSTYNA WOULD LIKE A CALL BACK REGARDING THIS INFORMATION AND BE INFORMED IF PATIENT NEEDS BOTH APPOINTMENTS     Do you require a callback: YES

## 2022-06-20 NOTE — PROGRESS NOTES
"Chief Complaint   Patient presents with   • Follow-up     4 week f/u weakness, sx not improved, increased asthma sx     Subjective   Fazal Berg III is a 77 y.o. male.     History of Present Illness     Patient presents for follow up. MRI of cervical, thoracic and lumbar spine complete. Discussed results with patient and wife. PCP already ordered a referral to neurosurgery, and this would be the next step I would proceed with to further evaluate lower limb numbness. He is able to ambulate without difficulty, but has weakness, numbness/tingling in bilateral lower extremities. Reports hx of back surgery in the past. Symptoms are the same, not worse or better. He denies having any back pain today.     The following portions of the patient's history were reviewed and updated as appropriate: allergies, current medications, past family history, past medical history, past social history, past surgical history and problem list.    Review of Systems   Musculoskeletal: Positive for back pain. Negative for gait problem.   Neurological: Positive for numbness (tingling, bilateral thighs).   All other systems reviewed and are negative.      Objective   /56   Pulse 59   Temp 97.8 °F (36.6 °C) (Temporal)   Resp 16   Ht 182.9 cm (72.01\")   Wt 93.9 kg (207 lb)   SpO2 99%   BMI 28.07 kg/m²   Body mass index is 28.07 kg/m².  Physical Exam  Vitals and nursing note reviewed.   Constitutional:       Appearance: Normal appearance.   HENT:      Head: Normocephalic and atraumatic.   Cardiovascular:      Rate and Rhythm: Normal rate and regular rhythm.   Pulmonary:      Effort: Pulmonary effort is normal.      Breath sounds: Normal breath sounds.   Musculoskeletal:         General: No tenderness or deformity.      Lumbar back: No tenderness. Decreased range of motion.   Neurological:      Mental Status: He is alert and oriented to person, place, and time.   Psychiatric:         Mood and Affect: Mood normal.         " Behavior: Behavior normal.         Thought Content: Thought content normal.         Judgment: Judgment normal.         Assessment & Plan   Fazal SIMS Morena [facility] is here today and the following problems have been addressed:      Diagnoses and all orders for this visit:    1. Abnormal MRI, spine (Primary)  -     Referral to neurosurgery placed by PCP today.  Will call with appointment.     2. Chronic bilateral low back pain with bilateral sciatica  -     Referral to neurosurgery placed by PCP today.  Will call with appointment.    Other orders  -     albuterol sulfate  (90 Base) MCG/ACT inhaler; Inhale 2 puffs Every 4 (Four) Hours As Needed for Wheezing or Shortness of Air.  Dispense: 8 g; Refill: 2.  Patient in today for refill of albuterol inhaler.        Follow Up   Return for Annual Dr Carr .  Patient was given instructions and counseling regarding his condition or for health maintenance advice. Please see specific information pulled into the AVS if appropriate.     Jeannette CORRALES  [facility] [place]

## 2022-07-06 ENCOUNTER — OFFICE VISIT (OUTPATIENT)
Dept: INTERNAL MEDICINE | Facility: CLINIC | Age: 77
End: 2022-07-06

## 2022-07-06 VITALS
SYSTOLIC BLOOD PRESSURE: 149 MMHG | HEART RATE: 61 BPM | WEIGHT: 203 LBS | RESPIRATION RATE: 16 BRPM | DIASTOLIC BLOOD PRESSURE: 76 MMHG | HEIGHT: 72 IN | BODY MASS INDEX: 27.5 KG/M2 | OXYGEN SATURATION: 96 % | TEMPERATURE: 97.3 F

## 2022-07-06 DIAGNOSIS — J01.01 ACUTE RECURRENT MAXILLARY SINUSITIS: ICD-10-CM

## 2022-07-06 DIAGNOSIS — E11.65 TYPE 2 DIABETES MELLITUS WITH HYPERGLYCEMIA, WITHOUT LONG-TERM CURRENT USE OF INSULIN: ICD-10-CM

## 2022-07-06 DIAGNOSIS — H66.003 ACUTE SUPPURATIVE OTITIS MEDIA OF BOTH EARS WITHOUT SPONTANEOUS RUPTURE OF TYMPANIC MEMBRANES, RECURRENCE NOT SPECIFIED: ICD-10-CM

## 2022-07-06 DIAGNOSIS — I10 BENIGN ESSENTIAL HYPERTENSION: Primary | ICD-10-CM

## 2022-07-06 PROCEDURE — 99214 OFFICE O/P EST MOD 30 MIN: CPT | Performed by: INTERNAL MEDICINE

## 2022-07-06 RX ORDER — AMOXICILLIN 500 MG/1
500 CAPSULE ORAL 3 TIMES DAILY
Qty: 30 CAPSULE | Refills: 0 | Status: SHIPPED | OUTPATIENT
Start: 2022-07-06 | End: 2022-07-16

## 2022-07-06 NOTE — PROGRESS NOTES
"Chief Complaint  Earache (Left ), Hypertension, Nasal Congestion, and Sinus Problem    Subjective        Fazal Berg III presents to [facility]  History of Present Illness  HPI: Patient is here to follow up on the blood pressure and sugar and is taking his medications as prescribed and has had no side effects. The patient is also here to follow up on sugar  and is trying to follow a diet.   The patient also complains of ear pain , nasal discharge   And sinus congestion since the past few days, patient has been trying over the counter medications with not much relief in the symptoms ,      Objective   Vital Signs:  /76   Pulse 61   Temp 97.3 °F (36.3 °C)   Resp 16   Ht 182.9 cm (72.01\")   Wt 92.1 kg (203 lb)   SpO2 96%   BMI 27.53 kg/m²   Estimated body mass index is 27.53 kg/m² as calculated from the following:    Height as of this encounter: 182.9 cm (72.01\").    Weight as of this encounter: 92.1 kg (203 lb).       Vitals:    07/06/22 0850 07/06/22 0928   BP: 176/66 149/76   Pulse: 64 61   Resp: 16    Temp: 97.3 °F (36.3 °C)    SpO2: 96%    Weight: 92.1 kg (203 lb)    Height: 182.9 cm (72.01\")          Physical Exam  Vitals and nursing note reviewed.   Constitutional:       General: He is not in acute distress.     Appearance: Normal appearance. He is not diaphoretic.   HENT:      Head: Normocephalic and atraumatic.      Right Ear: External ear normal.      Left Ear: External ear normal.      Nose: Nose normal.   Eyes:      Extraocular Movements: Extraocular movements intact.      Conjunctiva/sclera: Conjunctivae normal.   Neck:      Trachea: Trachea normal.   Cardiovascular:      Rate and Rhythm: Normal rate and regular rhythm.      Heart sounds: Normal heart sounds.   Pulmonary:      Effort: Pulmonary effort is normal. No respiratory distress.   Abdominal:      General: Abdomen is flat.   Musculoskeletal:      Cervical back: Neck supple.      Comments: Moves all " limbs   Skin:     General: Skin is warm and dry.      Findings: No erythema.   Neurological:      Mental Status: He is alert and oriented to person, place, and time.      Comments: No gross motor or sensory deficits        Result Review :                Assessment and Plan   Diagnoses and all orders for this visit:    1. Benign essential hypertension (Primary)    2. Acute suppurative otitis media of both ears without spontaneous rupture of tympanic membranes, recurrence not specified  -     amoxicillin (AMOXIL) 500 MG capsule; Take 1 capsule by mouth 3 (Three) Times a Day for 10 days.  Dispense: 30 capsule; Refill: 0    3. Acute recurrent maxillary sinusitis  -     amoxicillin (AMOXIL) 500 MG capsule; Take 1 capsule by mouth 3 (Three) Times a Day for 10 days.  Dispense: 30 capsule; Refill: 0    4. Type 2 diabetes mellitus with hyperglycemia, without long-term current use of insulin ([facility])     Plan :   1.Benign Essential Hypertension: will  continue current medications, low sodium diet, patient to continue to monitor  blood pressure  2  Acute suppurative otitis media: will   start oral antibiotics    3 .Acute maxillary sinusitis:   will   start oral antibiotics    4 . Diabetes  Mellitus  :  Reviewed   fasting CMP  and hba1c  , diet and exercise counseled , Will continue current medications              I spent 30 minutes caring for Fazal on this date of service. This time includes time spent by me in the following activities:preparing for the visit, reviewing tests, performing a medically appropriate examination and/or evaluation , counseling and educating the patient/family/caregiver, ordering medications, tests, or procedures and documenting information in the medical record  Follow Up    Patient was given instructions and counseling regarding his condition or for health maintenance advice. Please see specific information pulled into the AVS if appropriate.

## 2022-07-07 ENCOUNTER — PROCEDURE VISIT (OUTPATIENT)
Dept: UROLOGY | Facility: CLINIC | Age: 77
End: 2022-07-07

## 2022-07-07 DIAGNOSIS — C61 PROSTATE CANCER: Primary | ICD-10-CM

## 2022-07-07 DIAGNOSIS — R39.9 LOWER URINARY TRACT SYMPTOMS (LUTS): ICD-10-CM

## 2022-07-07 LAB
BILIRUB BLD-MCNC: NEGATIVE MG/DL
CLARITY, POC: CLEAR
COLOR UR: YELLOW
EXPIRATION DATE: ABNORMAL
GLUCOSE UR STRIP-MCNC: ABNORMAL MG/DL
KETONES UR QL: NEGATIVE
LEUKOCYTE EST, POC: NEGATIVE
Lab: ABNORMAL
NITRITE UR-MCNC: NEGATIVE MG/ML
PH UR: 5.5 [PH] (ref 5–8)
PROT UR STRIP-MCNC: NEGATIVE MG/DL
RBC # UR STRIP: ABNORMAL /UL
SP GR UR: 1.01 (ref 1–1.03)
UROBILINOGEN UR QL: NORMAL

## 2022-07-07 PROCEDURE — 99214 OFFICE O/P EST MOD 30 MIN: CPT | Performed by: UROLOGY

## 2022-07-07 PROCEDURE — 76872 US TRANSRECTAL: CPT | Performed by: UROLOGY

## 2022-07-07 PROCEDURE — 51741 ELECTRO-UROFLOWMETRY FIRST: CPT | Performed by: UROLOGY

## 2022-07-07 PROCEDURE — 52000 CYSTOURETHROSCOPY: CPT | Performed by: UROLOGY

## 2022-07-07 PROCEDURE — 81003 URINALYSIS AUTO W/O SCOPE: CPT | Performed by: UROLOGY

## 2022-07-07 RX ORDER — ACETAMINOPHEN 325 MG/1
650 TABLET ORAL EVERY 6 HOURS
Qty: 32 TABLET | Refills: 0 | Status: SHIPPED | OUTPATIENT
Start: 2022-07-07 | End: 2022-07-11

## 2022-07-07 RX ORDER — SULFAMETHOXAZOLE AND TRIMETHOPRIM 800; 160 MG/1; MG/1
1 TABLET ORAL 2 TIMES DAILY
Qty: 6 TABLET | Refills: 0 | Status: SHIPPED | OUTPATIENT
Start: 2022-07-07 | End: 2022-08-29

## 2022-07-07 RX ORDER — PHENAZOPYRIDINE HYDROCHLORIDE 100 MG/1
100 TABLET, FILM COATED ORAL 3 TIMES DAILY PRN
Qty: 30 TABLET | Refills: 0 | Status: SHIPPED | OUTPATIENT
Start: 2022-07-07 | End: 2022-07-10

## 2022-07-07 NOTE — PROGRESS NOTES
"CC  LUTS / BPH Workup    HPI  Ms. Berg is a 77 y.o. male with history below in assessment, who presents for follow up.     At this visit patient is here for BPH workup and discussion.     Past Medical History:   Diagnosis Date   • Acute asthma exacerbation    • Acute sinusitis    • Acute URI    • Allergic rhinitis    • Anemia    • Arthritis    • Asthma    • Benign prostatic hypertrophy    • Bronchitis    • Chest pain     \"I'VE SEEN DR. CARRASCO FOR IT A LONG TIME AGO, AND THAT'S WHEN HE DID TESTING\".  STATES HAS NOT MENTIONED IT TO DR. KHAN.  STATES UNSURE OF WHEN LAST TIME WAS\".  PT STATES \"I'M NOT A COMPLAINER\".  STATES COLD WEATHER AND WALKING BRING IT ON.  GETS SHORTNESS OF BREATH OCCASSIONALLY.  MORE DESCRIBED AS PRESSURE.    • ED (erectile dysfunction)    • Elevated cholesterol    • Family history of deafness and hearing loss    • Full dentures     INSTRUCTED NO ADHESIVES THE DOS   • GERD (gastroesophageal reflux disease)    • H/O echocardiogram 2003   • Hiatal hernia    • History of fracture     REPORTS TOE ON RIGHT FOOT - NO SURGICAL INTERVENTION REQUIRED   • [place] (hard of hearing)     SPOUSE REPORTS NO USE OF HEARING AIDS   • Hx of exercise stress test     SPOUSE REPORTS LAST DONE BY DR ALMANZA AROUND JUNE 2018 AND REPORTED ALL WNL'S AND MD RELEASED PATIENT   • Hyperlipidemia    • Hypertension    • Jaundice     AS A TEEN    • Obstructive sleep apnea     NO CPAP - SPOUSE REPORTS \"HE WOULDN'T DO IT\"   • Peripheral neuropathy    • Pneumonia     SPOUSE REPORTS HISTORY   • Prostate cancer (HCC)    • Short of breath on exertion    • Skin cancer    • Type 2 diabetes mellitus (HCC)    • Urinary frequency    • Vitamin B 12 deficiency    • Vitamin D deficiency    • Wears glasses        Past Surgical History:   Procedure Laterality Date   • APPENDECTOMY     • CARDIAC CATHETERIZATION  2003?    \"IT WAS OK\"   • CHOLECYSTECTOMY  06/2018   • CHOLECYSTECTOMY WITH INTRAOPERATIVE CHOLANGIOGRAM N/A 5/15/2018    Procedure: " CHOLECYSTECTOMY LAPAROSCOPIC INTRAOPERATIVE CHOLANGIOGRAM;  Surgeon: Eric Bran MD;  Location: [facility] OR;  Service: General   • COLONOSCOPY     • COLONOSCOPY N/A 8/8/2018    Procedure: COLONOSCOPY;  Surgeon: Eric Bran MD;  Location: [facility] ENDOSCOPY;  Service: Gastroenterology   • ENDOSCOPY     • HAND SURGERY Left    • KNEE ARTHROSCOPY Left    • MOUTH SURGERY      FULL MOUTH EXTRACTION   • SKIN CANCER EXCISION  07/22/2019    skin cancer on head         Current Outpatient Medications:   •  Accu-Chek Alondra Plus test strip, TEST THREE TIMES DAILY  AS  INSTRUCTED, Disp: 300 each, Rfl: 3  •  ACCU-CHEK SOFTCLIX LANCETS lancets, 1 each by Other route 3 (Three) Times a Day. Use as instructed, Disp: 300 each, Rfl: 3  •  albuterol sulfate  (90 Base) MCG/ACT inhaler, Inhale 2 puffs Every 4 (Four) Hours As Needed for Wheezing or Shortness of Air., Disp: 8 g, Rfl: 2  •  amoxicillin (AMOXIL) 500 MG capsule, Take 1 capsule by mouth 3 (Three) Times a Day for 10 days., Disp: 30 capsule, Rfl: 0  •  aspirin 81 MG EC tablet, Take 81 mg by mouth Daily., Disp: , Rfl:   •  bisacodyl (bisacodyl) 5 MG EC tablet, Take 1 tablet by mouth Daily., Disp: 4 tablet, Rfl: 0  •  Blood Gluc Meter Disp-Strips device, 1 strip 3 (Three) Times a Day. Patient needs Accu-chek meter and test strips., Disp: 1 each, Rfl: 0  •  Blood Glucose Calibration (SURECHEK CONTROL SOLUTION) NORMAL liquid, 1 bottle by In Vitro route every 30 (thirty) days., Disp: 3 each, Rfl: 3  •  Blood Glucose Monitoring Suppl (Accu-Chek Alondra Plus) w/Device kit, for testing sugar daily, Disp: 1 kit, Rfl: 0  •  Cholecalciferol (VITAMIN D) 2000 UNITS tablet, Take 1 capsule by mouth 3 (Three) Times a Week., Disp: , Rfl:   •  Chromium-Cinnamon 100-500 MCG-MG capsule, Take 2 capsules by mouth 2 (two) times a day., Disp: , Rfl:   •  finasteride (PROSCAR) 5 MG tablet, Take 1 tablet by mouth Daily., Disp: 90 tablet, Rfl: 3  •  glimepiride (AMARYL) 4 MG tablet, Take 1  "tablet by mouth Daily., Disp: 90 tablet, Rfl: 3  •  Januvia 100 MG tablet, TAKE 1 TABLET EVERY DAY, Disp: 90 tablet, Rfl: 3  •  losartan-hydrochlorothiazide (HYZAAR) 50-12.5 MG per tablet, Take 1 tablet by mouth Daily., Disp: 90 tablet, Rfl: 3  •  metFORMIN (GLUCOPHAGE) 500 MG tablet, Take 1 tablet by mouth 2 (Two) Times a Day With Meals. (Patient taking differently: Take 500 mg by mouth 2 (Two) Times a Day With Meals. \"Take it once\"), Disp: 180 tablet, Rfl: 3     Physical Exam  There were no vitals taken for this visit.    Labs  Brief Urine Lab Results     None          Lab Results   Component Value Date    GLUCOSE 140 (H) 04/28/2022    CALCIUM 10.6 (H) 04/28/2022     04/28/2022    K 4.4 04/28/2022    CO2 23 04/28/2022     04/28/2022    BUN 25 04/28/2022    CREATININE 1.30 (H) 04/28/2022    EGFRIFAFRI 71 08/05/2021    EGFRIFNONA 59 (L) 08/05/2021    BCR 19 04/28/2022    ANIONGAP 5.0 08/16/2016       Lab Results   Component Value Date    WBC 5.8 04/28/2022    HGB 13.7 04/28/2022    HCT 41.5 04/28/2022    MCV 94 04/28/2022     04/28/2022     Lab Results   Component Value Date    HGBA1C 7.4 (H) 04/28/2022          Lab Results   Component Value Date    PSA 1.160 04/28/2022    PSA 1.020 05/12/2021    PSA 1.080 06/23/2020       Radiographic Studies  MRI Cervical Spine Without Contrast    Result Date: 6/17/2022  Multilevel degenerative disc disease and spondylosis as described.  No focal disc protrusion or significant central canal stenosis.  This report was signed and finalized on 6/17/2022 8:31 AM by Tayo Davies MD.    MRI Thoracic Spine Without Contrast    Result Date: 6/17/2022  Multilevel mild degenerative disc disease with multiple disc bulges.  No focal disc protrusion or significant central canal stenosis.  This report was signed and finalized on 6/17/2022 8:48 AM by Tayo Davies MD.    MRI Lumbar Spine Without Contrast    Result Date: 6/17/2022  Multilevel degenerative disc disease and " spondylosis with bilateral neural foraminal narrowing as described.  Bilateral L4 pars defects with grade 2 anterolisthesis of L4 on L5 and severe bilateral neural foraminal narrowing.    This report was signed and finalized on 6/17/2022 8:40 AM by Tayo Davies MD.      I have reviewed above labs and imaging.     • CYSTOSCOPY  Preprocedure diagnosis  LUTS  Postprocedure diagnosis  Same  Procedure  Flexible Cystourethroscopy  Attending surgeon  Maury De La Garza MD  Anesthesia  2% lidocaine jelly intraurethrally  Complications  None  Indications  77 y.o. male undergoing a flexible cystoscopy for the above mentioned indications.  Informed consent was obtained.    Findings  Cystoscopic findings included one right and left ureteral orifice in the normal anatomic position with normal bladder mucosa and no tumors, masses or stones. The urethral urothelium was within normal limits with no strictures.  There was not a prominent median lobe.  The lateral lobes were quite obstructive in appearance.    Procedure  The patient was placed in supine position and prepped and draped in sterile fashion with lidocaine jelly per urethra for anesthesia.  A timeout was performed.  The 14F flexible cystoscope was lubricated and gently placed through the penile urethra and into the bladder.  The bladder was completely visualized.  The cystoscope was retroflexed and the bladder neck and prostate visualized.  The cystoscope was slowly withdrawn while visualizing the urethra and the procedure terminated.  The patient tolerated the procedure well.      • TRUS OF PROSTATE   Preoperative diagnosis  LUTS  Postoperative diagnosis  Same  Procedure  1.  Transrectal ultrasound of the prostate  Attending Surgeon  Maury De La Garza MD  Anesthesia  2% lidocaine jelly, intrarectal instillation, 10mL  Complications  None  Specimen  None  Indications  Mr. Berg is a 77 y.o. male with LUTS.  He presents for prostate ultrasound to evaluate prostate  size.  Procedure  The patient was positioned and prepped in a left lateral position with lower extremities flexed.  Lidocaine jelly, 2%, was injected per rectum. A digital rectal exam was performed which demonstrated a smooth prostate without nodules or induration. The Architizer E8CS rectal ultrasound probe was slowly introduced into the rectum without difficulty.  The prostate and seminal vesicles were inspected systematically using cross and sagittal views with the ultrasound. The dimensions of the prostate were measured, for a calculated volume of 35 mL with 5.02 cm prostatic width.  The seminal vesicles appeared normal.  The rectal ultrasound probe was removed.  The patient tolerated the procedure well.    • UROFLOW  Peak flow rate - 8.32 mL/sec  Average flow rate - 3.6 mL/sec  Flow curve - flat low  Voided volume - 269 mL    PVR  Post-void residual performed with ultrasound scanner by staff and interpreted by me - 109      I personally reviewed  and interpreted this study.       Assessment  77 y.o. male with worsening of chronic lower urinary tract symptoms.     In a separate room and encounter after his workup, we discussed options including MMT and MIST. He wants to get off Rx.     Plan  1. Schedule for Urolift at [place]. Risks are DM

## 2022-07-07 NOTE — PATIENT INSTRUCTIONS
Dr. De La Garza's Preoperative Instructions Before and After UroLift Procedure at the surgery center  The following instructions will help you care for yourself, or be cared for before and after your procedure.      Diet  Drink plenty of liquids and eat light meals after the procedure.  It is very important to treat plenty of fluids  after your procedure.   Do not eat or drink anything after midnight the night before your procedure.    Anesthesia Precautions & Expectations  You will receive a twilight sedation anesthetic.  After anesthesia, rest for 24 hours.    Do not drive, drink alcoholic beverages or make any important decisions during this time.  You will need someone to drive you the day of your procedure.  Please take the medications 1 hour prior to your procedure.      What to take before the procedure  We will give you an antibiotic to be taken one hour ahead of time that day and for the next 3 days.   We will also give you an anti-inflammatory medication and a medication for urinary burning to be taken starting that day and for the next few days.     Take all the medicines 1 hour before your procedure.    Activity  Start normal activities in twenty-four (24) hours.    Wound Care and Hygiene  No restrictions, start normal routine.    What to Expect after Surgery  Mild pain with voiding.  Frequency or urgency - expect these to occur for 2-4 weeks after surgery. Call if these are severe and we will give a medication to help with them.  Bladder cramps.  Minimal bleeding with voiding.    Call your Doctor  Passing clots in urine preventing bladder emptying  Severe pain not controlled by oral medication  Temperature above 101.5 degrees  Inability to urinate within eight (8) hours after surgery    Other Contacts  Urology Office:  [address]   [place] [id number] [phone] office  [phone] fax

## 2022-07-26 ENCOUNTER — OUTSIDE FACILITY SERVICE (OUTPATIENT)
Dept: UROLOGY | Facility: CLINIC | Age: 77
End: 2022-07-26

## 2022-07-26 PROCEDURE — 52441 CYSTO INSJ TRNSPRSTC 1 IMPLT: CPT | Performed by: UROLOGY

## 2022-07-26 PROCEDURE — 52442 CYSTO INS TRNSPRSTC IMPLT EA: CPT | Performed by: UROLOGY

## 2022-08-02 ENCOUNTER — OFFICE VISIT (OUTPATIENT)
Dept: NEUROSURGERY | Facility: CLINIC | Age: 77
End: 2022-08-02

## 2022-08-02 VITALS — WEIGHT: 206.4 LBS | BODY MASS INDEX: 27.35 KG/M2 | HEIGHT: 73 IN | TEMPERATURE: 97.8 F

## 2022-08-02 DIAGNOSIS — M51.36 DDD (DEGENERATIVE DISC DISEASE), LUMBAR: ICD-10-CM

## 2022-08-02 DIAGNOSIS — M79.604 RIGHT LEG PAIN: ICD-10-CM

## 2022-08-02 DIAGNOSIS — M43.16 SPONDYLOLISTHESIS OF LUMBAR REGION: Primary | ICD-10-CM

## 2022-08-02 PROCEDURE — 99203 OFFICE O/P NEW LOW 30 MIN: CPT | Performed by: NEUROLOGICAL SURGERY

## 2022-08-02 NOTE — PROGRESS NOTES
Patient: Fazal Berg III  : 1945    Primary Care Provider: Matthew Carr MD    Requesting Provider: As above        History    Chief Complaint: Chronic low back pain with sensory alteration and pain in the legs.    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  This has slowly ascended up into his inguinal area over time.  The symptoms are constant.  They are not positional.  Nothing really makes them better or worse.  He underwent lumbar surgery by Dr. Montenegro about 15 years ago.  He has chronic low back pain.  He has some occasional neck stiffness.  He does have diabetes.    Review of Systems   Constitutional: Negative for activity change, appetite change, chills, diaphoresis, fatigue, fever and unexpected weight change.   HENT: Negative for congestion, dental problem, drooling, ear discharge, ear pain, facial swelling, hearing loss, mouth sores, nosebleeds, postnasal drip, rhinorrhea, sinus pressure, sinus pain, sneezing, sore throat, tinnitus, trouble swallowing and voice change.    Eyes: Negative for photophobia, pain, discharge, redness, itching and visual disturbance.   Respiratory: Negative for apnea, cough, choking, chest tightness, shortness of breath, wheezing and stridor.    Cardiovascular: Negative for chest pain, palpitations and leg swelling.   Gastrointestinal: Negative for abdominal distention, abdominal pain, anal bleeding, blood in stool, constipation, diarrhea, nausea, rectal pain and vomiting.   Endocrine: Negative for cold intolerance, heat intolerance, polydipsia, polyphagia and polyuria.   Genitourinary: Negative for decreased urine volume, difficulty urinating, dysuria, enuresis, flank pain, frequency, genital sores, hematuria, penile discharge, penile pain, penile swelling, scrotal swelling, testicular pain and  "urgency.   Musculoskeletal: Positive for back pain. Negative for arthralgias, gait problem, joint swelling, myalgias, neck pain and neck stiffness.   Skin: Negative for color change, pallor, rash and wound.   Allergic/Immunologic: Negative for environmental allergies, food allergies and immunocompromised state.   Neurological: Positive for numbness. Negative for dizziness, tremors, seizures, syncope, facial asymmetry, speech difficulty, weakness, light-headedness and headaches.   Hematological: Negative for adenopathy. Does not bruise/bleed easily.   Psychiatric/Behavioral: Negative for agitation, behavioral problems, confusion, decreased concentration, dysphoric mood, hallucinations, self-injury, sleep disturbance and suicidal ideas. The patient is not nervous/anxious and is not hyperactive.        The patient's past medical history, past surgical history, family history, and social history have been reviewed at length in the electronic medical record.      Physical Exam:   Temp 97.8 °F (36.6 °C) (Infrared)   Ht 185.4 cm (73\")   Wt 93.6 kg (206 lb 6.4 oz)   BMI 27.23 kg/m²   CONSTITUTIONAL: Patient is well-nourished, pleasant and appears stated age.  MUSCULOSKELETAL:  Straight leg raising is negative.  Oumar's Sign is negative.  ROM in the low back is normal.  Tenderness in the back to palpation is not observed.  NEUROLOGICAL:  Orientation, memory, attention span, language function, and cognition have been examined and are intact.  Strength is intact in the lower extremities to direct testing.  Muscle tone is normal throughout.  Station and gait are normal.  Sensation is altered to light touch testing globally in his legs.  Deep tendon reflexes are difficult to elicit throughout.  Coordination is intact.      Medical Decision Making    Data Review:   (All imaging studies were personally reviewed unless stated otherwise)  MRIs dated 6/16/2022 of the entire axial spine have been obtained.  The reason given for " the study was syringobulbia/syringomyelia.  I do not see any evidence of syringobulbia or syringomyelia.  He has some diffuse degenerative disc disease and spondylosis in his neck and mid back.  There is no cord compromise.  Lumbar study demonstrates diffuse degenerative disc disease and facet arthropathy.  There is a grade 2 spondylolisthesis of L4 on L5 with generous stenosis.  I suspect that this his previously operated level.    Diagnosis:   1.  Probable peripheral neuropathy.  2.  Chronic mechanical low back pain.  3.  L4-5 spondylolisthesis.    Treatment Options:   I am going to refer the patient for electrodiagnostic studies of his lower extremities.  He will follow-up thereafter.  His wife takes gabapentin and he is not enthusiastic about the thought of that however that may be appropriate if indeed he has a significant neuropathy.  For completeness I am going to check flexion-extension upright lateral lumbar spine x-rays to assess for instability as well.       Diagnosis Plan   1. Spondylolisthesis of lumbar region     2. DDD (degenerative disc disease), lumbar     3. Right leg pain         Scribed for Madhav Acosta MD by ZORA Velasquez 8/2/2022 14:06 EDT      I, Dr. Acosta, personally performed the services described in the documentation, as scribed in my presence, and it is both accurate and complete.

## 2022-08-23 ENCOUNTER — HOSPITAL ENCOUNTER (OUTPATIENT)
Dept: GENERAL RADIOLOGY | Facility: HOSPITAL | Age: 77
Discharge: HOME | End: 2022-08-23
Admitting: NEUROLOGICAL SURGERY

## 2022-08-23 ENCOUNTER — OFFICE VISIT (OUTPATIENT)
Dept: UROLOGY | Facility: CLINIC | Age: 77
End: 2022-08-23

## 2022-08-23 VITALS
TEMPERATURE: 97.9 F | HEART RATE: 66 BPM | HEIGHT: 73 IN | WEIGHT: 206.4 LBS | BODY MASS INDEX: 27.35 KG/M2 | DIASTOLIC BLOOD PRESSURE: 74 MMHG | OXYGEN SATURATION: 100 % | SYSTOLIC BLOOD PRESSURE: 136 MMHG

## 2022-08-23 DIAGNOSIS — C61 PROSTATE CANCER: Primary | ICD-10-CM

## 2022-08-23 DIAGNOSIS — M43.16 SPONDYLOLISTHESIS OF LUMBAR REGION: ICD-10-CM

## 2022-08-23 DIAGNOSIS — R35.1 BENIGN PROSTATIC HYPERPLASIA WITH NOCTURIA: ICD-10-CM

## 2022-08-23 DIAGNOSIS — N40.1 BENIGN PROSTATIC HYPERPLASIA WITH NOCTURIA: ICD-10-CM

## 2022-08-23 LAB
BILIRUB BLD-MCNC: NEGATIVE MG/DL
CLARITY, POC: CLEAR
COLOR UR: YELLOW
EXPIRATION DATE: ABNORMAL
GLUCOSE UR STRIP-MCNC: NEGATIVE MG/DL
KETONES UR QL: NEGATIVE
LEUKOCYTE EST, POC: NEGATIVE
Lab: ABNORMAL
NITRITE UR-MCNC: NEGATIVE MG/ML
PH UR: 6 [PH] (ref 5–8)
PROT UR STRIP-MCNC: ABNORMAL MG/DL
RBC # UR STRIP: NEGATIVE /UL
SP GR UR: 1.02 (ref 1–1.03)
UROBILINOGEN UR QL: NORMAL

## 2022-08-23 PROCEDURE — 99214 OFFICE O/P EST MOD 30 MIN: CPT | Performed by: PHYSICIAN ASSISTANT

## 2022-08-23 PROCEDURE — 51798 US URINE CAPACITY MEASURE: CPT | Performed by: PHYSICIAN ASSISTANT

## 2022-08-23 PROCEDURE — 81003 URINALYSIS AUTO W/O SCOPE: CPT | Performed by: PHYSICIAN ASSISTANT

## 2022-08-23 PROCEDURE — 72120 X-RAY BEND ONLY L-S SPINE: CPT

## 2022-08-23 NOTE — PROGRESS NOTES
Chief Complaint   Patient presents with   • Follow-up     4 week follow up with IPSS and PVR.         HPI  Mr. Berg is a 77 y.o. male with history of BPH s/p Urolift 07/26/22 and low-grade prostate cancer on AS who presents for follow up.     At this visit, has noticed an improvement in all of his LUTS.  He denies any ongoing dysuria and hematuria following surgery.  He has stopped Proscar.    IPSS Questionnaire (AUA-7):  Incomplete emptying  Over the past month, how often have you had a sensation of not emptying your bladder completely after you finish?: Less than half the time (08/23/22 1413)  Frequency  Over the past month, how often have you had to urinate again less than two hours after you finishing urinating ?: Less than 1 time in 5 (08/23/22 1413)  Intermittency  Over the past month, how often have you found you stopped and started again several time when you urinated ?: Less than half the time (08/23/22 1413)  Urgency  Over the last month, how difficult  have you found it to postpone urination ?: Less than 1 time in 5 (08/23/22 1413)  Weak Stream  Over the past month, how often have you had a weak urinary stream ?: Less than half the time (08/23/22 1413)  Straining  Over the past month, how often have you had to push or strain to begin urination ?: Not at all (08/23/22 1413)  Nocturia  Over the past month, how many times did you most typically get up to urinate from the time you went to bed until the time you got up in the morning ?: Less than half the time (08/23/22 1413)  Quality of life due to urinary symptoms  If you were to spend the rest of your life with your urinary condition the way it is now, how would feel about that?: Mixed - about equally satisfied (08/23/22 1413)    Scores  Total IPSS Score: 10 (08/23/22 1413)  Total Score = Symtomatic Level: moderately symptomatic: 8-19 (08/23/22 1413)       Past Medical History:   Diagnosis Date   • Acute asthma exacerbation    • Acute sinusitis    •  "Acute URI    • Allergic rhinitis    • Anemia    • Arthritis    • Asthma    • Benign prostatic hypertrophy    • Bronchitis    • Chest pain     \"I'VE SEEN DR. CARRASCO FOR IT A LONG TIME AGO, AND THAT'S WHEN HE DID TESTING\".  STATES HAS NOT MENTIONED IT TO DR. KHAN.  STATES UNSURE OF WHEN LAST TIME WAS\".  PT STATES \"I'M NOT A COMPLAINER\".  STATES COLD WEATHER AND WALKING BRING IT ON.  GETS SHORTNESS OF BREATH OCCASSIONALLY.  MORE DESCRIBED AS PRESSURE.    • ED (erectile dysfunction)    • Elevated cholesterol    • Family history of deafness and hearing loss    • Full dentures     INSTRUCTED NO ADHESIVES THE DOS   • GERD (gastroesophageal reflux disease)    • H/O echocardiogram 2003   • Hiatal hernia    • History of fracture     REPORTS TOE ON RIGHT FOOT - NO SURGICAL INTERVENTION REQUIRED   • [place] (hard of hearing)     SPOUSE REPORTS NO USE OF HEARING AIDS   • Hx of exercise stress test     SPOUSE REPORTS LAST DONE BY DR ALMANZA AROUND JUNE 2018 AND REPORTED ALL WNL'S AND MD RELEASED PATIENT   • Hyperlipidemia    • Hypertension    • Jaundice     AS A TEEN    • Obstructive sleep apnea     NO CPAP - SPOUSE REPORTS \"HE WOULDN'T DO IT\"   • Peripheral neuropathy    • Pneumonia     SPOUSE REPORTS HISTORY   • Prostate cancer (HCC)    • Short of breath on exertion    • Skin cancer    • Type 2 diabetes mellitus (HCC)    • Urinary frequency    • Vitamin B 12 deficiency    • Vitamin D deficiency    • Wears glasses        Past Surgical History:   Procedure Laterality Date   • APPENDECTOMY     • BACK SURGERY      Dr. Montenegro - 1997?   • CARDIAC CATHETERIZATION  2003?    \"IT WAS OK\"   • CHOLECYSTECTOMY  06/2018   • CHOLECYSTECTOMY WITH INTRAOPERATIVE CHOLANGIOGRAM N/A 05/15/2018    Procedure: CHOLECYSTECTOMY LAPAROSCOPIC INTRAOPERATIVE CHOLANGIOGRAM;  Surgeon: Eric Bran MD;  Location: [facility];  Service: General   • COLONOSCOPY     • COLONOSCOPY N/A 08/08/2018    Procedure: COLONOSCOPY;  Surgeon: Eric Bran MD;  " "Location: [facility] ENDOSCOPY;  Service: Gastroenterology   • ENDOSCOPY     • HAND SURGERY Left    • KNEE ARTHROSCOPY Left    • MOUTH SURGERY      FULL MOUTH EXTRACTION   • PROSTATE SURGERY  07/26/2022    Maury Rodriguez   • SKIN CANCER EXCISION  07/22/2019    skin cancer on head         Current Outpatient Medications:   •  Accu-Chek Alondra Plus test strip, TEST THREE TIMES DAILY  AS  INSTRUCTED, Disp: 300 each, Rfl: 3  •  ACCU-CHEK SOFTCLIX LANCETS lancets, 1 each by Other route 3 (Three) Times a Day. Use as instructed, Disp: 300 each, Rfl: 3  •  albuterol sulfate  (90 Base) MCG/ACT inhaler, Inhale 2 puffs Every 4 (Four) Hours As Needed for Wheezing or Shortness of Air., Disp: 8 g, Rfl: 2  •  aspirin 81 MG EC tablet, Take 81 mg by mouth Daily., Disp: , Rfl:   •  Blood Glucose Calibration (SURECHEK CONTROL SOLUTION) NORMAL liquid, 1 bottle by In Vitro route every 30 (thirty) days., Disp: 3 each, Rfl: 3  •  glimepiride (AMARYL) 4 MG tablet, Take 1 tablet by mouth Daily., Disp: 90 tablet, Rfl: 3  •  Januvia 100 MG tablet, TAKE 1 TABLET EVERY DAY, Disp: 90 tablet, Rfl: 3  •  losartan-hydrochlorothiazide (HYZAAR) 50-12.5 MG per tablet, Take 1 tablet by mouth Daily., Disp: 90 tablet, Rfl: 3  •  metFORMIN (GLUCOPHAGE) 500 MG tablet, Take 1 tablet by mouth 2 (Two) Times a Day With Meals. (Patient taking differently: Take 500 mg by mouth 2 (Two) Times a Day With Meals. \"Take it once\"), Disp: 180 tablet, Rfl: 3  •  bisacodyl (bisacodyl) 5 MG EC tablet, Take 1 tablet by mouth Daily., Disp: 4 tablet, Rfl: 0  •  Blood Gluc Meter Disp-Strips device, 1 strip 3 (Three) Times a Day. Patient needs Accu-chek meter and test strips., Disp: 1 each, Rfl: 0  •  Blood Glucose Monitoring Suppl (Accu-Chek Alondra Plus) w/Device kit, for testing sugar daily, Disp: 1 kit, Rfl: 0  •  Cholecalciferol (VITAMIN D) 2000 UNITS tablet, Take 1 capsule by mouth 3 (Three) Times a Week., Disp: , Rfl:   •  Chromium-Cinnamon 100-500 MCG-MG capsule, " "Take 2 capsules by mouth 2 (two) times a day., Disp: , Rfl:   •  sulfamethoxazole-trimethoprim (Bactrim DS) 800-160 MG per tablet, Take 1 tablet by mouth 2 (Two) Times a Day. Start taking the morning of the procedure, Disp: 6 tablet, Rfl: 0     Physical Exam  Visit Vitals  /74 (BP Location: Left arm, Patient Position: Sitting, Cuff Size: Adult)   Pulse 66   Temp 97.9 °F (36.6 °C) (Temporal)   Ht 185.4 cm (73\")   Wt 93.6 kg (206 lb 6.4 oz)   SpO2 100%   BMI 27.23 kg/m²       Labs  Brief Urine Lab Results  (Last result in the past 365 days)      Color   Clarity   Blood   Leuk Est   Nitrite   Protein   CREAT   Urine HCG        08/23/22 1417 Yellow   Clear   Negative   Negative   Negative   Trace                 Lab Results   Component Value Date    GLUCOSE 140 (H) 04/28/2022    CALCIUM 10.6 (H) 04/28/2022     04/28/2022    K 4.4 04/28/2022    CO2 23 04/28/2022     04/28/2022    BUN 25 04/28/2022    CREATININE 1.30 (H) 04/28/2022    EGFRIFAFRI 71 08/05/2021    EGFRIFNONA 59 (L) 08/05/2021    BCR 19 04/28/2022    ANIONGAP 5.0 08/16/2016       Lab Results   Component Value Date    WBC 5.8 04/28/2022    HGB 13.7 04/28/2022    HCT 41.5 04/28/2022    MCV 94 04/28/2022     04/28/2022            Lab Results   Component Value Date    PSA 1.160 04/28/2022    PSA 1.020 05/12/2021    PSA 1.080 06/23/2020         PVR  Post-void residual performed with ultrasound scanner by staff and interpreted by me -0 cc    I have reviewed above labs and imaging.     Assessment  77 y.o. male with history of BPH s/p UroLift on 7/26/2022 and low-grade prostate cancer on active surveillance presenting for 1 month postop follow-up.  His IPSS has improved from 18-10 and he is off of Proscar.  He is eager to see ongoing improvement in urgency and nocturia with time.    Plan  1.  Follow-up for annual exam with PSA when the patient returns from [place] in May 2023    "

## 2022-08-29 ENCOUNTER — OFFICE VISIT (OUTPATIENT)
Dept: INTERNAL MEDICINE | Facility: CLINIC | Age: 77
End: 2022-08-29

## 2022-08-29 VITALS
SYSTOLIC BLOOD PRESSURE: 120 MMHG | RESPIRATION RATE: 16 BRPM | WEIGHT: 208 LBS | HEART RATE: 70 BPM | BODY MASS INDEX: 27.57 KG/M2 | OXYGEN SATURATION: 97 % | TEMPERATURE: 98.4 F | HEIGHT: 73 IN | DIASTOLIC BLOOD PRESSURE: 72 MMHG

## 2022-08-29 DIAGNOSIS — Z00.00 ROUTINE GENERAL MEDICAL EXAMINATION AT A HEALTH CARE FACILITY: Primary | ICD-10-CM

## 2022-08-29 DIAGNOSIS — E11.65 TYPE 2 DIABETES MELLITUS WITH HYPERGLYCEMIA, WITHOUT LONG-TERM CURRENT USE OF INSULIN: ICD-10-CM

## 2022-08-29 PROCEDURE — 1159F MED LIST DOCD IN RCRD: CPT | Performed by: INTERNAL MEDICINE

## 2022-08-29 PROCEDURE — 96160 PT-FOCUSED HLTH RISK ASSMT: CPT | Performed by: INTERNAL MEDICINE

## 2022-08-29 PROCEDURE — 1125F AMNT PAIN NOTED PAIN PRSNT: CPT | Performed by: INTERNAL MEDICINE

## 2022-08-29 PROCEDURE — 99397 PER PM REEVAL EST PAT 65+ YR: CPT | Performed by: INTERNAL MEDICINE

## 2022-08-29 PROCEDURE — G0439 PPPS, SUBSEQ VISIT: HCPCS | Performed by: INTERNAL MEDICINE

## 2022-08-29 PROCEDURE — 1170F FXNL STATUS ASSESSED: CPT | Performed by: INTERNAL MEDICINE

## 2022-08-29 NOTE — PROGRESS NOTES
"QUICK REFERENCE INFORMATION:  The ABCs of the Annual Wellness Visit    Medicare Annual Wellness Visit    Subjective   History of Present Illness    Fazal Berg III is a 77 y.o. male who presents for an Annual Wellness Visit. In addition, we addressed the following health issues:       PMH, PSH, SocHx, FamHx, Allergies, and Medications: Reviewed and updated.     Outpatient Medications Prior to Visit   Medication Sig Dispense Refill   • Accu-Chek Alondra Plus test strip TEST THREE TIMES DAILY  AS  INSTRUCTED 300 each 3   • ACCU-CHEK SOFTCLIX LANCETS lancets 1 each by Other route 3 (Three) Times a Day. Use as instructed 300 each 3   • albuterol sulfate  (90 Base) MCG/ACT inhaler Inhale 2 puffs Every 4 (Four) Hours As Needed for Wheezing or Shortness of Air. 8 g 2   • aspirin 81 MG EC tablet Take 81 mg by mouth Daily.     • bisacodyl (bisacodyl) 5 MG EC tablet Take 1 tablet by mouth Daily. 4 tablet 0   • Blood Gluc Meter Disp-Strips device 1 strip 3 (Three) Times a Day. Patient needs Accu-chek meter and test strips. 1 each 0   • Blood Glucose Calibration (SURECHEK CONTROL SOLUTION) NORMAL liquid 1 bottle by In Vitro route every 30 (thirty) days. 3 each 3   • Blood Glucose Monitoring Suppl (Accu-Chek Alondra Plus) w/Device kit for testing sugar daily 1 kit 0   • Cholecalciferol (VITAMIN D) 2000 UNITS tablet Take 1 capsule by mouth 3 (Three) Times a Week.     • Chromium-Cinnamon 100-500 MCG-MG capsule Take 2 capsules by mouth 2 (two) times a day.     • glimepiride (AMARYL) 4 MG tablet Take 1 tablet by mouth Daily. 90 tablet 3   • Januvia 100 MG tablet TAKE 1 TABLET EVERY DAY 90 tablet 3   • losartan-hydrochlorothiazide (HYZAAR) 50-12.5 MG per tablet Take 1 tablet by mouth Daily. 90 tablet 3   • metFORMIN (GLUCOPHAGE) 500 MG tablet Take 1 tablet by mouth 2 (Two) Times a Day With Meals. (Patient taking differently: Take 500 mg by mouth 2 (Two) Times a Day With Meals. \"Take it once\") 180 tablet 3   • " sulfamethoxazole-trimethoprim (Bactrim DS) 800-160 MG per tablet Take 1 tablet by mouth 2 (Two) Times a Day. Start taking the morning of the procedure 6 tablet 0     No facility-administered medications prior to visit.       Patient Active Problem List   Diagnosis   • Menstrual disorder   • Asthma with acute exacerbation   • Acute sinusitis   • Acute upper respiratory infection   • Asthma   • Benign prostatic hyperplasia   • Gastroesophageal reflux disease   • Hyperlipidemia   • Hypertension   • Obstructive sleep apnea syndrome   • Peripheral neuropathy   • Dyspnea on exertion   • Malignant neoplasm of skin   • Type 2 diabetes, controlled, with peripheral neuropathy (HCC)   • Cobalamin deficiency   • Vitamin D deficiency   • Skin lesion of face   • Arthritis   • Calculus of gallbladder with acute cholecystitis without obstruction   • Encounter for screening colonoscopy       Health Habits:  Dental Exam. up to date  Eye Exam. up to date  No exam data present  Exercise: 0 times/week.  Current exercise activities include: none    Health Risk Assessment:  The patient has completed a Health Risk Assessment. This has been reviewed with them and has been scanned into the patient's chart.    Current Medical Providers:  Patient Care Team:  Matthew Carr MD as PCP - General (Internal Medicine)  Eric Bran MD as Consulting Physician (General Surgery)  Georgina Muse MD (Dermatology)  Madhav Acosta MD as Surgeon (Neurosurgery)  Vargas Evans OD (Optometry)  Willy Tovar DPM as Consulting Physician (Podiatry)  Vargas Jones MD as Consulting Physician (Neurology)    The [facility] providers who are involved in the care of this patient are listed above. Additional providers and suppliers are listed below:      Recent Hospitalizations:  No hospitalization(s) within the last year..    Recent Lab Results:  CMP:  Lab Results   Component Value Date    BUN 25 04/28/2022    CREATININE 1.30 (H)  04/28/2022    EGFRIFNONA 59 (L) 08/05/2021    EGFRIFAFRI 71 08/05/2021    BCR 19 04/28/2022     04/28/2022    K 4.4 04/28/2022    CO2 23 04/28/2022    CALCIUM 10.6 (H) 04/28/2022    PROTENTOTREF 6.7 04/28/2022    ALBUMIN 4.4 04/28/2022    ALBUMIN 3.8 04/28/2022    LABGLOBREF 2.3 04/28/2022    LABGLOBREF 2.9 04/28/2022    LABIL2 1.9 04/28/2022    LABIL2 1.3 04/28/2022    BILITOT 0.6 04/28/2022    ALKPHOS 66 04/28/2022    AST 16 04/28/2022    ALT 13 04/28/2022       LIPID PANEL:  Lab Results   Component Value Date    CHLPL 203 (H) 08/05/2021    TRIG 278 (H) 08/05/2021    HDL 41 08/05/2021    VLDL 48 (H) 08/05/2021     (H) 08/05/2021       HbA1c:  Lab Results   Component Value Date    HGBA1C 7.4 (H) 04/28/2022       URINE MICROALBUMIN:  Lab Results   Component Value Date    MICROALBUR 45.8 08/05/2021       PSA:  Lab Results   Component Value Date    PSA 1.160 04/28/2022       Age-appropriate Screening Schedule:  Refer to the list below for future screening recommendations based on patient's age, sex and/or medical conditions. Orders for these recommended tests are listed in the plan section. The patient has been provided with a written plan.    Health Maintenance   Topic Date Due   • DIABETIC FOOT EXAM  05/12/2022   • LIPID PANEL  08/05/2022   • URINE MICROALBUMIN  08/05/2022   • INFLUENZA VACCINE  10/01/2022   • HEMOGLOBIN A1C  10/28/2022   • DIABETIC EYE EXAM  07/13/2023   • TDAP/TD VACCINES (3 - Td or Tdap) 02/09/2027   • ZOSTER VACCINE  Completed       Depression Screen:   PHQ-2/PHQ-9 Depression Screening 8/29/2022   Retired PHQ-9 Total Score -   Retired Total Score -   Little Interest or Pleasure in Doing Things 0-->not at all   Feeling Down, Depressed or Hopeless 1-->several days   PHQ-9: Brief Depression Severity Measure Score 1         Functional and Cognitive Screening:  Functional & Cognitive Status 8/29/2022   Do you have difficulty preparing food and eating? No   Do you have difficulty bathing  yourself, getting dressed or grooming yourself? No   Do you have difficulty using the toilet? No   Do you have difficulty moving around from place to place? No   Do you have trouble with steps or getting out of a bed or a chair? Yes   Current Diet Well Balanced Diet   Dental Exam Up to date        Dental Exam Comment -   Eye Exam Up to date   Exercise (times per week) 5 times per week   Current Exercises Include Yard Work;Walking   Current Exercise Activities Include -   Do you need help using the phone?  No   Are you deaf or do you have serious difficulty hearing?  Yes   Do you need help with transportation? No   Do you need help shopping? No   Do you need help preparing meals?  No   Do you need help with housework?  No   Do you need help with laundry? No   Do you need help taking your medications? No   Do you need help managing money? No   Do you ever drive or ride in a car without wearing a seat belt? No   Have you felt unusual stress, anger or loneliness in the last month? No   Who do you live with? Spouse   If you need help, do you have trouble finding someone available to you? No   Have you been bothered in the last four weeks by sexual problems? No   Do you have difficulty concentrating, remembering or making decisions? Yes       Does the patient have evidence of cognitive impairment? No    Advanced Care Planning:  ACP discussion was declined by the patient. Patient has an advance directive (not in EMR), copy requested.    Identification of Risk Factors:  Risk factors include: Advance Directive Discussion  Fall Risk.    Review of Systems   Constitutional: Positive for fatigue.   Respiratory: Negative for shortness of breath.    Psychiatric/Behavioral: Negative for self-injury and sleep disturbance. The patient is not nervous/anxious.    All other systems reviewed and are negative.      Compared to one year ago, the patient feels his physical health is the same.  Compared to one year ago, the patient feels his  "mental health is the same.    Objective     Physical Exam  Vitals reviewed.   HENT:      Head: Normocephalic and atraumatic.      Right Ear: Tympanic membrane normal.      Left Ear: Tympanic membrane normal.      Nose: Nose normal.      Mouth/Throat:      Mouth: Mucous membranes are moist.   Cardiovascular:      Rate and Rhythm: Normal rate.   Pulmonary:      Effort: Pulmonary effort is normal.   Abdominal:      General: Abdomen is flat.   Musculoskeletal:      Cervical back: Normal range of motion.   Skin:     Capillary Refill: Capillary refill takes less than 2 seconds.   Neurological:      General: No focal deficit present.      Mental Status: He is alert.   Psychiatric:         Mood and Affect: Mood normal.         Vitals:    08/29/22 1312   BP: 120/72   Pulse: 70   Resp: 16   Temp: 98.4 °F (36.9 °C)   SpO2: 97%   Weight: 94.3 kg (208 lb)   Height: 185.4 cm (73\")   PainSc:   4   PainLoc: Knee       Body mass index is 27.44 kg/m².  Discussed the patient's BMI with him. The BMI is in the acceptable range.    Assessment & Plan   Patient Self-Management and Personalized Health Advice  The patient has been provided with information about: diet, exercise and weight management and preventive services including:   · Annual Wellness Visit (AWV).    Visit Diagnoses:  No diagnosis found.    No orders of the defined types were placed in this encounter.      Outpatient Encounter Medications as of 8/29/2022   Medication Sig Dispense Refill   • Accu-Chek Alondra Plus test strip TEST THREE TIMES DAILY  AS  INSTRUCTED 300 each 3   • ACCU-CHEK SOFTCLIX LANCETS lancets 1 each by Other route 3 (Three) Times a Day. Use as instructed 300 each 3   • albuterol sulfate  (90 Base) MCG/ACT inhaler Inhale 2 puffs Every 4 (Four) Hours As Needed for Wheezing or Shortness of Air. 8 g 2   • aspirin 81 MG EC tablet Take 81 mg by mouth Daily.     • bisacodyl (bisacodyl) 5 MG EC tablet Take 1 tablet by mouth Daily. 4 tablet 0   • Blood Gluc " "Meter Disp-Strips device 1 strip 3 (Three) Times a Day. Patient needs Accu-chek meter and test strips. 1 each 0   • Blood Glucose Calibration (SURECHEK CONTROL SOLUTION) NORMAL liquid 1 bottle by In Vitro route every 30 (thirty) days. 3 each 3   • Blood Glucose Monitoring Suppl (Accu-Chek Alondra Plus) w/Device kit for testing sugar daily 1 kit 0   • Cholecalciferol (VITAMIN D) 2000 UNITS tablet Take 1 capsule by mouth 3 (Three) Times a Week.     • Chromium-Cinnamon 100-500 MCG-MG capsule Take 2 capsules by mouth 2 (two) times a day.     • glimepiride (AMARYL) 4 MG tablet Take 1 tablet by mouth Daily. 90 tablet 3   • Januvia 100 MG tablet TAKE 1 TABLET EVERY DAY 90 tablet 3   • losartan-hydrochlorothiazide (HYZAAR) 50-12.5 MG per tablet Take 1 tablet by mouth Daily. 90 tablet 3   • metFORMIN (GLUCOPHAGE) 500 MG tablet Take 1 tablet by mouth 2 (Two) Times a Day With Meals. (Patient taking differently: Take 500 mg by mouth 2 (Two) Times a Day With Meals. \"Take it once\") 180 tablet 3   • [DISCONTINUED] sulfamethoxazole-trimethoprim (Bactrim DS) 800-160 MG per tablet Take 1 tablet by mouth 2 (Two) Times a Day. Start taking the morning of the procedure 6 tablet 0     No facility-administered encounter medications on file as of 8/29/2022.       Reviewed use of high risk medication in the elderly: yes  Reviewed for potential of harmful drug interactions in the elderly: yes    Follow Up:  No follow-ups on file.     An After Visit Summary and PPPS with all of these plans were given to the patient.             There are no diagnoses linked to this encounter.  "

## 2022-08-31 ENCOUNTER — TELEPHONE (OUTPATIENT)
Dept: NEUROSURGERY | Facility: CLINIC | Age: 77
End: 2022-08-31

## 2022-08-31 DIAGNOSIS — M89.9 RIB LESION: Primary | ICD-10-CM

## 2022-08-31 DIAGNOSIS — Z00.00 ROUTINE GENERAL MEDICAL EXAMINATION AT A HEALTH CARE FACILITY: ICD-10-CM

## 2022-08-31 DIAGNOSIS — Z12.5 PROSTATE CANCER SCREENING: ICD-10-CM

## 2022-08-31 DIAGNOSIS — E11.65 TYPE 2 DIABETES MELLITUS WITH HYPERGLYCEMIA, WITHOUT LONG-TERM CURRENT USE OF INSULIN: Primary | ICD-10-CM

## 2022-08-31 LAB
ALBUMIN SERPL-MCNC: 4.2 G/DL (ref 3.5–5.2)
ALBUMIN/GLOB SERPL: 2.2 G/DL
ALP SERPL-CCNC: 71 U/L (ref 39–117)
ALT SERPL-CCNC: 16 U/L (ref 1–41)
AST SERPL-CCNC: 17 U/L (ref 1–40)
BASOPHILS # BLD AUTO: 0.09 10*3/MM3 (ref 0–0.2)
BASOPHILS NFR BLD AUTO: 1.6 % (ref 0–1.5)
BILIRUB SERPL-MCNC: 0.4 MG/DL (ref 0–1.2)
BUN SERPL-MCNC: 18 MG/DL (ref 8–23)
BUN/CREAT SERPL: 14.1 (ref 7–25)
CALCIUM SERPL-MCNC: 9.8 MG/DL (ref 8.6–10.5)
CHLORIDE SERPL-SCNC: 102 MMOL/L (ref 98–107)
CHOLEST SERPL-MCNC: 170 MG/DL (ref 0–200)
CO2 SERPL-SCNC: 28.3 MMOL/L (ref 22–29)
CREAT SERPL-MCNC: 1.28 MG/DL (ref 0.76–1.27)
EGFRCR-CYS SERPLBLD CKD-EPI 2021: 58.4 ML/MIN/1.73
EOSINOPHIL # BLD AUTO: 0.61 10*3/MM3 (ref 0–0.4)
EOSINOPHIL NFR BLD AUTO: 10.7 % (ref 0.3–6.2)
ERYTHROCYTE [DISTWIDTH] IN BLOOD BY AUTOMATED COUNT: 12.5 % (ref 12.3–15.4)
GLOBULIN SER CALC-MCNC: 1.9 GM/DL
GLUCOSE SERPL-MCNC: 151 MG/DL (ref 65–99)
HBA1C MFR BLD: 8.6 % (ref 4.8–5.6)
HCT VFR BLD AUTO: 38 % (ref 37.5–51)
HDLC SERPL-MCNC: 41 MG/DL (ref 40–60)
HGB BLD-MCNC: 12.9 G/DL (ref 13–17.7)
IMM GRANULOCYTES # BLD AUTO: 0.03 10*3/MM3 (ref 0–0.05)
IMM GRANULOCYTES NFR BLD AUTO: 0.5 % (ref 0–0.5)
LDLC SERPL CALC-MCNC: 90 MG/DL (ref 0–100)
LYMPHOCYTES # BLD AUTO: 1.68 10*3/MM3 (ref 0.7–3.1)
LYMPHOCYTES NFR BLD AUTO: 29.5 % (ref 19.6–45.3)
MCH RBC QN AUTO: 31.8 PG (ref 26.6–33)
MCHC RBC AUTO-ENTMCNC: 33.9 G/DL (ref 31.5–35.7)
MCV RBC AUTO: 93.6 FL (ref 79–97)
MONOCYTES # BLD AUTO: 0.55 10*3/MM3 (ref 0.1–0.9)
MONOCYTES NFR BLD AUTO: 9.6 % (ref 5–12)
NEUTROPHILS # BLD AUTO: 2.74 10*3/MM3 (ref 1.7–7)
NEUTROPHILS NFR BLD AUTO: 48.1 % (ref 42.7–76)
NRBC BLD AUTO-RTO: 0 /100 WBC (ref 0–0.2)
PLATELET # BLD AUTO: 235 10*3/MM3 (ref 140–450)
POTASSIUM SERPL-SCNC: 4.4 MMOL/L (ref 3.5–5.2)
PROT SERPL-MCNC: 6.1 G/DL (ref 6–8.5)
RBC # BLD AUTO: 4.06 10*6/MM3 (ref 4.14–5.8)
SODIUM SERPL-SCNC: 141 MMOL/L (ref 136–145)
T4 FREE SERPL-MCNC: 1.07 NG/DL (ref 0.93–1.7)
TRIGL SERPL-MCNC: 230 MG/DL (ref 0–150)
TSH SERPL DL<=0.005 MIU/L-ACNC: 1.02 UIU/ML (ref 0.27–4.2)
VIT B12 SERPL-MCNC: 417 PG/ML (ref 211–946)
VLDLC SERPL CALC-MCNC: 39 MG/DL (ref 5–40)
WBC # BLD AUTO: 5.7 10*3/MM3 (ref 3.4–10.8)

## 2022-08-31 NOTE — PROGRESS NOTES
Please let them know the ha1c is runnig a bit liza. Needs a lot of improvement.  Diet and exercise. KETO diet specifically. No soda or sweat tea. Renal functionnot back to baseling but close.  Probaly renal damage for his DM that is not well controlled. If unable or difficulty get bs better rtc in 2-6 weeks to discuss options. If he can do better repeat labs on rtc in June next year.  Orders in

## 2022-09-12 ENCOUNTER — HOSPITAL ENCOUNTER (OUTPATIENT)
Dept: NUCLEAR MEDICINE | Facility: HOSPITAL | Age: 77
Discharge: HOME | End: 2022-09-12

## 2022-09-12 DIAGNOSIS — M89.9 RIB LESION: ICD-10-CM

## 2022-09-12 PROCEDURE — A9503 TC99M MEDRONATE: HCPCS | Performed by: NEUROLOGICAL SURGERY

## 2022-09-12 PROCEDURE — 0 TECHNETIUM MEDRONATE KIT: Performed by: NEUROLOGICAL SURGERY

## 2022-09-12 PROCEDURE — 78306 BONE IMAGING WHOLE BODY: CPT

## 2022-09-12 RX ORDER — TC 99M MEDRONATE 20 MG/10ML
26.1 INJECTION, POWDER, LYOPHILIZED, FOR SOLUTION INTRAVENOUS
Status: COMPLETED | OUTPATIENT
Start: 2022-09-12 | End: 2022-09-12

## 2022-09-12 RX ADMIN — TC 99M MEDRONATE 26.1 MILLICURIE: 20 INJECTION, POWDER, LYOPHILIZED, FOR SOLUTION INTRAVENOUS at 10:01

## 2022-09-13 ENCOUNTER — OFFICE VISIT (OUTPATIENT)
Dept: NEUROSURGERY | Facility: CLINIC | Age: 77
End: 2022-09-13

## 2022-09-13 VITALS — HEIGHT: 73 IN | WEIGHT: 200 LBS | BODY MASS INDEX: 26.51 KG/M2 | TEMPERATURE: 97.5 F | RESPIRATION RATE: 16 BRPM

## 2022-09-13 DIAGNOSIS — M79.671 FOOT PAIN, BILATERAL: ICD-10-CM

## 2022-09-13 DIAGNOSIS — M79.672 FOOT PAIN, BILATERAL: ICD-10-CM

## 2022-09-13 DIAGNOSIS — G60.9 HEREDITARY AND IDIOPATHIC PERIPHERAL NEUROPATHY: ICD-10-CM

## 2022-09-13 DIAGNOSIS — M54.9 MECHANICAL BACK PAIN: Primary | ICD-10-CM

## 2022-09-13 PROCEDURE — 99214 OFFICE O/P EST MOD 30 MIN: CPT | Performed by: NEUROLOGICAL SURGERY

## 2022-09-13 RX ORDER — GABAPENTIN 300 MG/1
300 CAPSULE ORAL TAKE AS DIRECTED
Qty: 90 CAPSULE | Refills: 1 | Status: SHIPPED | OUTPATIENT
Start: 2022-09-13 | End: 2023-01-16 | Stop reason: SDUPTHER

## 2022-09-13 NOTE — PROGRESS NOTES
Patient: Fazal Berg III  : 1945    Primary Care Provider: Matthew Carr MD    Requesting Provider: As above        History    Chief Complaint: Chronic low back pain with sensory alteration and pain in the legs.    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  This has slowly ascended up into his inguinal area over time.  The symptoms are constant.  They are not positional.  Nothing really makes them better or worse.  He underwent lumbar surgery by Dr. Montenegro about 15 years ago.  He has chronic low back pain.  He has occasional neck stiffness.  He does have diabetes.  He reports no change in his symptoms.      Review of Systems   Constitutional: Negative for activity change, appetite change, chills, diaphoresis, fatigue, fever and unexpected weight change.   HENT: Negative for congestion, dental problem, drooling, ear discharge, ear pain, facial swelling, hearing loss, mouth sores, nosebleeds, postnasal drip, rhinorrhea, sinus pressure, sneezing, sore throat, tinnitus, trouble swallowing and voice change.    Eyes: Negative for photophobia, pain, discharge, redness, itching and visual disturbance.   Respiratory: Negative for apnea, cough, choking, chest tightness, shortness of breath, wheezing and stridor.    Cardiovascular: Negative for chest pain, palpitations and leg swelling.   Gastrointestinal: Negative for abdominal distention, abdominal pain, anal bleeding, blood in stool, constipation, diarrhea, nausea, rectal pain and vomiting.   Endocrine: Negative for cold intolerance, heat intolerance, polydipsia, polyphagia and polyuria.   Genitourinary: Negative for decreased urine volume, difficulty urinating, dysuria, enuresis, flank pain, frequency, genital sores, hematuria and urgency.   Musculoskeletal: Negative for arthralgias, back  "pain, gait problem, joint swelling, myalgias, neck pain and neck stiffness.   Skin: Negative for color change, pallor, rash and wound.   Allergic/Immunologic: Negative for environmental allergies, food allergies and immunocompromised state.   Neurological: Negative for dizziness, tremors, seizures, syncope, facial asymmetry, speech difficulty, weakness, light-headedness, numbness and headaches.   Hematological: Negative for adenopathy. Does not bruise/bleed easily.   Psychiatric/Behavioral: Negative for agitation, behavioral problems, confusion, decreased concentration, dysphoric mood, hallucinations, self-injury, sleep disturbance and suicidal ideas. The patient is not nervous/anxious and is not hyperactive.    All other systems reviewed and are negative.      The patient's past medical history, past surgical history, family history, and social history have been reviewed at length in the electronic medical record.      Physical Exam:   Temp 97.5 °F (36.4 °C) (Infrared)   Resp 16   Ht 185.4 cm (73\")   Wt 90.7 kg (200 lb)   BMI 26.39 kg/m²   MUSCULOSKELETAL:  Straight leg raising is negative.  Oumar's Sign is negative.  ROM in the low back is normal.  Tenderness in the back to palpation is not observed.  NEUROLOGICAL:  Orientation, memory, attention span, language function, and cognition have been examined and are intact.  Strength is intact in the lower extremities to direct testing.  Muscle tone is normal throughout.  Station and gait are normal.  Sensation is altered to light touch testing globally in his legs.  Deep tendon reflexes are difficult to elicit throughout.  Coordination is intact.       Medical Decision Making    Data Review:   (All imaging studies were personally reviewed unless stated otherwise)  MRIs dated 6/16/2022 of the entire axial spine have been obtained.  The reason given for the study was syringobulbia/syringomyelia.  I do not see any evidence of syringobulbia or syringomyelia.  He has " some diffuse degenerative disc disease and spondylosis in his neck and mid back.  There is no cord compromise.  Lumbar study demonstrates diffuse degenerative disc disease and facet arthropathy.  There is a grade 2 spondylolisthesis of L4 on L5 with generous stenosis.  I suspect that this his previously operated level.    Flexion-extension plain films demonstrate extensive degenerative change with diffuse spondylosis in his lumbar spine.  The grade 2 listhesis of L4 on L5 is stable.  I reviewed these images but the radiologist did raise the specter of a destructive lesion on the left 11th rib.  As such a bone scan was obtained.    Bone scan demonstrates some diffuse uptake in his spine consistent with his arthritis.  There were no findings to suggest metastatic disease.  The radiologist did note some questionable focal uptake in the thyroid gland possibly due to a nodule or calcification    Electrodiagnostic studies demonstrate moderate generalized peripheral neuropathy without evidence of a radiculopathy.    Diagnosis:   1.  Peripheral neuropathy.  2.  Chronic mechanical low back pain.  3.  Stable L4-5 spondylolisthesis.    Treatment Options:   I have started the patient on gabapentin and escalating doses.  Hopefully this will help with some of the dysesthetic symptoms in his legs.  He does not require surgical intervention.  I have asked him to discuss the bone scan findings related to his thyroid with his primary care physician.  He will follow-up with neurosurgery on an as-needed basis.       Diagnosis Plan   1. Mechanical back pain     2. Foot pain, bilateral  Ambulatory Referral to Podiatry   3. Hereditary and idiopathic peripheral neuropathy         Scribed for Madhav Acosta MD by Manuela Gaytan CMA on 9/13/2022 13:29 EDT       I, Dr. Acosta, personally performed the services described in the documentation, as scribed in my presence, and it is both accurate and complete.

## 2022-10-17 ENCOUNTER — OFFICE VISIT (OUTPATIENT)
Dept: INTERNAL MEDICINE | Facility: CLINIC | Age: 77
End: 2022-10-17

## 2022-10-17 VITALS
TEMPERATURE: 98.2 F | BODY MASS INDEX: 28.1 KG/M2 | OXYGEN SATURATION: 97 % | WEIGHT: 212 LBS | DIASTOLIC BLOOD PRESSURE: 78 MMHG | HEART RATE: 104 BPM | HEIGHT: 73 IN | RESPIRATION RATE: 16 BRPM | SYSTOLIC BLOOD PRESSURE: 152 MMHG

## 2022-10-17 DIAGNOSIS — R53.1 WEAKNESS: ICD-10-CM

## 2022-10-17 DIAGNOSIS — R26.89 POOR BALANCE: ICD-10-CM

## 2022-10-17 DIAGNOSIS — E53.1 VITAMIN B6 DEFICIENCY: ICD-10-CM

## 2022-10-17 DIAGNOSIS — M21.969 LOSS OF PROTECTIVE SENSATION OF SKIN OF DEFORMED FOOT: ICD-10-CM

## 2022-10-17 DIAGNOSIS — R20.8 LOSS OF PROTECTIVE SENSATION OF SKIN OF DEFORMED FOOT: ICD-10-CM

## 2022-10-17 DIAGNOSIS — E11.42 TYPE 2 DIABETES, CONTROLLED, WITH PERIPHERAL NEUROPATHY: Primary | ICD-10-CM

## 2022-10-17 PROCEDURE — 99214 OFFICE O/P EST MOD 30 MIN: CPT | Performed by: INTERNAL MEDICINE

## 2022-10-17 NOTE — PROGRESS NOTES
Subjective     Patient ID: Fazal Berg III is a 77 y.o. male. Patient is here for management of multiple medical problems.     Chief Complaint   Patient presents with   • Diabetes     History of Present Illness     neuropathy dx from foot doctor.           The following portions of the patient's history were reviewed and updated as appropriate: allergies, current medications, past family history, past medical history, past social history, past surgical history and problem list.    Review of Systems   Constitutional: Negative for diaphoresis and fatigue.   Psychiatric/Behavioral: Negative for self-injury and sleep disturbance. The patient is not nervous/anxious.    All other systems reviewed and are negative.      Current Outpatient Medications:   •  Accu-Chek Alondra Plus test strip, TEST THREE TIMES DAILY  AS  INSTRUCTED, Disp: 300 each, Rfl: 3  •  ACCU-CHEK SOFTCLIX LANCETS lancets, 1 each by Other route 3 (Three) Times a Day. Use as instructed, Disp: 300 each, Rfl: 3  •  albuterol sulfate  (90 Base) MCG/ACT inhaler, Inhale 2 puffs Every 4 (Four) Hours As Needed for Wheezing or Shortness of Air., Disp: 8 g, Rfl: 2  •  aspirin 81 MG EC tablet, Take 81 mg by mouth Daily., Disp: , Rfl:   •  bisacodyl (bisacodyl) 5 MG EC tablet, Take 1 tablet by mouth Daily., Disp: 4 tablet, Rfl: 0  •  Blood Gluc Meter Disp-Strips device, 1 strip 3 (Three) Times a Day. Patient needs Accu-chek meter and test strips., Disp: 1 each, Rfl: 0  •  Blood Glucose Calibration (SURECHEK CONTROL SOLUTION) NORMAL liquid, 1 bottle by In Vitro route every 30 (thirty) days., Disp: 3 each, Rfl: 3  •  Blood Glucose Monitoring Suppl (Accu-Chek Alondra Plus) w/Device kit, for testing sugar daily, Disp: 1 kit, Rfl: 0  •  Cholecalciferol (VITAMIN D) 2000 UNITS tablet, Take 1 capsule by mouth 3 (Three) Times a Week., Disp: , Rfl:   •  Chromium-Cinnamon 100-500 MCG-MG capsule, Take 2 capsules by mouth 2 (two) times a day., Disp: , Rfl:   •   "gabapentin (NEURONTIN) 300 MG capsule, Take 1 capsule by mouth Take As Directed. 1 nightly for 3 days, 1 twice a day for 3 days, 1 three times a day thereafter, Disp: 90 capsule, Rfl: 1  •  glimepiride (AMARYL) 4 MG tablet, Take 1 tablet by mouth Daily., Disp: 90 tablet, Rfl: 3  •  Januvia 100 MG tablet, TAKE 1 TABLET EVERY DAY, Disp: 90 tablet, Rfl: 3  •  losartan-hydrochlorothiazide (HYZAAR) 50-12.5 MG per tablet, Take 1 tablet by mouth Daily., Disp: 90 tablet, Rfl: 3  •  metFORMIN (GLUCOPHAGE) 500 MG tablet, Take 1 tablet by mouth 2 (Two) Times a Day With Meals. (Patient taking differently: Take 1 tablet by mouth 2 (Two) Times a Day With Meals. \"Take it once\"), Disp: 180 tablet, Rfl: 3    Objective      Blood pressure 152/78, pulse 104, temperature 98.2 °F (36.8 °C), resp. rate 16, height 185.4 cm (73\"), weight 96.2 kg (212 lb), SpO2 97 %.    Physical Exam     General Appearance:    Alert, cooperative, no distress, appears stated age   Head:    Normocephalic, without obvious abnormality, atraumatic   Eyes:    PERRL, conjunctiva/corneas clear, EOM's intact   Ears:    Normal TM's and external ear canals, both ears   Nose:   Nares normal, septum midline, mucosa normal, no drainage   or sinus tenderness   Throat:   Lips, mucosa, and tongue normal; teeth and gums normal   Neck:   Supple, symmetrical, trachea midline, no adenopathy;        thyroid:  No enlargement/tenderness/nodules; no carotid    bruit or JVD   Back:     Symmetric, no curvature, ROM normal, no CVA tenderness   Lungs:     Clear to auscultation bilaterally, respirations unlabored   Chest wall:    No tenderness or deformity   Heart:    Regular rate and rhythm, S1 and S2 normal, no murmur,        rub or gallop   Abdomen:     Soft, non-tender, bowel sounds active all four quadrants,     no masses, no organomegaly   Extremities:   B/l foot deformities.  Good pulses.        Pulses:   2+ and symmetric all extremities   Skin:   Skin color, texture, turgor " normal, no rashes or lesions   Lymph nodes:   Cervical, supraclavicular, and axillary nodes normal   Neurologic:   CNII-XII intact. Normal strength, sensation and reflexes       throughout      Results for orders placed or performed in visit on 08/29/22   Lipid Panel    Specimen: Blood   Result Value Ref Range    Total Cholesterol 170 0 - 200 mg/dL    Triglycerides 230 (H) 0 - 150 mg/dL    HDL Cholesterol 41 40 - 60 mg/dL    VLDL Cholesterol Slim 39 5 - 40 mg/dL    LDL Chol Calc (NIH) 90 0 - 100 mg/dL   Vitamin B12    Specimen: Blood   Result Value Ref Range    Vitamin B-12 417 211 - 946 pg/mL   Comprehensive Metabolic Panel    Specimen: Blood   Result Value Ref Range    Glucose 151 (H) 65 - 99 mg/dL    BUN 18 8 - 23 mg/dL    Creatinine 1.28 (H) 0.76 - 1.27 mg/dL    EGFR Result 58.4 (L) >60.0 mL/min/1.73    BUN/Creatinine Ratio 14.1 7.0 - 25.0    Sodium 141 136 - 145 mmol/L    Potassium 4.4 3.5 - 5.2 mmol/L    Chloride 102 98 - 107 mmol/L    Total CO2 28.3 22.0 - 29.0 mmol/L    Calcium 9.8 8.6 - 10.5 mg/dL    Total Protein 6.1 6.0 - 8.5 g/dL    Albumin 4.20 3.50 - 5.20 g/dL    Globulin 1.9 gm/dL    A/G Ratio 2.2 g/dL    Total Bilirubin 0.4 0.0 - 1.2 mg/dL    Alkaline Phosphatase 71 39 - 117 U/L    AST (SGOT) 17 1 - 40 U/L    ALT (SGPT) 16 1 - 41 U/L   TSH    Specimen: Blood   Result Value Ref Range    TSH 1.020 0.270 - 4.200 uIU/mL   T4, Free    Specimen: Blood   Result Value Ref Range    Free T4 1.07 0.93 - 1.70 ng/dL   Hemoglobin A1c    Specimen: Blood   Result Value Ref Range    Hemoglobin A1C 8.60 (H) 4.80 - 5.60 %   CBC & Differential    Specimen: Blood   Result Value Ref Range    WBC 5.70 3.40 - 10.80 10*3/mm3    RBC 4.06 (L) 4.14 - 5.80 10*6/mm3    Hemoglobin 12.9 (L) 13.0 - 17.7 g/dL    Hematocrit 38.0 37.5 - 51.0 %    MCV 93.6 79.0 - 97.0 fL    MCH 31.8 26.6 - 33.0 pg    MCHC 33.9 31.5 - 35.7 g/dL    RDW 12.5 12.3 - 15.4 %    Platelets 235 140 - 450 10*3/mm3    Neutrophil Rel % 48.1 42.7 - 76.0 %     Lymphocyte Rel % 29.5 19.6 - 45.3 %    Monocyte Rel % 9.6 5.0 - 12.0 %    Eosinophil Rel % 10.7 (H) 0.3 - 6.2 %    Basophil Rel % 1.6 (H) 0.0 - 1.5 %    Neutrophils Absolute 2.74 1.70 - 7.00 10*3/mm3    Lymphocytes Absolute 1.68 0.70 - 3.10 10*3/mm3    Monocytes Absolute 0.55 0.10 - 0.90 10*3/mm3    Eosinophils Absolute 0.61 (H) 0.00 - 0.40 10*3/mm3    Basophils Absolute 0.09 0.00 - 0.20 10*3/mm3    Immature Granulocyte Rel % 0.5 0.0 - 0.5 %    Immature Grans Absolute 0.03 0.00 - 0.05 10*3/mm3    nRBC 0.0 0.0 - 0.2 /100 WBC         Assessment & Plan   Neuropathy in feet to thighs.  Not in hands.      Pt qualifies for diabetic shoes.    Will fill out paper work.     Not on b cmplex.   Low b6 x 2 in the past.      Concern for other cause for Neuropathy limited to legs only.     Diagnoses and all orders for this visit:    1. Type 2 diabetes, controlled, with peripheral neuropathy (HCC) (Primary)  -     Vitamin B6  -     CK  -     Myoglobin, Serum    2. Poor balance  -     Vitamin B6  -     CK  -     Myoglobin, Serum    3. Loss of protective sensation of skin of deformed foot  -     Vitamin B6    4. Vitamin B6 deficiency  -     Vitamin B6    5. Weakness  -     CK  -     Myoglobin, Serum      Return in about 6 weeks (around 11/28/2022).          There are no Patient Instructions on file for this visit.     Matthew Carr MD    Assessment & Plan

## 2022-10-21 ENCOUNTER — TELEPHONE (OUTPATIENT)
Dept: INTERNAL MEDICINE | Facility: CLINIC | Age: 77
End: 2022-10-21

## 2022-10-21 NOTE — TELEPHONE ENCOUNTER
Caller: Krystyna Berg    Relationship: Emergency Contact    Best call back number:  [phone]      If a prescription is needed, what is your preferred pharmacy and phone number: [id number] - [place], [place] - [address] - [phone]  - [phone] FX     Additional notes:    PATIENT WAS IN OFFICE ON 10/17 AND HE THOUGHT DR KHAN WAS CALLING IN MEDICATION.  WIFE WENT TO PHARMACY AND NOTHING IS THERE.  PATIENT IS UNABLE TO REMEMBER WHAT MEDICATION IT IS.

## 2022-10-24 NOTE — TELEPHONE ENCOUNTER
KENTRELL left informing Fazal that Dr. Carr does not recall medication that was suppose to be sent. Dr. Carr will talk with patient at upcoming appointment about this.

## 2022-10-26 RX ORDER — MULTIVITAMIN WITH IRON
1 TABLET ORAL DAILY
Qty: 90 TABLET | Refills: 3 | Status: SHIPPED | OUTPATIENT
Start: 2022-10-26 | End: 2023-10-26

## 2022-10-26 NOTE — TELEPHONE ENCOUNTER
Patients wife informed.   Patients wife wants Dr. Carr to know that she mentioned taking a trip with Fazal in May and he made the comment that he may not be around to see May due to his leg pain that is getting worse.

## 2022-11-14 DIAGNOSIS — E11.42 TYPE 2 DIABETES, CONTROLLED, WITH PERIPHERAL NEUROPATHY: ICD-10-CM

## 2022-11-23 ENCOUNTER — TELEPHONE (OUTPATIENT)
Dept: INTERNAL MEDICINE | Facility: CLINIC | Age: 77
End: 2022-11-23

## 2022-11-23 DIAGNOSIS — E11.42 TYPE 2 DIABETES, CONTROLLED, WITH PERIPHERAL NEUROPATHY: ICD-10-CM

## 2022-11-23 NOTE — TELEPHONE ENCOUNTER
Pharmacy Name: [facility] - [facility] [address] - [phone]  - [phone]      Pharmacy representative name: RADHA   Pharmacy representative phone number: [phone]    What medication are you calling in regards to: METFORMIN     What question does the pharmacy have: STATES THAT THE DOSING INSTRUCTIONS HAS CONFLICTING INFORMATION AND IS REQUESTING A CALL BACK WITH CLARIFICATION OF WHETHER THE PATIENT TAKES THE MEDICATION ONCE OR TWICE PER DAY   Who is the provider that prescribed the medication: DR KHAN   Additional notes: PLEASE CALL AND ADVISE

## 2022-11-29 ENCOUNTER — TELEPHONE (OUTPATIENT)
Dept: INTERNAL MEDICINE | Facility: CLINIC | Age: 77
End: 2022-11-29

## 2022-11-29 DIAGNOSIS — E11.42 TYPE 2 DIABETES, CONTROLLED, WITH PERIPHERAL NEUROPATHY: ICD-10-CM

## 2022-11-29 NOTE — TELEPHONE ENCOUNTER
Caller: Krystyna Berg    Relationship: Emergency Contact    Best call back number:   [phone]   Requested Prescriptions:   Requested Prescriptions      No prescriptions requested or ordered in this encounter    metFORMIN (GLUCOPHAGE) 500 MG tablet    Pharmacy where request should be sent:  [facility]     Additional details provided by patient: KRYSTYNA IS REQUESTING 7 TO 10 DAYS OF THE MEDICATION BECAUSE THE PATIENT TOOK HIS LAST TABLET LAST NIGHT   AND IT WILL TAKE SOME TIME BEFORE HE GETS THE MEDICATION FROM [facility]   Does the patient have less than a 3 day supply:  [x] Yes  [] No    Would you like a call back once the refill request has been completed:   [x] Yes [] No    If the office needs to give you a call back, can they leave a voicemail:   [x] Yes [] No

## 2022-12-01 ENCOUNTER — LAB (OUTPATIENT)
Dept: LAB | Facility: HOSPITAL | Age: 77
End: 2022-12-01

## 2022-12-01 ENCOUNTER — TRANSCRIBE ORDERS (OUTPATIENT)
Dept: LAB | Facility: HOSPITAL | Age: 77
End: 2022-12-01

## 2022-12-01 DIAGNOSIS — Z01.818 PRE-OPERATIVE CLEARANCE: ICD-10-CM

## 2022-12-01 DIAGNOSIS — Z01.818 PRE-OPERATIVE CLEARANCE: Primary | ICD-10-CM

## 2022-12-01 LAB — HBA1C MFR BLD: 7.6 % (ref 4.8–5.6)

## 2022-12-01 PROCEDURE — 83036 HEMOGLOBIN GLYCOSYLATED A1C: CPT

## 2022-12-01 PROCEDURE — 82985 ASSAY OF GLYCATED PROTEIN: CPT

## 2022-12-01 PROCEDURE — 36415 COLL VENOUS BLD VENIPUNCTURE: CPT

## 2022-12-02 LAB — FRUCTOSAMINE SERPL-SCNC: 282 UMOL/L (ref 0–285)

## 2022-12-05 ENCOUNTER — TELEPHONE (OUTPATIENT)
Dept: INTERNAL MEDICINE | Facility: CLINIC | Age: 77
End: 2022-12-05

## 2022-12-05 NOTE — TELEPHONE ENCOUNTER
Pt's wife states that she has had paperwork for diabetic shoes faxed to office twice and has also dropped off paperwork and is requesting those be completed so that patient can proceed with getting his shoes. Phone number verified.

## 2022-12-05 NOTE — TELEPHONE ENCOUNTER
Called patient and informed to keep the office visit for 12/07/2022 to do a diabetic foot exam and document it in the note and then the shoe paperwork will be sent over.

## 2022-12-07 ENCOUNTER — OFFICE VISIT (OUTPATIENT)
Dept: INTERNAL MEDICINE | Facility: CLINIC | Age: 77
End: 2022-12-07

## 2022-12-07 VITALS
TEMPERATURE: 98.2 F | HEART RATE: 57 BPM | RESPIRATION RATE: 16 BRPM | DIASTOLIC BLOOD PRESSURE: 60 MMHG | SYSTOLIC BLOOD PRESSURE: 118 MMHG | HEIGHT: 73 IN | BODY MASS INDEX: 27.04 KG/M2 | OXYGEN SATURATION: 97 % | WEIGHT: 204 LBS

## 2022-12-07 DIAGNOSIS — R79.9 ABNORMAL FINDING OF BLOOD CHEMISTRY, UNSPECIFIED: ICD-10-CM

## 2022-12-07 DIAGNOSIS — N18.2 CHRONIC RENAL IMPAIRMENT, STAGE 2 (MILD): ICD-10-CM

## 2022-12-07 DIAGNOSIS — M19.90 ARTHRITIS: ICD-10-CM

## 2022-12-07 DIAGNOSIS — I10 HYPERTENSION, UNSPECIFIED TYPE: ICD-10-CM

## 2022-12-07 DIAGNOSIS — E11.42 TYPE 2 DIABETES, CONTROLLED, WITH PERIPHERAL NEUROPATHY: Primary | ICD-10-CM

## 2022-12-07 PROBLEM — M25.569 ARTHRALGIA OF KNEE: Status: ACTIVE | Noted: 2022-11-28

## 2022-12-07 PROCEDURE — 99214 OFFICE O/P EST MOD 30 MIN: CPT | Performed by: INTERNAL MEDICINE

## 2022-12-07 NOTE — PROGRESS NOTES
Subjective     Patient ID: Fazal Berg III is a 77 y.o. male. Patient is here for management of multiple medical problems.     Chief Complaint   Patient presents with   • Diabetes     History of Present Illness   DM    Recently had flu.      The following portions of the patient's history were reviewed and updated as appropriate: allergies, current medications, past family history, past medical history, past social history, past surgical history and problem list.    Review of Systems    Current Outpatient Medications:   •  Accu-Chek Alondra Plus test strip, TEST THREE TIMES DAILY  AS  INSTRUCTED, Disp: 300 each, Rfl: 3  •  ACCU-CHEK SOFTCLIX LANCETS lancets, 1 each by Other route 3 (Three) Times a Day. Use as instructed, Disp: 300 each, Rfl: 3  •  albuterol sulfate  (90 Base) MCG/ACT inhaler, Inhale 2 puffs Every 4 (Four) Hours As Needed for Wheezing or Shortness of Air., Disp: 8 g, Rfl: 2  •  aspirin 81 MG EC tablet, Take 81 mg by mouth Daily., Disp: , Rfl:   •  B Complex-C (B-complex with vitamin C) tablet, Take 1 tablet by mouth Daily., Disp: 90 tablet, Rfl: 3  •  bisacodyl (bisacodyl) 5 MG EC tablet, Take 1 tablet by mouth Daily., Disp: 4 tablet, Rfl: 0  •  Blood Gluc Meter Disp-Strips device, 1 strip 3 (Three) Times a Day. Patient needs Accu-chek meter and test strips., Disp: 1 each, Rfl: 0  •  Blood Glucose Calibration (SURECHEK CONTROL SOLUTION) NORMAL liquid, 1 bottle by In Vitro route every 30 (thirty) days., Disp: 3 each, Rfl: 3  •  Blood Glucose Monitoring Suppl (Accu-Chek Alondra Plus) w/Device kit, for testing sugar daily, Disp: 1 kit, Rfl: 0  •  Cholecalciferol (VITAMIN D) 2000 UNITS tablet, Take 1 capsule by mouth 3 (Three) Times a Week., Disp: , Rfl:   •  Chromium-Cinnamon 100-500 MCG-MG capsule, Take 2 capsules by mouth 2 (two) times a day., Disp: , Rfl:   •  gabapentin (NEURONTIN) 300 MG capsule, Take 1 capsule by mouth Take As Directed. 1 nightly for 3 days, 1 twice a day for 3 days, 1  "three times a day thereafter, Disp: 90 capsule, Rfl: 1  •  glimepiride (AMARYL) 4 MG tablet, Take 1 tablet by mouth Daily., Disp: 90 tablet, Rfl: 3  •  Januvia 100 MG tablet, TAKE 1 TABLET EVERY DAY, Disp: 90 tablet, Rfl: 3  •  losartan-hydrochlorothiazide (HYZAAR) 50-12.5 MG per tablet, Take 1 tablet by mouth Daily., Disp: 90 tablet, Rfl: 3  •  metFORMIN (GLUCOPHAGE) 500 MG tablet, Take 1 tablet by mouth 2 (Two) Times a Day With Meals., Disp: 15 tablet, Rfl: 0    Objective      Blood pressure 118/60, pulse 57, temperature 98.2 °F (36.8 °C), resp. rate 16, height 185.4 cm (73\"), weight 92.5 kg (204 lb), SpO2 97 %.    Physical Exam     General Appearance:    Alert, cooperative, no distress, appears stated age   Head:    Normocephalic, without obvious abnormality, atraumatic   Eyes:    PERRL, conjunctiva/corneas clear, EOM's intact   Ears:    Normal TM's and external ear canals, both ears   Nose:   Nares normal, septum midline, mucosa normal, no drainage   or sinus tenderness   Throat:   Lips, mucosa, and tongue normal; teeth and gums normal   Neck:   Supple, symmetrical, trachea midline, no adenopathy;        thyroid:  No enlargement/tenderness/nodules; no carotid    bruit or JVD   Back:     Symmetric, no curvature, ROM normal, no CVA tenderness   Lungs:     Clear to auscultation bilaterally, respirations unlabored   Chest wall:    No tenderness or deformity   Heart:    Regular rate and rhythm, S1 and S2 normal, no murmur,        rub or gallop   Abdomen:     Soft, non-tender, bowel sounds active all four quadrants,     no masses, no organomegaly   Extremities:   Extremities normal, atraumatic, no cyanosis or edema   Pulses:   2+ and symmetric all extremities   Skin:   Skin color, texture, turgor normal, no rashes or lesions   Lymph nodes:   Cervical, supraclavicular, and axillary nodes normal   Neurologic:   CNII-XII intact. Normal strength, sensation and reflexes       throughout      Results for orders placed or " performed in visit on 12/01/22   Fructosamine    Specimen: Blood   Result Value Ref Range    Fructosamine 282 0 - 285 umol/L   Hemoglobin A1c    Specimen: Blood   Result Value Ref Range    Hemoglobin A1C 7.60 (H) 4.80 - 5.60 %         Assessment & Plan     Increased episodes getting up at night to urinate. Urolith.. pt without edema.  Will watch for edema by night.      Hemoglobin A1C   4.80 - 5.60 % 7.60 High   8.60 High  CM  7.4 High  R, CM  6.80 High  CM  7.30 High  CM  7.50 High  CM  7.10 High        Vit b6 level pending. On for low b6.  No better no worse.    On gabapentin. Come off to see if better. Got worse.   Now just taking BID.   Pt wanting me to take over the rx.   Pt will try to minimize the meds.    Consider adding b6 ot complex is b6 still low.          Diagnoses and all orders for this visit:    1. Type 2 diabetes, controlled, with peripheral neuropathy (HCC) (Primary)  -     TSH  -     Comprehensive Metabolic Panel  -     Vitamin B12  -     CBC & Differential  -     Lipid Panel  -     Hemoglobin A1c  -     MicroAlbumin, Urine, Random - Urine, Clean Catch    2. Hypertension, unspecified type  -     TSH  -     Comprehensive Metabolic Panel  -     Vitamin B12  -     CBC & Differential  -     Lipid Panel  -     Hemoglobin A1c  -     MicroAlbumin, Urine, Random - Urine, Clean Catch      Return in about 6 months (around 6/7/2023).          There are no Patient Instructions on file for this visit.     Matthew Carr MD    Assessment & Plan       Answers for HPI/ROS submitted by the patient on 12/6/2022  What is the primary reason for your visit?: Other  Please describe your symptoms.: Leg pain and numbness, going all the way up to bellybutton, too frequent urination, diarrhea  Have you had these symptoms before?: Yes  How long have you been having these symptoms?: Greater than 2 weeks  Please list any medications you are currently taking for this condition.: None

## 2022-12-08 LAB
CK SERPL-CCNC: 58 U/L (ref 20–200)
MYOGLOBIN SERPL-MCNC: 85.5 NG/ML (ref 28–72)
PYRIDOXAL PHOS SERPL-MCNC: 14.9 UG/L (ref 3.4–65.2)

## 2022-12-09 ENCOUNTER — TELEPHONE (OUTPATIENT)
Dept: INTERNAL MEDICINE | Facility: CLINIC | Age: 77
End: 2022-12-09

## 2022-12-09 LAB
ALBUMIN SERPL-MCNC: 4.4 G/DL (ref 3.5–5.2)
ALBUMIN/GLOB SERPL: 1.9 G/DL
ALP SERPL-CCNC: 66 U/L (ref 39–117)
ALT SERPL-CCNC: 27 U/L (ref 1–41)
AST SERPL-CCNC: 20 U/L (ref 1–40)
BASOPHILS # BLD AUTO: 0.05 10*3/MM3 (ref 0–0.2)
BASOPHILS NFR BLD AUTO: 1.2 % (ref 0–1.5)
BILIRUB SERPL-MCNC: 0.5 MG/DL (ref 0–1.2)
BUN SERPL-MCNC: 33 MG/DL (ref 8–23)
BUN/CREAT SERPL: 22.1 (ref 7–25)
CALCIUM SERPL-MCNC: 10 MG/DL (ref 8.6–10.5)
CHLORIDE SERPL-SCNC: 101 MMOL/L (ref 98–107)
CHOLEST SERPL-MCNC: 146 MG/DL (ref 0–200)
CO2 SERPL-SCNC: 27.1 MMOL/L (ref 22–29)
CREAT SERPL-MCNC: 1.49 MG/DL (ref 0.76–1.27)
EGFRCR SERPLBLD CKD-EPI 2021: 48.6 ML/MIN/1.73
EOSINOPHIL # BLD AUTO: 0.11 10*3/MM3 (ref 0–0.4)
EOSINOPHIL NFR BLD AUTO: 2.7 % (ref 0.3–6.2)
ERYTHROCYTE [DISTWIDTH] IN BLOOD BY AUTOMATED COUNT: 12.3 % (ref 12.3–15.4)
GLOBULIN SER CALC-MCNC: 2.3 GM/DL
GLUCOSE SERPL-MCNC: 124 MG/DL (ref 65–99)
HBA1C MFR BLD: 8 % (ref 4.8–5.6)
HCT VFR BLD AUTO: 63.7 % (ref 37.5–51)
HDLC SERPL-MCNC: 36 MG/DL (ref 40–60)
HGB BLD-MCNC: 21.1 G/DL (ref 13–17.7)
IMM GRANULOCYTES # BLD AUTO: 0.02 10*3/MM3 (ref 0–0.05)
IMM GRANULOCYTES NFR BLD AUTO: 0.5 % (ref 0–0.5)
LDLC SERPL CALC-MCNC: 84 MG/DL (ref 0–100)
LYMPHOCYTES # BLD AUTO: 2.54 10*3/MM3 (ref 0.7–3.1)
LYMPHOCYTES NFR BLD AUTO: 61.8 % (ref 19.6–45.3)
MCH RBC QN AUTO: 30.9 PG (ref 26.6–33)
MCHC RBC AUTO-ENTMCNC: 33.1 G/DL (ref 31.5–35.7)
MCV RBC AUTO: 93.3 FL (ref 79–97)
MICROALBUMIN UR-MCNC: 18.6 UG/ML
MONOCYTES # BLD AUTO: 0.13 10*3/MM3 (ref 0.1–0.9)
MONOCYTES NFR BLD AUTO: 3.2 % (ref 5–12)
NEUTROPHILS # BLD AUTO: 1.26 10*3/MM3 (ref 1.7–7)
NEUTROPHILS NFR BLD AUTO: 30.6 % (ref 42.7–76)
PLATELET # BLD AUTO: 127 10*3/MM3 (ref 140–450)
POTASSIUM SERPL-SCNC: 4.7 MMOL/L (ref 3.5–5.2)
PROT SERPL-MCNC: 6.7 G/DL (ref 6–8.5)
RBC # BLD AUTO: 6.83 10*6/MM3 (ref 4.14–5.8)
SODIUM SERPL-SCNC: 136 MMOL/L (ref 136–145)
TRIGL SERPL-MCNC: 145 MG/DL (ref 0–150)
TSH SERPL DL<=0.005 MIU/L-ACNC: 1.28 UIU/ML (ref 0.27–4.2)
VIT B12 SERPL-MCNC: 1423 PG/ML (ref 211–946)
VLDLC SERPL CALC-MCNC: 26 MG/DL (ref 5–40)
WBC # BLD AUTO: 4.11 10*3/MM3 (ref 3.4–10.8)
WRITTEN AUTHORIZATION: NORMAL

## 2022-12-09 NOTE — TELEPHONE ENCOUNTER
Caller: JUAN WITH [facility].    Relationship:     Best call back number: [phone]    Who are you requesting to speak with (clinical staff, provider,  specific staff member): DR. KHAN OR NURSE    What was the call regarding: STATED THAT PT A1C WAS  7.6 ON 12/1 AND WAS RECHECKED ON 12/7 WAS 8, PT HAS KNEE REPLACEMENT ON 12/27 AND IS CONCERNED WITH PT A1C

## 2022-12-13 ENCOUNTER — OFFICE VISIT (OUTPATIENT)
Dept: INTERNAL MEDICINE | Facility: CLINIC | Age: 77
End: 2022-12-13

## 2022-12-13 ENCOUNTER — APPOINTMENT (OUTPATIENT)
Dept: PREADMISSION TESTING | Facility: HOSPITAL | Age: 77
End: 2022-12-13

## 2022-12-13 VITALS
BODY MASS INDEX: 26.77 KG/M2 | RESPIRATION RATE: 16 BRPM | DIASTOLIC BLOOD PRESSURE: 68 MMHG | OXYGEN SATURATION: 98 % | SYSTOLIC BLOOD PRESSURE: 110 MMHG | WEIGHT: 202 LBS | HEIGHT: 73 IN | TEMPERATURE: 98 F | HEART RATE: 66 BPM

## 2022-12-13 DIAGNOSIS — E11.42 TYPE 2 DIABETES, CONTROLLED, WITH PERIPHERAL NEUROPATHY: ICD-10-CM

## 2022-12-13 DIAGNOSIS — D72.820 LYMPHOCYTOSIS: ICD-10-CM

## 2022-12-13 DIAGNOSIS — D75.1 ERYTHROCYTOSIS: ICD-10-CM

## 2022-12-13 DIAGNOSIS — N18.31 CHRONIC RENAL IMPAIRMENT, STAGE 3A: Primary | ICD-10-CM

## 2022-12-13 DIAGNOSIS — D75.839 THROMBOCYTOSIS: ICD-10-CM

## 2022-12-13 DIAGNOSIS — E11.65 TYPE 2 DIABETES MELLITUS WITH HYPERGLYCEMIA, WITHOUT LONG-TERM CURRENT USE OF INSULIN: ICD-10-CM

## 2022-12-13 PROCEDURE — 99214 OFFICE O/P EST MOD 30 MIN: CPT | Performed by: INTERNAL MEDICINE

## 2022-12-13 NOTE — PROGRESS NOTES
Subjective     Patient ID: Fazal Berg III is a 77 y.o. male. Patient is here for management of multiple medical problems.     Chief Complaint   Patient presents with   • Diabetes     History of Present Illness   DM better. running 60-70 at times.    DR Bill for ortho.    recenet flu.       The following portions of the patient's history were reviewed and updated as appropriate: allergies, current medications, past family history, past medical history, past social history, past surgical history and problem list.    Review of Systems    Current Outpatient Medications:   •  Accu-Chek Alondra Plus test strip, TEST THREE TIMES DAILY  AS  INSTRUCTED, Disp: 300 each, Rfl: 3  •  ACCU-CHEK SOFTCLIX LANCETS lancets, 1 each by Other route 3 (Three) Times a Day. Use as instructed, Disp: 300 each, Rfl: 3  •  albuterol sulfate  (90 Base) MCG/ACT inhaler, Inhale 2 puffs Every 4 (Four) Hours As Needed for Wheezing or Shortness of Air., Disp: 8 g, Rfl: 2  •  aspirin 81 MG EC tablet, Take 81 mg by mouth Daily., Disp: , Rfl:   •  B Complex-C (B-complex with vitamin C) tablet, Take 1 tablet by mouth Daily., Disp: 90 tablet, Rfl: 3  •  bisacodyl (bisacodyl) 5 MG EC tablet, Take 1 tablet by mouth Daily., Disp: 4 tablet, Rfl: 0  •  Blood Gluc Meter Disp-Strips device, 1 strip 3 (Three) Times a Day. Patient needs Accu-chek meter and test strips., Disp: 1 each, Rfl: 0  •  Blood Glucose Calibration (SURECHEK CONTROL SOLUTION) NORMAL liquid, 1 bottle by In Vitro route every 30 (thirty) days., Disp: 3 each, Rfl: 3  •  Blood Glucose Monitoring Suppl (Accu-Chek Alondra Plus) w/Device kit, for testing sugar daily, Disp: 1 kit, Rfl: 0  •  Cholecalciferol (VITAMIN D) 2000 UNITS tablet, Take 1 capsule by mouth 3 (Three) Times a Week., Disp: , Rfl:   •  Chromium-Cinnamon 100-500 MCG-MG capsule, Take 2 capsules by mouth 2 (two) times a day., Disp: , Rfl:   •  gabapentin (NEURONTIN) 300 MG capsule, Take 1 capsule by mouth Take As Directed.  "1 nightly for 3 days, 1 twice a day for 3 days, 1 three times a day thereafter, Disp: 90 capsule, Rfl: 1  •  glimepiride (AMARYL) 4 MG tablet, Take 1 tablet by mouth Daily., Disp: 90 tablet, Rfl: 3  •  Januvia 100 MG tablet, TAKE 1 TABLET EVERY DAY, Disp: 90 tablet, Rfl: 3  •  losartan-hydrochlorothiazide (HYZAAR) 50-12.5 MG per tablet, Take 1 tablet by mouth Daily., Disp: 90 tablet, Rfl: 3  •  metFORMIN (GLUCOPHAGE) 500 MG tablet, Take 1 tablet by mouth 2 (Two) Times a Day With Meals., Disp: 15 tablet, Rfl: 0    Objective      Blood pressure 110/68, pulse 66, temperature 98 °F (36.7 °C), resp. rate 16, height 185.4 cm (73\"), weight 91.6 kg (202 lb), SpO2 98 %.    Physical Exam     General Appearance:    Alert, cooperative, no distress, appears stated age   Head:    Normocephalic, without obvious abnormality, atraumatic   Eyes:    PERRL, conjunctiva/corneas clear, EOM's intact   Ears:    Normal TM's and external ear canals, both ears   Nose:   Nares normal, septum midline, mucosa normal, no drainage   or sinus tenderness   Throat:   Lips, mucosa, and tongue normal; teeth and gums normal   Neck:   Supple, symmetrical, trachea midline, no adenopathy;        thyroid:  No enlargement/tenderness/nodules; no carotid    bruit or JVD   Back:     Symmetric, no curvature, ROM normal, no CVA tenderness   Lungs:     Clear to auscultation bilaterally, respirations unlabored   Chest wall:    No tenderness or deformity   Heart:    Regular rate and rhythm, S1 and S2 normal, no murmur,        rub or gallop   Abdomen:     Soft, non-tender, bowel sounds active all four quadrants,     no masses, no organomegaly   Extremities:   Extremities normal, atraumatic, no cyanosis or edema   Pulses:   2+ and symmetric all extremities   Skin:   Skin color, texture, turgor normal, no rashes or lesions   Lymph nodes:   Cervical, supraclavicular, and axillary nodes normal   Neurologic:   CNII-XII intact. Normal strength, sensation and reflexes       " throughout      Results for orders placed or performed in visit on 12/07/22   TSH    Specimen: Blood   Result Value Ref Range    TSH 1.280 0.270 - 4.200 uIU/mL   Comprehensive Metabolic Panel    Specimen: Blood   Result Value Ref Range    Glucose 124 (H) 65 - 99 mg/dL    BUN 33 (H) 8 - 23 mg/dL    Creatinine 1.49 (H) 0.76 - 1.27 mg/dL    EGFR Result 48.6 (L) >60.0 mL/min/1.73    BUN/Creatinine Ratio 22.1 7.0 - 25.0    Sodium 136 136 - 145 mmol/L    Potassium 4.7 3.5 - 5.2 mmol/L    Chloride 101 98 - 107 mmol/L    Total CO2 27.1 22.0 - 29.0 mmol/L    Calcium 10.0 8.6 - 10.5 mg/dL    Total Protein 6.7 6.0 - 8.5 g/dL    Albumin 4.40 3.50 - 5.20 g/dL    Globulin 2.3 gm/dL    A/G Ratio 1.9 g/dL    Total Bilirubin 0.5 0.0 - 1.2 mg/dL    Alkaline Phosphatase 66 39 - 117 U/L    AST (SGOT) 20 1 - 40 U/L    ALT (SGPT) 27 1 - 41 U/L   Vitamin B12    Specimen: Blood   Result Value Ref Range    Vitamin B-12 1,423 (H) 211 - 946 pg/mL   Lipid Panel    Specimen: Blood   Result Value Ref Range    Total Cholesterol 146 0 - 200 mg/dL    Triglycerides 145 0 - 150 mg/dL    HDL Cholesterol 36 (L) 40 - 60 mg/dL    VLDL Cholesterol Slim 26 5 - 40 mg/dL    LDL Chol Calc (NIH) 84 0 - 100 mg/dL   Hemoglobin A1c    Specimen: Blood   Result Value Ref Range    Hemoglobin A1C 8.00 (H) 4.80 - 5.60 %   MicroAlbumin, Urine, Random - Urine, Clean Catch    Specimen: Urine, Clean Catch   Result Value Ref Range    Microalbumin, Urine 18.6 Not Estab. ug/mL   Written Authorization   Result Value Ref Range    Written Authorization Comment    CBC & Differential    Specimen: Blood   Result Value Ref Range    WBC 4.11 3.40 - 10.80 10*3/mm3    RBC 6.83 (H) 4.14 - 5.80 10*6/mm3    Hemoglobin 21.1 (H) 13.0 - 17.7 g/dL    Hematocrit 63.7 (H) 37.5 - 51.0 %    MCV 93.3 79.0 - 97.0 fL    MCH 30.9 26.6 - 33.0 pg    MCHC 33.1 31.5 - 35.7 g/dL    RDW 12.3 12.3 - 15.4 %    Platelets 127 (L) 140 - 450 10*3/mm3    Neutrophil Rel % 30.6 (L) 42.7 - 76.0 %    Lymphocyte Rel %  61.8 (H) 19.6 - 45.3 %    Monocyte Rel % 3.2 (L) 5.0 - 12.0 %    Eosinophil Rel % 2.7 0.3 - 6.2 %    Basophil Rel % 1.2 0.0 - 1.5 %    Neutrophils Absolute 1.26 (L) 1.70 - 7.00 10*3/mm3    Lymphocytes Absolute 2.54 0.70 - 3.10 10*3/mm3    Monocytes Absolute 0.13 0.10 - 0.90 10*3/mm3    Eosinophils Absolute 0.11 0.00 - 0.40 10*3/mm3    Basophils Absolute 0.05 0.00 - 0.20 10*3/mm3    Immature Granulocyte Rel % 0.5 0.0 - 0.5 %    Immature Grans Absolute 0.02 0.00 - 0.05 10*3/mm3         Assessment & Plan     Recent flu. 2 weeks ago. Labs 6 days ago. Low plt. High lymp. High H/H. Will recheck.  BUn/Cr up as well.    Will recheck labs.        Diagnoses and all orders for this visit:    1. Chronic renal impairment, stage 3a (HCC) (Primary)  -     CBC & Differential  -     Comprehensive Metabolic Panel  -     Hemoglobin A1c    2. Type 2 diabetes mellitus with hyperglycemia, without long-term current use of insulin (HCC)  -     CBC & Differential  -     Comprehensive Metabolic Panel  -     Hemoglobin A1c    3. Erythrocytosis  -     CBC & Differential  -     Comprehensive Metabolic Panel  -     Hemoglobin A1c    4. Lymphocytosis  -     CBC & Differential  -     Comprehensive Metabolic Panel  -     Hemoglobin A1c    5. Thrombocytosis  -     CBC & Differential  -     Comprehensive Metabolic Panel  -     Hemoglobin A1c    6. Type 2 diabetes, controlled, with peripheral neuropathy (HCC)  -     CBC & Differential  -     Comprehensive Metabolic Panel  -     Hemoglobin A1c      Return in about 6 weeks (around 1/24/2023).          There are no Patient Instructions on file for this visit.     Matthew Carr MD    Assessment & Plan

## 2022-12-14 ENCOUNTER — TELEPHONE (OUTPATIENT)
Dept: INTERNAL MEDICINE | Facility: CLINIC | Age: 77
End: 2022-12-14

## 2022-12-14 LAB
ALBUMIN SERPL-MCNC: 4.4 G/DL (ref 3.5–5.2)
ALBUMIN/GLOB SERPL: 2 G/DL
ALP SERPL-CCNC: 63 U/L (ref 39–117)
ALT SERPL-CCNC: 19 U/L (ref 1–41)
AST SERPL-CCNC: 22 U/L (ref 1–40)
BASOPHILS # BLD AUTO: 0.08 10*3/MM3 (ref 0–0.2)
BASOPHILS NFR BLD AUTO: 1.1 % (ref 0–1.5)
BILIRUB SERPL-MCNC: 0.8 MG/DL (ref 0–1.2)
BUN SERPL-MCNC: 26 MG/DL (ref 8–23)
BUN/CREAT SERPL: 20.2 (ref 7–25)
CALCIUM SERPL-MCNC: 10 MG/DL (ref 8.6–10.5)
CHLORIDE SERPL-SCNC: 94 MMOL/L (ref 98–107)
CO2 SERPL-SCNC: 26.9 MMOL/L (ref 22–29)
CREAT SERPL-MCNC: 1.29 MG/DL (ref 0.76–1.27)
EGFRCR SERPLBLD CKD-EPI 2021: 57.8 ML/MIN/1.73
EOSINOPHIL # BLD AUTO: 0.22 10*3/MM3 (ref 0–0.4)
EOSINOPHIL NFR BLD AUTO: 2.9 % (ref 0.3–6.2)
ERYTHROCYTE [DISTWIDTH] IN BLOOD BY AUTOMATED COUNT: 12.5 % (ref 12.3–15.4)
GLOBULIN SER CALC-MCNC: 2.2 GM/DL
GLUCOSE SERPL-MCNC: 58 MG/DL (ref 65–99)
HBA1C MFR BLD: 7.8 % (ref 4.8–5.6)
HCT VFR BLD AUTO: 37.8 % (ref 37.5–51)
HGB BLD-MCNC: 12.5 G/DL (ref 13–17.7)
IMM GRANULOCYTES # BLD AUTO: 0.04 10*3/MM3 (ref 0–0.05)
IMM GRANULOCYTES NFR BLD AUTO: 0.5 % (ref 0–0.5)
LYMPHOCYTES # BLD AUTO: 2.17 10*3/MM3 (ref 0.7–3.1)
LYMPHOCYTES NFR BLD AUTO: 29.1 % (ref 19.6–45.3)
MCH RBC QN AUTO: 30.7 PG (ref 26.6–33)
MCHC RBC AUTO-ENTMCNC: 33.1 G/DL (ref 31.5–35.7)
MCV RBC AUTO: 92.9 FL (ref 79–97)
MONOCYTES # BLD AUTO: 0.87 10*3/MM3 (ref 0.1–0.9)
MONOCYTES NFR BLD AUTO: 11.7 % (ref 5–12)
NEUTROPHILS # BLD AUTO: 4.08 10*3/MM3 (ref 1.7–7)
NEUTROPHILS NFR BLD AUTO: 54.7 % (ref 42.7–76)
NRBC BLD AUTO-RTO: 0 /100 WBC (ref 0–0.2)
PLATELET # BLD AUTO: 325 10*3/MM3 (ref 140–450)
POTASSIUM SERPL-SCNC: 4.3 MMOL/L (ref 3.5–5.2)
PROT SERPL-MCNC: 6.6 G/DL (ref 6–8.5)
RBC # BLD AUTO: 4.07 10*6/MM3 (ref 4.14–5.8)
SODIUM SERPL-SCNC: 131 MMOL/L (ref 136–145)
WBC # BLD AUTO: 7.46 10*3/MM3 (ref 3.4–10.8)

## 2022-12-14 NOTE — TELEPHONE ENCOUNTER
PAULA WITH [facility] CALLED TO VERIFY WHETHER OR NOT PROVIDER RECEIVED FAX THAT WAS FAXED OVER FOR  DRUG [facility]ERAPY [facility] DIABETES [facility] CARDIOVASCULAR, THEY HAVE NOT HEARD BACK FROM ANYONE.    PLEASE ADVISE.cALL BACK: [phone]

## 2022-12-22 ENCOUNTER — TELEPHONE (OUTPATIENT)
Dept: INTERNAL MEDICINE | Facility: CLINIC | Age: 77
End: 2022-12-22

## 2022-12-22 DIAGNOSIS — K62.89 RECTAL MASS: Primary | ICD-10-CM

## 2022-12-22 NOTE — TELEPHONE ENCOUNTER
Caller: [facility]    Relationship:     Best call back number: [phone]      What test was performed: CT OF THE  LEFT LOWER  EXTREMITY    When was the test performed: 12.13.22    [facility]    Additional notes: FAXING CT SCAN DONE 12.13.22 STATES RESULTS CONCERNING THICKENING OF THE RECTUM CONCERNING FOR MALIGNANCY, ENDOSCOPY CORRELATION IS RECCOMENDED

## 2023-01-05 DIAGNOSIS — G60.9 HEREDITARY AND IDIOPATHIC PERIPHERAL NEUROPATHY: ICD-10-CM

## 2023-01-05 RX ORDER — GABAPENTIN 300 MG/1
300 CAPSULE ORAL TAKE AS DIRECTED
Qty: 90 CAPSULE | Refills: 1 | OUTPATIENT
Start: 2023-01-05

## 2023-01-05 NOTE — TELEPHONE ENCOUNTER
Caller: Krystyna Berg    Relationship: Emergency Contact    Best call back number: [phone]    Requested Prescriptions:   Requested Prescriptions     Pending Prescriptions Disp Refills   • gabapentin (NEURONTIN) 300 MG capsule 90 capsule 1     Sig: Take 1 capsule by mouth Take As Directed. 1 nightly for 3 days, 1 twice a day for 3 days, 1 three times a day thereafter        Pharmacy where request should be sent: [id number] [place], [place] - [address]  - [phone] [facility] [phone] FX     Additional details provided by patient:     Does the patient have less than a 3 day supply:  [x] Yes  [] No    SiLoretta Rouse Rep   01/05/23 10:35 EST

## 2023-01-16 ENCOUNTER — OFFICE VISIT (OUTPATIENT)
Dept: INTERNAL MEDICINE | Facility: CLINIC | Age: 78
End: 2023-01-16
Payer: MEDICARE

## 2023-01-16 VITALS
OXYGEN SATURATION: 99 % | HEART RATE: 65 BPM | DIASTOLIC BLOOD PRESSURE: 76 MMHG | RESPIRATION RATE: 16 BRPM | WEIGHT: 192 LBS | BODY MASS INDEX: 25.45 KG/M2 | TEMPERATURE: 96.5 F | SYSTOLIC BLOOD PRESSURE: 120 MMHG | HEIGHT: 73 IN

## 2023-01-16 DIAGNOSIS — E11.42 TYPE 2 DIABETES, CONTROLLED, WITH PERIPHERAL NEUROPATHY: ICD-10-CM

## 2023-01-16 DIAGNOSIS — G60.9 HEREDITARY AND IDIOPATHIC PERIPHERAL NEUROPATHY: ICD-10-CM

## 2023-01-16 DIAGNOSIS — Z12.5 PROSTATE CANCER SCREENING: Primary | ICD-10-CM

## 2023-01-16 PROCEDURE — 99214 OFFICE O/P EST MOD 30 MIN: CPT | Performed by: INTERNAL MEDICINE

## 2023-01-16 RX ORDER — TRAMADOL HYDROCHLORIDE 50 MG/1
TABLET ORAL
COMMUNITY
Start: 2023-01-06

## 2023-01-16 RX ORDER — OXYCODONE HYDROCHLORIDE 5 MG/1
TABLET ORAL
COMMUNITY
Start: 2023-01-06

## 2023-01-16 RX ORDER — GABAPENTIN 300 MG/1
300 CAPSULE ORAL TAKE AS DIRECTED
Qty: 90 CAPSULE | Refills: 1 | Status: SHIPPED | OUTPATIENT
Start: 2023-01-16

## 2023-01-16 NOTE — PROGRESS NOTES
Patient: Fazal Berg III    YOB: 1945    Date: 01/18/2023    Primary Care Provider: Matthew Carr MD    Chief Complaint   Patient presents with   • Rectal Mass     Burning sensation       SUBJECTIVE:  History of present illness:  I saw the patient in the office today as a consultation for evaluation and treatment of a rectal mass .  Patient had a ct scan that showed Questionable asymmetric thickening of the rectum concerning for  malignancy. Endoscopic correlation is recommended.  He states he has known BPH, no improvement with UroLift.  No rectal bleeding or significant change in bowel habits.  Last colonoscopy over 4 years ago.  Patient concerned about findings on CT scan indicating a rectal mass and thickening.      The following portions of the patient's history were reviewed and updated as appropriate: allergies, current medications, past family history, past medical history, past social history, past surgical history and problem list.    Review of Systems   Constitutional: Negative for chills, fever and unexpected weight change.   HENT: Negative for trouble swallowing and voice change.    Eyes: Negative for visual disturbance.   Respiratory: Negative for apnea, cough, chest tightness, shortness of breath and wheezing.    Cardiovascular: Negative for chest pain, palpitations and leg swelling.   Gastrointestinal: Negative for abdominal distention, abdominal pain, anal bleeding, blood in stool, constipation, diarrhea, nausea, rectal pain and vomiting.   Endocrine: Negative for cold intolerance and heat intolerance.   Genitourinary: Negative for difficulty urinating, dysuria, flank pain, scrotal swelling and testicular pain.   Musculoskeletal: Negative for back pain, gait problem and joint swelling.   Skin: Negative for color change, rash and wound.   Neurological: Negative for dizziness, syncope, speech difficulty, weakness, numbness and headaches.   Hematological: Negative for  "adenopathy. Does not bruise/bleed easily.   Psychiatric/Behavioral: Negative for confusion. The patient is not nervous/anxious.        History:  Past Medical History:   Diagnosis Date   • Acute asthma exacerbation    • Acute sinusitis    • Acute URI    • Allergic    • Allergic rhinitis    • Anemia    • Arthritis    • Asthma    • Benign prostatic hypertrophy    • Bronchitis    • Chest pain     \"I'VE SEEN DR. CARRASCO FOR IT A LONG TIME AGO, AND THAT'S WHEN HE DID TESTING\".  STATES HAS NOT MENTIONED IT TO DR. KHAN.  STATES UNSURE OF WHEN LAST TIME WAS\".  PT STATES \"I'M NOT A COMPLAINER\".  STATES COLD WEATHER AND WALKING BRING IT ON.  GETS SHORTNESS OF BREATH OCCASSIONALLY.  MORE DESCRIBED AS PRESSURE.    • ED (erectile dysfunction)    • Elevated cholesterol    • Family history of deafness and hearing loss    • Full dentures     INSTRUCTED NO ADHESIVES THE DOS   • GERD (gastroesophageal reflux disease)    • H/O echocardiogram 2003   • Hiatal hernia    • History of fracture     REPORTS TOE ON RIGHT FOOT - NO SURGICAL INTERVENTION REQUIRED   • [place] (hard of hearing)     SPOUSE REPORTS NO USE OF HEARING AIDS   • Hx of exercise stress test     SPOUSE REPORTS LAST DONE BY DR ALMANZA AROUND JUNE 2018 AND REPORTED ALL WNL'S AND MD RELEASED PATIENT   • Hyperlipidemia    • Hypertension    • Irritable bowel syndrome    • Jaundice     AS A TEEN    • Obstructive sleep apnea     NO CPAP - SPOUSE REPORTS \"HE WOULDN'T DO IT\"   • Peripheral neuropathy    • Pneumonia     SPOUSE REPORTS HISTORY   • Prostate cancer (HCC)    • Short of breath on exertion    • Skin cancer    • Type 2 diabetes mellitus (HCC)    • Urinary frequency    • Vitamin B 12 deficiency    • Vitamin D deficiency    • Wears glasses        Past Surgical History:   Procedure Laterality Date   • APPENDECTOMY     • BACK SURGERY      Dr. Montenegro - 1997?   • CARDIAC CATHETERIZATION  2003?    \"IT WAS OK\"   • CHOLECYSTECTOMY  06/2018   • CHOLECYSTECTOMY WITH INTRAOPERATIVE " CHOLANGIOGRAM N/A 05/15/2018    Procedure: CHOLECYSTECTOMY LAPAROSCOPIC INTRAOPERATIVE CHOLANGIOGRAM;  Surgeon: Eric Bran MD;  Location: [facility] OR;  Service: General   • COLONOSCOPY     • COLONOSCOPY N/A 08/08/2018    Procedure: COLONOSCOPY;  Surgeon: Eric Bran MD;  Location: [facility] ENDOSCOPY;  Service: Gastroenterology   • ENDOSCOPY     • HAND SURGERY Left    • KNEE ARTHROSCOPY Left    • MOUTH SURGERY      FULL MOUTH EXTRACTION   • PROSTATE SURGERY  07/26/2022    UroliMaury coleman   • SKIN CANCER EXCISION  07/22/2019    skin cancer on head   • VASECTOMY  1977       Family History   Problem Relation Age of Onset   • Diabetes Mother    • Arthritis Mother    • Diabetes Father    • Alcohol abuse Father    • Arthritis Other    • Gout Other    • Heart disease Other    • Lung disease Other        Social History     Tobacco Use   • Smoking status: Never   • Smokeless tobacco: Never   Substance Use Topics   • Alcohol use: No   • Drug use: No       Medications:   Current Outpatient Medications:   •  Accu-Chek Alondra Plus test strip, TEST THREE TIMES DAILY  AS  INSTRUCTED, Disp: 300 each, Rfl: 3  •  ACCU-CHEK SOFTCLIX LANCETS lancets, 1 each by Other route 3 (Three) Times a Day. Use as instructed, Disp: 300 each, Rfl: 3  •  aspirin 81 MG EC tablet, Take 81 mg by mouth Daily., Disp: , Rfl:   •  B Complex-C (B-complex with vitamin C) tablet, Take 1 tablet by mouth Daily., Disp: 90 tablet, Rfl: 3  •  bisacodyl (bisacodyl) 5 MG EC tablet, Take 1 tablet by mouth Daily., Disp: 4 tablet, Rfl: 0  •  Blood Gluc Meter Disp-Strips device, 1 strip 3 (Three) Times a Day. Patient needs Accu-chek meter and test strips., Disp: 1 each, Rfl: 0  •  Blood Glucose Calibration (SURECHEK CONTROL SOLUTION) NORMAL liquid, 1 bottle by In Vitro route every 30 (thirty) days., Disp: 3 each, Rfl: 3  •  Blood Glucose Monitoring Suppl (Accu-Chek Alondra Plus) w/Device kit, for testing sugar daily, Disp: 1 kit, Rfl: 0  •  Cholecalciferol  Statement Selected "(VITAMIN D) 2000 UNITS tablet, Take 1 capsule by mouth 3 (Three) Times a Week., Disp: , Rfl:   •  gabapentin (NEURONTIN) 300 MG capsule, Take 1 capsule by mouth Take As Directed. 1 nightly for 3 days, 1 twice a day for 3 days, 1 three times a day thereafter, Disp: 90 capsule, Rfl: 1  •  glimepiride (AMARYL) 4 MG tablet, Take 1 tablet by mouth Daily., Disp: 90 tablet, Rfl: 3  •  Januvia 100 MG tablet, TAKE 1 TABLET EVERY DAY, Disp: 90 tablet, Rfl: 3  •  losartan-hydrochlorothiazide (HYZAAR) 50-12.5 MG per tablet, Take 1 tablet by mouth Daily., Disp: 90 tablet, Rfl: 3  •  metFORMIN (GLUCOPHAGE) 500 MG tablet, Take 1 tablet by mouth 2 (Two) Times a Day With Meals., Disp: 15 tablet, Rfl: 0  •  oxyCODONE (ROXICODONE) 5 MG immediate release tablet, TAKE 1 TABLET BY MOUTH EVERY FOUR TO SIX HOURS FOR MODERATE PAIN, Disp: , Rfl:   •  traMADol (ULTRAM) 50 MG tablet, TAKE 2 TABLETS BY MOUTH EVERY SIX HOURS FOR BREAK THROUGH PAIN, Disp: , Rfl:        Allergies:   Allergies   Allergen Reactions   • Statins Myalgia       OBJECTIVE:    Vital Signs:  Vitals:    01/18/23 1104   BP: 118/74   BP Location: Left arm   Patient Position: Sitting   Cuff Size: Adult   Pulse: 112   Temp: 96.5 °F (35.8 °C)   TempSrc: Temporal   SpO2: 98%   Weight: 86.6 kg (191 lb)   Height: 185.4 cm (73\")       Physical Exam:   General Appearance:    Alert, cooperative, in no acute distress   Head:    Normocephalic, without obvious abnormality, atraumatic   Eyes:            Lids and lashes normal, conjunctivae and sclerae normal, no   icterus, no pallor, corneas clear, PERRL   Ears:    Ears appear intact with no abnormalities noted   Throat:   No oral lesions, no thrush, oral mucosa moist   Neck:   No adenopathy, supple, trachea midline, no thyromegaly,  no JVD   Lungs:     Clear to auscultation,respirations regular, even and                  unlabored    Heart:    Regular rhythm and normal rate, normal S1 and S2, no            murmur   Abdomen:     no " masses, no organomegaly, soft non-tender, non-distended, no guarding  Rectal-enlarged prostate, unable to palpate a mass in the rectum.   Extremities:   Moves all extremities well, no edema, no cyanosis, no             redness   Pulses:   Pulses palpable and equal bilaterally   Skin:   No bleeding, bruising or rash   Lymph nodes:   No palpable adenopathy   Neurologic:   Cranial nerves 2 - 12 grossly intact, sensation intact  Psychiatric: No evidence of depression or anxiety   Results Review:   I reviewed the patient's new clinical results.    Review of Systems was reviewed and confirmed as accurate as documented by the MA.    ASSESSMENT/PLAN:    1. Abnormal CT scan, colon    2. Benign prostatic hyperplasia with urinary frequency        I recommend a colonoscopy for further evaluation. The procedure was explained as well as the risks which include but are not limited to bleeding, infection, perforation, abdominal pain etc. The patient understands these risks and the procedure and wishes to proceed.  Patient reassured no rectal mass palpated, he understands he is due for colonoscopy.  Continue high-fiber diet and continue to follow-up with urology for problems with prostate enlargement.     Electronically signed by Ora Mari MD  01/18/23  10:31 EST

## 2023-01-16 NOTE — PROGRESS NOTES
Subjective     Patient ID: Fazal Berg III is a 77 y.o. male. Patient is here for management of multiple medical problems.     Chief Complaint   Patient presents with   • Diabetes   • Chronic Kidney Disease     History of Present Illness       Wt loss going well.      CKD.       Recenet TKR.   Dr Davidson.     The following portions of the patient's history were reviewed and updated as appropriate: allergies, current medications, past family history, past medical history, past social history, past surgical history and problem list.    Review of Systems    Current Outpatient Medications:   •  Accu-Chek Alondra Plus test strip, TEST THREE TIMES DAILY  AS  INSTRUCTED, Disp: 300 each, Rfl: 3  •  ACCU-CHEK SOFTCLIX LANCETS lancets, 1 each by Other route 3 (Three) Times a Day. Use as instructed, Disp: 300 each, Rfl: 3  •  aspirin 81 MG EC tablet, Take 81 mg by mouth Daily., Disp: , Rfl:   •  B Complex-C (B-complex with vitamin C) tablet, Take 1 tablet by mouth Daily., Disp: 90 tablet, Rfl: 3  •  bisacodyl (bisacodyl) 5 MG EC tablet, Take 1 tablet by mouth Daily., Disp: 4 tablet, Rfl: 0  •  Blood Gluc Meter Disp-Strips device, 1 strip 3 (Three) Times a Day. Patient needs Accu-chek meter and test strips., Disp: 1 each, Rfl: 0  •  Blood Glucose Calibration (SURECHEK CONTROL SOLUTION) NORMAL liquid, 1 bottle by In Vitro route every 30 (thirty) days., Disp: 3 each, Rfl: 3  •  Blood Glucose Monitoring Suppl (Accu-Chek Alondra Plus) w/Device kit, for testing sugar daily, Disp: 1 kit, Rfl: 0  •  Cholecalciferol (VITAMIN D) 2000 UNITS tablet, Take 1 capsule by mouth 3 (Three) Times a Week., Disp: , Rfl:   •  glimepiride (AMARYL) 4 MG tablet, Take 1 tablet by mouth Daily., Disp: 90 tablet, Rfl: 3  •  Januvia 100 MG tablet, TAKE 1 TABLET EVERY DAY, Disp: 90 tablet, Rfl: 3  •  losartan-hydrochlorothiazide (HYZAAR) 50-12.5 MG per tablet, Take 1 tablet by mouth Daily., Disp: 90 tablet, Rfl: 3  •  metFORMIN (GLUCOPHAGE) 500 MG tablet,  "Take 1 tablet by mouth 2 (Two) Times a Day With Meals., Disp: 15 tablet, Rfl: 0  •  oxyCODONE (ROXICODONE) 5 MG immediate release tablet, TAKE 1 TABLET BY MOUTH EVERY FOUR TO SIX HOURS FOR MODERATE PAIN, Disp: , Rfl:   •  traMADol (ULTRAM) 50 MG tablet, TAKE 2 TABLETS BY MOUTH EVERY SIX HOURS FOR BREAK THROUGH PAIN, Disp: , Rfl:   •  gabapentin (NEURONTIN) 300 MG capsule, Take 1 capsule by mouth Take As Directed. 1 nightly for 3 days, 1 twice a day for 3 days, 1 three times a day thereafter, Disp: 90 capsule, Rfl: 1    Objective      Blood pressure 120/76, pulse 65, temperature 96.5 °F (35.8 °C), resp. rate 16, height 185.4 cm (73\"), weight 87.1 kg (192 lb), SpO2 99 %.    Physical Exam     General Appearance:    Alert, cooperative, no distress, appears stated age   Head:    Normocephalic, without obvious abnormality, atraumatic   Eyes:    PERRL, conjunctiva/corneas clear, EOM's intact   Ears:    Normal TM's and external ear canals, both ears   Nose:   Nares normal, septum midline, mucosa normal, no drainage   or sinus tenderness   Throat:   Lips, mucosa, and tongue normal; teeth and gums normal   Neck:   Supple, symmetrical, trachea midline, no adenopathy;        thyroid:  No enlargement/tenderness/nodules; no carotid    bruit or JVD   Back:     Symmetric, no curvature, ROM normal, no CVA tenderness   Lungs:     Clear to auscultation bilaterally, respirations unlabored   Chest wall:    No tenderness or deformity   Heart:    Regular rate and rhythm, S1 and S2 normal, no murmur,        rub or gallop   Abdomen:     Soft, non-tender, bowel sounds active all four quadrants,     no masses, no organomegaly   Extremities:   Extremities normal, atraumatic, no cyanosis or edema   Pulses:   2+ and symmetric all extremities   Skin:   Skin color, texture, turgor normal, no rashes or lesions   Lymph nodes:   Cervical, supraclavicular, and axillary nodes normal   Neurologic:   CNII-XII intact. Normal strength, sensation and " reflexes       throughout      Results for orders placed or performed in visit on 12/13/22   Comprehensive Metabolic Panel    Specimen: Blood   Result Value Ref Range    Glucose 58 (L) 65 - 99 mg/dL    BUN 26 (H) 8 - 23 mg/dL    Creatinine 1.29 (H) 0.76 - 1.27 mg/dL    EGFR Result 57.8 (L) >60.0 mL/min/1.73    BUN/Creatinine Ratio 20.2 7.0 - 25.0    Sodium 131 (L) 136 - 145 mmol/L    Potassium 4.3 3.5 - 5.2 mmol/L    Chloride 94 (L) 98 - 107 mmol/L    Total CO2 26.9 22.0 - 29.0 mmol/L    Calcium 10.0 8.6 - 10.5 mg/dL    Total Protein 6.6 6.0 - 8.5 g/dL    Albumin 4.40 3.50 - 5.20 g/dL    Globulin 2.2 gm/dL    A/G Ratio 2.0 g/dL    Total Bilirubin 0.8 0.0 - 1.2 mg/dL    Alkaline Phosphatase 63 39 - 117 U/L    AST (SGOT) 22 1 - 40 U/L    ALT (SGPT) 19 1 - 41 U/L   Hemoglobin A1c    Specimen: Blood   Result Value Ref Range    Hemoglobin A1C 7.80 (H) 4.80 - 5.60 %   CBC & Differential    Specimen: Blood   Result Value Ref Range    WBC 7.46 3.40 - 10.80 10*3/mm3    RBC 4.07 (L) 4.14 - 5.80 10*6/mm3    Hemoglobin 12.5 (L) 13.0 - 17.7 g/dL    Hematocrit 37.8 37.5 - 51.0 %    MCV 92.9 79.0 - 97.0 fL    MCH 30.7 26.6 - 33.0 pg    MCHC 33.1 31.5 - 35.7 g/dL    RDW 12.5 12.3 - 15.4 %    Platelets 325 140 - 450 10*3/mm3    Neutrophil Rel % 54.7 42.7 - 76.0 %    Lymphocyte Rel % 29.1 19.6 - 45.3 %    Monocyte Rel % 11.7 5.0 - 12.0 %    Eosinophil Rel % 2.9 0.3 - 6.2 %    Basophil Rel % 1.1 0.0 - 1.5 %    Neutrophils Absolute 4.08 1.70 - 7.00 10*3/mm3    Lymphocytes Absolute 2.17 0.70 - 3.10 10*3/mm3    Monocytes Absolute 0.87 0.10 - 0.90 10*3/mm3    Eosinophils Absolute 0.22 0.00 - 0.40 10*3/mm3    Basophils Absolute 0.08 0.00 - 0.20 10*3/mm3    Immature Granulocyte Rel % 0.5 0.0 - 0.5 %    Immature Grans Absolute 0.04 0.00 - 0.05 10*3/mm3    nRBC 0.0 0.0 - 0.2 /100 WBC         Assessment & Plan     Pt wt down 20 lbs.    Pt doing well.    Pt was started on gabapentin by Dr Acosta. Helped enough to not complain about his foot.            Diagnoses and all orders for this visit:    1. Prostate cancer screening (Primary)  -     TSH  -     Vitamin B12  -     Comprehensive Metabolic Panel  -     CBC & Differential  -     Lipid Panel  -     PSA Screen  -     Hemoglobin A1c  -     MicroAlbumin, Urine, Random - Urine, Clean Catch    2. Hereditary and idiopathic peripheral neuropathy  -     gabapentin (NEURONTIN) 300 MG capsule; Take 1 capsule by mouth Take As Directed. 1 nightly for 3 days, 1 twice a day for 3 days, 1 three times a day thereafter  Dispense: 90 capsule; Refill: 1  -     TSH  -     Vitamin B12  -     Comprehensive Metabolic Panel  -     CBC & Differential  -     Lipid Panel  -     PSA Screen  -     Hemoglobin A1c  -     MicroAlbumin, Urine, Random - Urine, Clean Catch    3. Type 2 diabetes, controlled, with peripheral neuropathy (HCC)  -     TSH  -     Vitamin B12  -     Comprehensive Metabolic Panel  -     CBC & Differential  -     Lipid Panel  -     PSA Screen  -     Hemoglobin A1c  -     MicroAlbumin, Urine, Random - Urine, Clean Catch      Return in about 3 months (around 4/16/2023).          There are no Patient Instructions on file for this visit.     Matthew Carr MD    Assessment & Plan

## 2023-01-18 ENCOUNTER — OFFICE VISIT (OUTPATIENT)
Dept: SURGERY | Facility: CLINIC | Age: 78
End: 2023-01-18
Payer: MEDICARE

## 2023-01-18 VITALS
HEART RATE: 112 BPM | TEMPERATURE: 96.5 F | DIASTOLIC BLOOD PRESSURE: 74 MMHG | HEIGHT: 73 IN | OXYGEN SATURATION: 98 % | BODY MASS INDEX: 25.31 KG/M2 | SYSTOLIC BLOOD PRESSURE: 118 MMHG | WEIGHT: 191 LBS

## 2023-01-18 DIAGNOSIS — N40.1 BENIGN PROSTATIC HYPERPLASIA WITH URINARY FREQUENCY: ICD-10-CM

## 2023-01-18 DIAGNOSIS — R93.3 ABNORMAL CT SCAN, COLON: Primary | ICD-10-CM

## 2023-01-18 DIAGNOSIS — R35.0 BENIGN PROSTATIC HYPERPLASIA WITH URINARY FREQUENCY: ICD-10-CM

## 2023-01-18 PROCEDURE — 99213 OFFICE O/P EST LOW 20 MIN: CPT | Performed by: SURGERY

## 2023-01-18 RX ORDER — POLYETHYLENE GLYCOL 3350 17 G/17G
POWDER, FOR SOLUTION ORAL
Qty: 238 G | Refills: 0 | Status: SHIPPED | OUTPATIENT
Start: 2023-01-18

## 2023-01-18 RX ORDER — BISACODYL 5 MG
TABLET, DELAYED RELEASE (ENTERIC COATED) ORAL
Qty: 4 TABLET | Refills: 0 | Status: SHIPPED | OUTPATIENT
Start: 2023-01-18

## 2023-01-23 PROBLEM — R93.3 ABNORMAL CT SCAN, COLON: Status: ACTIVE | Noted: 2023-01-23

## 2023-01-24 ENCOUNTER — TELEPHONE (OUTPATIENT)
Dept: SURGERY | Facility: CLINIC | Age: 78
End: 2023-01-24
Payer: MEDICARE

## 2023-01-27 ENCOUNTER — ANESTHESIA EVENT (OUTPATIENT)
Dept: GASTROENTEROLOGY | Facility: HOSPITAL | Age: 78
End: 2023-01-27
Payer: MEDICARE

## 2023-01-27 ENCOUNTER — ANESTHESIA (OUTPATIENT)
Dept: GASTROENTEROLOGY | Facility: HOSPITAL | Age: 78
End: 2023-01-27
Payer: MEDICARE

## 2023-01-27 ENCOUNTER — HOSPITAL ENCOUNTER (OUTPATIENT)
Facility: HOSPITAL | Age: 78
Setting detail: HOSPITAL OUTPATIENT SURGERY
Discharge: HOME | End: 2023-01-27
Attending: SURGERY | Admitting: SURGERY
Payer: MEDICARE

## 2023-01-27 VITALS
DIASTOLIC BLOOD PRESSURE: 76 MMHG | HEART RATE: 84 BPM | OXYGEN SATURATION: 98 % | BODY MASS INDEX: 25.87 KG/M2 | SYSTOLIC BLOOD PRESSURE: 116 MMHG | TEMPERATURE: 98.3 F | WEIGHT: 191 LBS | RESPIRATION RATE: 16 BRPM | HEIGHT: 72 IN

## 2023-01-27 DIAGNOSIS — R35.0 BENIGN PROSTATIC HYPERPLASIA WITH URINARY FREQUENCY: ICD-10-CM

## 2023-01-27 DIAGNOSIS — N40.1 BENIGN PROSTATIC HYPERPLASIA WITH URINARY FREQUENCY: ICD-10-CM

## 2023-01-27 DIAGNOSIS — R93.3 ABNORMAL CT SCAN, COLON: ICD-10-CM

## 2023-01-27 LAB — GLUCOSE BLDC GLUCOMTR-MCNC: 173 MG/DL (ref 70–130)

## 2023-01-27 PROCEDURE — 88305 TISSUE EXAM BY PATHOLOGIST: CPT

## 2023-01-27 PROCEDURE — 25010000002 PROPOFOL 200 MG/20ML EMULSION: Performed by: NURSE ANESTHETIST, CERTIFIED REGISTERED

## 2023-01-27 PROCEDURE — 82962 GLUCOSE BLOOD TEST: CPT

## 2023-01-27 PROCEDURE — 45380 COLONOSCOPY AND BIOPSY: CPT | Performed by: SURGERY

## 2023-01-27 RX ORDER — ONDANSETRON 2 MG/ML
4 INJECTION INTRAMUSCULAR; INTRAVENOUS ONCE AS NEEDED
Status: DISCONTINUED | OUTPATIENT
Start: 2023-01-27 | End: 2023-01-27 | Stop reason: HOSPADM

## 2023-01-27 RX ORDER — LIDOCAINE HYDROCHLORIDE 20 MG/ML
INJECTION, SOLUTION INTRAVENOUS AS NEEDED
Status: DISCONTINUED | OUTPATIENT
Start: 2023-01-27 | End: 2023-01-27 | Stop reason: SURG

## 2023-01-27 RX ORDER — MAGNESIUM HYDROXIDE 1200 MG/15ML
LIQUID ORAL AS NEEDED
Status: DISCONTINUED | OUTPATIENT
Start: 2023-01-27 | End: 2023-01-27 | Stop reason: HOSPADM

## 2023-01-27 RX ORDER — PROPOFOL 10 MG/ML
INJECTION, EMULSION INTRAVENOUS AS NEEDED
Status: DISCONTINUED | OUTPATIENT
Start: 2023-01-27 | End: 2023-01-27 | Stop reason: SURG

## 2023-01-27 RX ORDER — SODIUM CHLORIDE, SODIUM LACTATE, POTASSIUM CHLORIDE, CALCIUM CHLORIDE 600; 310; 30; 20 MG/100ML; MG/100ML; MG/100ML; MG/100ML
1000 INJECTION, SOLUTION INTRAVENOUS CONTINUOUS
Status: DISCONTINUED | OUTPATIENT
Start: 2023-01-27 | End: 2023-01-27 | Stop reason: HOSPADM

## 2023-01-27 RX ADMIN — PROPOFOL 120 MG: 10 INJECTION, EMULSION INTRAVENOUS at 10:51

## 2023-01-27 RX ADMIN — LIDOCAINE HYDROCHLORIDE 40 MG: 20 INJECTION, SOLUTION INTRAVENOUS at 10:51

## 2023-01-27 RX ADMIN — SODIUM CHLORIDE, POTASSIUM CHLORIDE, SODIUM LACTATE AND CALCIUM CHLORIDE 1000 ML: 600; 310; 30; 20 INJECTION, SOLUTION INTRAVENOUS at 10:38

## 2023-01-27 NOTE — ANESTHESIA PREPROCEDURE EVALUATION
Anesthesia Evaluation     Patient summary reviewed and Nursing notes reviewed   no history of anesthetic complications:  NPO Solid Status: > 8 hours  NPO Liquid Status: > 8 hours           Airway   Mallampati: I  TM distance: >3 FB  Neck ROM: full  no difficulty expected and Possible difficult intubation  Dental - normal exam     Pulmonary - normal exam   (+) asthma,shortness of breath, sleep apnea,   Cardiovascular - normal exam    Rhythm: regular  Rate: normal    (+) hypertension, hyperlipidemia,     ROS comment: Nuclear stress 2018  Interpretation Summary    Technically adequate study   1) No evidence for inducible ischemia on perfusion images   2) Normal LV systolic function ( EF 65%) with normal LVedv      Neuro/Psych  (+) numbness,    GI/Hepatic/Renal/Endo    (+)  GERD,  diabetes mellitus,     Musculoskeletal     Abdominal  - normal exam    Abdomen: soft.  Bowel sounds: normal.   Substance History      OB/GYN          Other   arthritis,    history of cancer            Anesthesia Evaluation     Patient summary reviewed and Nursing notes reviewed   NPO Solid Status: > 8 hours  NPO Liquid Status: > 8 hours           Airway   Mallampati: II  TM distance: >3 FB  Neck ROM: full  No difficulty expected  Dental - normal exam     Pulmonary - normal exam   (+) pneumonia resolved , asthma, shortness of breath, recent URI resolved, sleep apnea,     ROS comment: Noncompliant with CPAP  Cardiovascular - normal exam  Exercise tolerance: good (4-7 METS)    ECG reviewed  Patient on routine beta blocker and Beta blocker given within 24 hours of surgery    (+) hypertension well controlled 2 medications or greater, hyperlipidemia,     ROS comment: 6/14/2018    Stress test:  Technically adequate study   1) No evidence for inducible ischemia on perfusion images   2) Normal LV systolic function ( EF 65%) with normal LVedv    Neuro/Psych  (+) numbness,     GI/Hepatic/Renal/Endo    (+)  hiatal hernia, GERD well controlled,  diabetes  mellitus type 2,     Musculoskeletal     Abdominal  - normal exam   Substance History - negative use     OB/GYN negative ob/gyn ROS         Other   (+) arthritis   history of cancer      Other Comment: Skin and prostate CA                  Anesthesia Plan    ASA 2     MAC   (Patient advised that intravenous sedation would be utilized as primary anesthetic technique. Every effort will be made to make sure patient is comfortable. Patient advised that they may experience recall of events of the procedure. Patient verbalized understanding and agreed to plan. )  intravenous induction   Anesthetic plan and risks discussed with patient.             Anesthesia Plan    ASA 3     MAC     (Risks and benefits discussed including risk of aspiration, recall and dental damage. All patient questions answered. Will continue with POC.)  intravenous induction     Anesthetic plan, risks, benefits, and alternatives have been provided, discussed and informed consent has been obtained with: patient.  Pre-procedure education provided      CODE STATUS:

## 2023-01-27 NOTE — ANESTHESIA POSTPROCEDURE EVALUATION
Patient: Fazal Berg III    Procedure Summary     Date: 01/27/23 Room / Location: [facility] ENDOSCOPY 2 / [facility] ENDOSCOPY    Anesthesia Start: 1047 Anesthesia Stop: 1106    Procedure: COLONOSCOPY with biopsy Diagnosis:       Abnormal CT scan, colon      Benign prostatic hyperplasia with urinary frequency      (Abnormal CT scan, colon [R93.3])      (Benign prostatic hyperplasia with urinary frequency [N40.1, R35.0])    Surgeons: Ora Mari MD Provider: Amari Mckenzie CRNA    Anesthesia Type: MAC ASA Status: 3          Anesthesia Type: MAC    Vitals  No vitals data found for the desired time range.          Post Anesthesia Care and Evaluation    Patient location during evaluation: PHASE II  Patient participation: complete - patient participated  Level of consciousness: awake and alert  Pain management: adequate    Airway patency: patent  Anesthetic complications: No anesthetic complications  PONV Status: none  Cardiovascular status: acceptable  Respiratory status: acceptable  Hydration status: acceptable  No anesthesia care post op

## 2023-01-30 LAB — REF LAB TEST METHOD: NORMAL

## 2023-04-11 DIAGNOSIS — G60.9 HEREDITARY AND IDIOPATHIC PERIPHERAL NEUROPATHY: ICD-10-CM

## 2023-04-11 NOTE — TELEPHONE ENCOUNTER
Caller: Krystyna Berg    Relationship: Emergency Contact    Best call back number: [phone]    Requested Prescriptions:   Requested Prescriptions     Pending Prescriptions Disp Refills   • gabapentin (NEURONTIN) 300 MG capsule 90 capsule 1     Sig: Take 1 capsule by mouth Take As Directed. 1 nightly for 3 days, 1 twice a day for 3 days, 1 three times a day thereafter        Pharmacy where request should be sent: [id number] [place], [place] - [address] [facility]  - [phone] [facility] [phone] FX     Last office visit with prescribing clinician: 1/16/2023   Last telemedicine visit with prescribing clinician: 6/5/2023   Next office visit with prescribing clinician: 6/5/2023     Additional details provided by patient: MAY HAVE A WEEK LEFT. HE HAS THE EARLIEST APPOINTMENT AVAILABLE BUT WILL RUN OUT BEFORE.    Does the patient have less than a 3 day supply:  [x] Yes  [] No    Would you like a call back once the refill request has been completed: [] Yes [x] No    If the office needs to give you a call back, can they leave a voicemail: [x] Yes [] No  LEAVE MESSAGE ON THIS NUMBER [phone]    Katelynn Hopkins MA   04/11/23 12:57 EDT

## 2023-04-13 RX ORDER — GABAPENTIN 300 MG/1
300 CAPSULE ORAL 2 TIMES DAILY
Qty: 90 CAPSULE | Refills: 2 | Status: SHIPPED | OUTPATIENT
Start: 2023-04-13

## 2023-05-08 ENCOUNTER — OFFICE VISIT (OUTPATIENT)
Dept: NEUROLOGY | Facility: CLINIC | Age: 78
End: 2023-05-08
Payer: MEDICARE

## 2023-05-08 VITALS
HEART RATE: 64 BPM | HEIGHT: 72 IN | DIASTOLIC BLOOD PRESSURE: 68 MMHG | WEIGHT: 200 LBS | SYSTOLIC BLOOD PRESSURE: 130 MMHG | BODY MASS INDEX: 27.09 KG/M2 | OXYGEN SATURATION: 99 %

## 2023-05-08 DIAGNOSIS — E11.42 DIABETIC POLYNEUROPATHY ASSOCIATED WITH TYPE 2 DIABETES MELLITUS: ICD-10-CM

## 2023-05-08 DIAGNOSIS — G62.9 NEUROPATHY: Primary | ICD-10-CM

## 2023-05-08 PROCEDURE — 3075F SYST BP GE 130 - 139MM HG: CPT | Performed by: PSYCHIATRY & NEUROLOGY

## 2023-05-08 PROCEDURE — 99204 OFFICE O/P NEW MOD 45 MIN: CPT | Performed by: PSYCHIATRY & NEUROLOGY

## 2023-05-08 PROCEDURE — 1159F MED LIST DOCD IN RCRD: CPT | Performed by: PSYCHIATRY & NEUROLOGY

## 2023-05-08 PROCEDURE — 1160F RVW MEDS BY RX/DR IN RCRD: CPT | Performed by: PSYCHIATRY & NEUROLOGY

## 2023-05-08 PROCEDURE — 3078F DIAST BP <80 MM HG: CPT | Performed by: PSYCHIATRY & NEUROLOGY

## 2023-05-08 RX ORDER — GABAPENTIN 300 MG/1
600 CAPSULE ORAL 3 TIMES DAILY
Qty: 180 CAPSULE | Refills: 3 | Status: SHIPPED | OUTPATIENT
Start: 2023-05-08 | End: 2024-05-07

## 2023-05-08 RX ORDER — GABAPENTIN 300 MG/1
600 CAPSULE ORAL 3 TIMES DAILY
Qty: 180 CAPSULE | Refills: 3 | Status: CANCELLED | OUTPATIENT
Start: 2023-05-08 | End: 2023-06-07

## 2023-05-08 NOTE — PROGRESS NOTES
Subjective:    CC: Fazal Berg III is seen today in consultation at the request of Matthew Carr MD for Poor balance and Fatigue       HPI:    Patient is a 78-year-old male with past medical history of hypertension, hyperlipidemia, type 2 diabetes mellitus, and history of diabetic peripheral neuropathy, who was referred for evaluation and management of diabetic neuropathy, as well as difficulty with balance and gait. He is accompanied by his wife.    He complains of tingling, numbness, pins-and-needles and pain in both his feet that radiates up into his bilateral calves.  He reports that symptoms started years ago and it has progressively become worse.  He denies paresthesia in his hands, though he does experience bilateral hand stiffness. He describes a sensation of tightness in his bilateral lower extremities and pain similar to a sunburn. He has pain and paresthesia throughout the day but somewhat worse at night. He denies insomnia or waking up due to neuropathic pain. He has had type 2 diabetes mellitus for 25 years. He has been taking gabapentin 300 mg 3 times daily for 6 to 8 months, which provides mild relief. He denies any side effects. He has been riding a stationary bike 2 to 2.5 miles daily for approximately 1 month as his balance is poor. He has difficulty picking up his feet. He had a left knee replacement approximately 4 months ago after a fall.    The following portions of the patient's history were reviewed today and updated as of 05/08/2023  : allergies, social history and problem list.  This document will be scanned to patient's chart.      Current Outpatient Medications:   •  Accu-Chek Alondra Plus test strip, TEST THREE TIMES DAILY  AS  INSTRUCTED, Disp: 300 each, Rfl: 3  •  ACCU-CHEK SOFTCLIX LANCETS lancets, 1 each by Other route 3 (Three) Times a Day. Use as instructed, Disp: 300 each, Rfl: 3  •  aspirin 81 MG EC tablet, Take 1 tablet by mouth Daily., Disp: , Rfl:   •  B Complex-C  "(B-complex with vitamin C) tablet, Take 1 tablet by mouth Daily., Disp: 90 tablet, Rfl: 3  •  bisacodyl (bisacodyl) 5 MG EC tablet, Take 1 tablet by mouth Daily., Disp: 4 tablet, Rfl: 0  •  bisacodyl 5 MG EC tablet, Use as directed., Disp: 4 tablet, Rfl: 0  •  Blood Gluc Meter Disp-Strips device, 1 strip 3 (Three) Times a Day. Patient needs Accu-chek meter and test strips., Disp: 1 each, Rfl: 0  •  Blood Glucose Calibration (SURECHEK CONTROL SOLUTION) NORMAL liquid, 1 bottle by In Vitro route every 30 (thirty) days., Disp: 3 each, Rfl: 3  •  Blood Glucose Monitoring Suppl (Accu-Chek Alondra Plus) w/Device kit, for testing sugar daily, Disp: 1 kit, Rfl: 0  •  Cholecalciferol (VITAMIN D) 2000 UNITS tablet, Take 1 capsule by mouth Daily., Disp: , Rfl:   •  glimepiride (AMARYL) 4 MG tablet, Take 1 tablet by mouth Daily., Disp: 90 tablet, Rfl: 3  •  Januvia 100 MG tablet, TAKE 1 TABLET EVERY DAY, Disp: 90 tablet, Rfl: 3  •  losartan-hydrochlorothiazide (HYZAAR) 50-12.5 MG per tablet, Take 1 tablet by mouth Daily., Disp: 90 tablet, Rfl: 3  •  gabapentin (Neurontin) 300 MG capsule, Take 2 capsules by mouth 3 (Three) Times a Day., Disp: 180 capsule, Rfl: 3   Past Medical History:   Diagnosis Date   • Acute sinusitis    • Acute URI    • Allergic rhinitis    • Anemia    • Arthritis    • Asthma    • Benign prostatic hypertrophy    • Bronchitis    • Chest pain     \"I'VE SEEN DR. CARRASCO FOR IT A LONG TIME AGO, AND THAT'S WHEN HE DID TESTING\".  STATES HAS NOT MENTIONED IT TO DR. KHAN.  STATES UNSURE OF WHEN LAST TIME WAS\".  PT STATES \"I'M NOT A COMPLAINER\".  STATES COLD WEATHER AND WALKING BRING IT ON.  GETS SHORTNESS OF BREATH OCCASSIONALLY.  MORE DESCRIBED AS PRESSURE.    • Diverticulitis    • ED (erectile dysfunction)    • Elevated cholesterol    • Full dentures     INSTRUCTED NO ADHESIVES THE DOS   • GERD (gastroesophageal reflux disease)    • H/O echocardiogram 2003   • Hiatal hernia    • History of fracture     REPORTS TOE " "ON RIGHT FOOT - NO SURGICAL INTERVENTION REQUIRED   • Jackson (hard of hearing)     SPOUSE REPORTS NO USE OF HEARING AIDS   • Hx of exercise stress test     SPOUSE REPORTS LAST DONE BY DR ALMANZA AROUND JUNE 2018 AND REPORTED ALL WNL'S AND MD RELEASED PATIENT   • Hyperlipidemia    • Hypertension    • Irregular heart beat    • Irritable bowel syndrome    • Jaundice     AS A TEEN    • Obstructive sleep apnea     NO CPAP -  states lost weight, and doesn't have it now.   • Peripheral neuropathy    • Pneumonia     SPOUSE REPORTS HISTORY   • Prostate cancer    • Short of breath on exertion    • Skin cancer    • Type 2 diabetes mellitus    • Urinary frequency    • Vitamin B 12 deficiency    • Vitamin D deficiency    • Wears glasses       Past Surgical History:   Procedure Laterality Date   • APPENDECTOMY     • BACK SURGERY      Dr. Montenegro - 1997?   • CARDIAC CATHETERIZATION  2003?    \"IT WAS OK\"   • CHOLECYSTECTOMY WITH INTRAOPERATIVE CHOLANGIOGRAM N/A 05/15/2018    Procedure: CHOLECYSTECTOMY LAPAROSCOPIC INTRAOPERATIVE CHOLANGIOGRAM;  Surgeon: Eric Bran MD;  Location: [facility] OR;  Service: General   • COLONOSCOPY N/A 08/08/2018    Procedure: COLONOSCOPY;  Surgeon: Eric Bran MD;  Location: [facility] ENDOSCOPY;  Service: Gastroenterology   • COLONOSCOPY N/A 1/27/2023    Procedure: COLONOSCOPY with biopsy;  Surgeon: Ora Mari MD;  Location: [facility] ENDOSCOPY;  Service: Gastroenterology;  Laterality: N/A;   • ENDOSCOPY     • HAND SURGERY Left    • KNEE ARTHROSCOPY Left    • MOUTH SURGERY      FULL MOUTH EXTRACTION   • PROSTATE SURGERY  07/26/2022    Maury Rodriguez   • REPLACEMENT TOTAL KNEE Left 12/2022   • SKIN BIOPSY     • SKIN CANCER EXCISION  07/22/2019    skin cancer on head   • VASECTOMY  1977      Family History   Problem Relation Age of Onset   • Diabetes Mother    • Arthritis Mother    • Diabetes Father    • Alcohol abuse Father    • Arthritis Other    • Gout Other    • Heart disease Other    • Lung " "disease Other       Review of Systems    All other systems reviewed and are negative     Objective:    /68   Pulse 64   Ht 182.9 cm (72\")   Wt 90.7 kg (200 lb)   SpO2 99%   BMI 27.12 kg/m²     Neurology Exam:    General appearance: NAD.     Mental status: Alert, awake and oriented to time place and person.    Fund of knowledge:  Normal.     Language and Speech: No aphasia or dysarthria.    Naming , Repetition and Comprehension:  Can name objects, repeat a sentence and follow commands. Speech is clear and fluent with good repetition, comprehension, and naming.    Cranial Nerves:   CN II: Visual fields are full. Intact. Fundi - Normal, No papilledema, Pupils - TYLER  CN III, IV and VI: Extraocular movements are intact. Normal saccades.   CN V: Facial sensation is intact.   CN VII: Muscles of facial expression reveal no asymmetry. Intact.   CN VIII: Hearing is intact. Whispered voice intact.   CN IX and X: Palate elevates symmetrically. Intact  CN XI: Shoulder shrug is intact.   CN XII: Tongue is midline without evidence of atrophy or fasciculation.     Motor:  Right UE muscle strength 5/5. Normal tone.     Left UE muscle strength 5/5. Normal tone.      Right LE muscle strength5/5. Normal tone.     Left LE muscle strength 5/5. Normal tone.      Sensory: Severely reduced vibration sensation bilateral toes. Reduced light touch and pinprick sensation bilateral feet.    DTRs: 1+ bilaterally in upper extremities, 1+ bilateral knees, absent bilateral ankles.    Babinski: Negative bilaterally.    Co-ordination: Normal finger-to-nose, heel to shin B/L.    Rhomberg: Positive.    Gait: Slightly slow, mildly unstable.    Cardiovascular: Regular rate and rhythm without murmur, gallop or rub.    Ophthalmoscopic exam: Normal fundi, no papilledema.    Assessment and Plan:  1. Neuropathy  2. Diabetic polyneuropathy associated with type 2 diabetes mellitus    - Patient with long term history of type 2 diabetes mellitus " presents with symptoms of diabetic neuropathy. He has noticed worsening in symptoms in the last 2 to 3 years. Predominantly, symptoms are pins-and-needles sensation, burning, and pain in both his feet extending up to the mid calf bilaterally. He has been on gabapentin 300 mg 3 times daily dose for the last 6 to 7 months, which has helped some. He denies any side effects with gabapentin use. I will be increasing it to 600 mg 3 times daily for better therapeutic effect. He has noticed difficulty with gait and balance, which is caused by diabetic neuropathy. I have advised him to be very careful and use assistance if needed to prevent from falling. His last 1 year hemoglobin A1c has ranged from 7.2 to 8.2 percent range. I have advised him to maintain strict glycemic control to prevent further worsening in diabetic neuropathy symptoms. Otherwise, I will see him back in clinic in 3 months for follow-up.     - gabapentin (Neurontin) 300 MG capsule; Take 2 capsules by mouth 3 (Three) Times a Day.  Dispense: 180 capsule; Refill: 3         Return in about 3 months (around 8/8/2023).     Moe Jackman MD       Note to patient: The 21st Century Cures Act makes medical notes like these available to patients in the interest of transparency. However, be advised this is a medical document. It is intended as peer to peer communication. It is written in medical language and may contain abbreviations or verbiage that are unfamiliar. It may appear blunt or direct. Medical documents are intended to carry relevant information, facts as evident, and the clinical opinion of the physician.     Transcribed from ambient dictation for Moe Jackman MD by Comfort Mccarty, [employer] .    Patient verbalized consent to the visit recording.  I have personally performed the services described in this document as transcribed by the above individual, and it is both accurate and complete.  Moe Jackman MD  5/8/2023

## 2023-05-10 ENCOUNTER — TELEPHONE (OUTPATIENT)
Dept: UROLOGY | Facility: CLINIC | Age: 78
End: 2023-05-10
Payer: MEDICARE

## 2023-05-16 LAB
ALBUMIN SERPL-MCNC: 4.5 G/DL (ref 3.5–5.2)
ALBUMIN/GLOB SERPL: 2 G/DL
ALP SERPL-CCNC: 73 U/L (ref 39–117)
ALT SERPL-CCNC: 17 U/L (ref 1–41)
AST SERPL-CCNC: 21 U/L (ref 1–40)
BASOPHILS # BLD AUTO: 0.07 10*3/MM3 (ref 0–0.2)
BASOPHILS NFR BLD AUTO: 1.2 % (ref 0–1.5)
BILIRUB SERPL-MCNC: 0.6 MG/DL (ref 0–1.2)
BUN SERPL-MCNC: 30 MG/DL (ref 8–23)
BUN/CREAT SERPL: 24.8 (ref 7–25)
CALCIUM SERPL-MCNC: 10.8 MG/DL (ref 8.6–10.5)
CHLORIDE SERPL-SCNC: 101 MMOL/L (ref 98–107)
CHOLEST SERPL-MCNC: 188 MG/DL (ref 0–200)
CO2 SERPL-SCNC: 28.4 MMOL/L (ref 22–29)
CREAT SERPL-MCNC: 1.21 MG/DL (ref 0.76–1.27)
EGFRCR SERPLBLD CKD-EPI 2021: 62.1 ML/MIN/1.73
EOSINOPHIL # BLD AUTO: 0.33 10*3/MM3 (ref 0–0.4)
EOSINOPHIL NFR BLD AUTO: 5.9 % (ref 0.3–6.2)
ERYTHROCYTE [DISTWIDTH] IN BLOOD BY AUTOMATED COUNT: 12.5 % (ref 12.3–15.4)
GLOBULIN SER CALC-MCNC: 2.3 GM/DL
GLUCOSE SERPL-MCNC: 152 MG/DL (ref 65–99)
HBA1C MFR BLD: 7.4 % (ref 4.8–5.6)
HCT VFR BLD AUTO: 38.8 % (ref 37.5–51)
HDLC SERPL-MCNC: 47 MG/DL (ref 40–60)
HGB BLD-MCNC: 13.4 G/DL (ref 13–17.7)
IMM GRANULOCYTES # BLD AUTO: 0.03 10*3/MM3 (ref 0–0.05)
IMM GRANULOCYTES NFR BLD AUTO: 0.5 % (ref 0–0.5)
LDLC SERPL CALC-MCNC: 112 MG/DL (ref 0–100)
LYMPHOCYTES # BLD AUTO: 1.6 10*3/MM3 (ref 0.7–3.1)
LYMPHOCYTES NFR BLD AUTO: 28.5 % (ref 19.6–45.3)
MCH RBC QN AUTO: 31.7 PG (ref 26.6–33)
MCHC RBC AUTO-ENTMCNC: 34.5 G/DL (ref 31.5–35.7)
MCV RBC AUTO: 91.7 FL (ref 79–97)
MICROALBUMIN UR-MCNC: 12.1 UG/ML
MONOCYTES # BLD AUTO: 0.79 10*3/MM3 (ref 0.1–0.9)
MONOCYTES NFR BLD AUTO: 14.1 % (ref 5–12)
NEUTROPHILS # BLD AUTO: 2.79 10*3/MM3 (ref 1.7–7)
NEUTROPHILS NFR BLD AUTO: 49.8 % (ref 42.7–76)
NRBC BLD AUTO-RTO: 0 /100 WBC (ref 0–0.2)
PLATELET # BLD AUTO: 197 10*3/MM3 (ref 140–450)
POTASSIUM SERPL-SCNC: 4.9 MMOL/L (ref 3.5–5.2)
PROT SERPL-MCNC: 6.8 G/DL (ref 6–8.5)
PSA SERPL-MCNC: 3.05 NG/ML (ref 0–4)
RBC # BLD AUTO: 4.23 10*6/MM3 (ref 4.14–5.8)
SODIUM SERPL-SCNC: 141 MMOL/L (ref 136–145)
TRIGL SERPL-MCNC: 165 MG/DL (ref 0–150)
TSH SERPL DL<=0.005 MIU/L-ACNC: 0.91 UIU/ML (ref 0.27–4.2)
VIT B12 SERPL-MCNC: 345 PG/ML (ref 211–946)
VLDLC SERPL CALC-MCNC: 29 MG/DL (ref 5–40)
WBC # BLD AUTO: 5.61 10*3/MM3 (ref 3.4–10.8)

## 2023-05-18 ENCOUNTER — OFFICE VISIT (OUTPATIENT)
Dept: INTERNAL MEDICINE | Facility: CLINIC | Age: 78
End: 2023-05-18
Payer: MEDICARE

## 2023-05-18 VITALS
BODY MASS INDEX: 28.04 KG/M2 | TEMPERATURE: 96.7 F | OXYGEN SATURATION: 99 % | WEIGHT: 207 LBS | HEART RATE: 80 BPM | HEIGHT: 72 IN | DIASTOLIC BLOOD PRESSURE: 78 MMHG | RESPIRATION RATE: 16 BRPM | SYSTOLIC BLOOD PRESSURE: 142 MMHG

## 2023-05-18 DIAGNOSIS — E11.42 TYPE 2 DIABETES, CONTROLLED, WITH PERIPHERAL NEUROPATHY: ICD-10-CM

## 2023-05-18 DIAGNOSIS — Z12.5 ENCOUNTER FOR SCREENING FOR MALIGNANT NEOPLASM OF PROSTATE: ICD-10-CM

## 2023-05-18 PROCEDURE — 99214 OFFICE O/P EST MOD 30 MIN: CPT | Performed by: INTERNAL MEDICINE

## 2023-05-18 PROCEDURE — 3077F SYST BP >= 140 MM HG: CPT | Performed by: INTERNAL MEDICINE

## 2023-05-18 PROCEDURE — 3078F DIAST BP <80 MM HG: CPT | Performed by: INTERNAL MEDICINE

## 2023-05-18 RX ORDER — LOSARTAN POTASSIUM AND HYDROCHLOROTHIAZIDE 12.5; 5 MG/1; MG/1
1 TABLET ORAL DAILY
Qty: 90 TABLET | Refills: 3 | Status: SHIPPED | OUTPATIENT
Start: 2023-05-18

## 2023-05-18 RX ORDER — GLIMEPIRIDE 4 MG/1
4 TABLET ORAL DAILY
Qty: 90 TABLET | Refills: 3 | Status: SHIPPED | OUTPATIENT
Start: 2023-05-18

## 2023-05-18 NOTE — PROGRESS NOTES
Subjective     Patient ID: Fazal Berg III is a 78 y.o. male. Patient is here for management of multiple medical problems.     Chief Complaint   Patient presents with   • Diabetes     History of Present Illness     DM    htn    Elevated psa volocity. Followed by Dr De La Garza.      The following portions of the patient's history were reviewed and updated as appropriate: allergies, current medications, past family history, past medical history, past social history, past surgical history and problem list.    Review of Systems    Current Outpatient Medications:   •  ACCU-CHEK SOFTCLIX LANCETS lancets, 1 each by Other route 3 (Three) Times a Day. Use as instructed, Disp: 300 each, Rfl: 3  •  aspirin 81 MG EC tablet, Take 1 tablet by mouth Daily., Disp: , Rfl:   •  B Complex-C (B-complex with vitamin C) tablet, Take 1 tablet by mouth Daily., Disp: 90 tablet, Rfl: 3  •  bisacodyl (bisacodyl) 5 MG EC tablet, Take 1 tablet by mouth Daily., Disp: 4 tablet, Rfl: 0  •  bisacodyl 5 MG EC tablet, Use as directed., Disp: 4 tablet, Rfl: 0  •  Blood Gluc Meter Disp-Strips device, 1 strip 3 (Three) Times a Day. Patient needs Accu-chek meter and test strips., Disp: 1 each, Rfl: 0  •  Blood Glucose Calibration (SURECHEK CONTROL SOLUTION) NORMAL liquid, 1 bottle by In Vitro route every 30 (thirty) days., Disp: 3 each, Rfl: 3  •  Blood Glucose Monitoring Suppl (Accu-Chek Alondra Plus) w/Device kit, for testing sugar daily, Disp: 1 kit, Rfl: 0  •  Cholecalciferol (VITAMIN D) 2000 UNITS tablet, Take 1 capsule by mouth Daily., Disp: , Rfl:   •  gabapentin (Neurontin) 300 MG capsule, Take 2 capsules by mouth 3 (Three) Times a Day., Disp: 180 capsule, Rfl: 3  •  glimepiride (AMARYL) 4 MG tablet, Take 1 tablet by mouth Daily., Disp: 90 tablet, Rfl: 3  •  glucose blood (Accu-Chek Alondra Plus) test strip, 1 each by Other route As Needed (daily as needed). Use as instructed, Disp: 300 each, Rfl: 3  •  losartan-hydrochlorothiazide (HYZAAR) 50-12.5  "MG per tablet, Take 1 tablet by mouth Daily., Disp: 90 tablet, Rfl: 3  •  SITagliptin (Januvia) 100 MG tablet, Take 1 tablet by mouth Daily., Disp: 90 tablet, Rfl: 3    Objective      Blood pressure 142/78, pulse 80, temperature 96.7 °F (35.9 °C), resp. rate 16, height 182.9 cm (72\"), weight 93.9 kg (207 lb), SpO2 99 %.    Physical Exam     General Appearance:    Alert, cooperative, no distress, appears stated age   Head:    Normocephalic, without obvious abnormality, atraumatic   Eyes:    PERRL, conjunctiva/corneas clear, EOM's intact   Ears:    Normal TM's and external ear canals, both ears   Nose:   Nares normal, septum midline, mucosa normal, no drainage   or sinus tenderness   Throat:   Lips, mucosa, and tongue normal; teeth and gums normal   Neck:   Supple, symmetrical, trachea midline, no adenopathy;        thyroid:  No enlargement/tenderness/nodules; no carotid    bruit or JVD   Back:     Symmetric, no curvature, ROM normal, no CVA tenderness   Lungs:     Clear to auscultation bilaterally, respirations unlabored   Chest wall:    No tenderness or deformity   Heart:    Regular rate and rhythm, S1 and S2 normal, no murmur,        rub or gallop   Abdomen:     Soft, non-tender, bowel sounds active all four quadrants,     no masses, no organomegaly   Extremities:   Extremities normal, atraumatic, no cyanosis or edema   Pulses:   2+ and symmetric all extremities   Skin:   Skin color, texture, turgor normal, no rashes or lesions   Lymph nodes:   Cervical, supraclavicular, and axillary nodes normal   Neurologic:   CNII-XII intact. Normal strength, sensation and reflexes       throughout      Results for orders placed or performed during the hospital encounter of 01/27/23   POC Glucose Once    Specimen: Blood   Result Value Ref Range    Glucose 173 (H) 70 - 130 mg/dL   TISSUE EXAM, [facility] ([facility])    Specimen: Large Intestine, Rectum; Tissue   Result Value Ref Range    Reference Lab Report       [facility]  [address] [place] [address]  Phone: [phone] or [phone]  Fax: [phone]  Adrian Jonas M.D., Medical Director    PATIENT NAME                                     LABORATORY NO.  MARCY ABREU III                          [id number]  [id number]                                 AGE                    SEX   SSN              CLIENT REF #  [facility]                    77        1945           [id number]      [id number]    [address]                        REQUESTING M.D.           ATTENDING M.D.         COPY TO.  [address]                                [address] [place], [place] [address]                         DATE COLLECTED            DATE RECEIVED          DATE REPORTED  2023    DIAGNOSIS:  RECTAL BIOPSY:  Superficial hyperplastic changes suggestive of a hyperplastic polyp.    WJB    CLINICAL  HISTORY:  Abnormal CT scan, colon, benign prostatic hyperplasia with urinary frequency    SPECIMENS RECEIVED:  RECTAL BIOPSY    MICROSCOPIC DESCRIPTION:  Tissue blocks are prepared and slides are examined microscopically on all  specimens. See diagnosis for details.    Professional interpretation rendered by Jeremy Alejandre D.O. at [email or web address]),  [employer], [address] [place] [address].    GROSS DESCRIPTION:  Specimen is received in 1 formalin filled container labeled \"rectum\" and consists  of 3 portions of tan soft tissue measuring 0.4 x 0.3 x 0.2 cm in aggregate.  Submitted entirely in 1 cassette.  [facility]    REVIEWED, DIAGNOSED AND ELECTRONICALLY  SIGNED BY:    Jeremy Alejandre D.O.  CPT CODES:  27974           Assessment & Plan   DM    htn    Elevated psa volocity. Followed by Dr De La Garza.    Hypercal. first time. May be lab issues will recheck.    neurpathy of feet with foot deformity. Flattening of feet.   Onycomyucosis.  Some callous " formation.    Exercising more.   Ha1c down some.        Will fill out diabetic shoe information so he can have his shoes.          Diagnoses and all orders for this visit:    1. Type 2 diabetes, controlled, with peripheral neuropathy  -     glucose blood (Accu-Chek Alondra Plus) test strip; 1 each by Other route As Needed (daily as needed). Use as instructed  Dispense: 300 each; Refill: 3  -     TSH  -     Comprehensive Metabolic Panel  -     Vitamin B12  -     CBC & Differential  -     Lipid Panel  -     PSA Screen  -     Hemoglobin A1c  -     MicroAlbumin, Urine, Random - Urine, Clean Catch    2. Encounter for screening for malignant neoplasm of prostate  -     PSA Screen    Other orders  -     glimepiride (AMARYL) 4 MG tablet; Take 1 tablet by mouth Daily.  Dispense: 90 tablet; Refill: 3  -     SITagliptin (Januvia) 100 MG tablet; Take 1 tablet by mouth Daily.  Dispense: 90 tablet; Refill: 3  -     losartan-hydrochlorothiazide (HYZAAR) 50-12.5 MG per tablet; Take 1 tablet by mouth Daily.  Dispense: 90 tablet; Refill: 3      Return in about 6 months (around 11/18/2023).          There are no Patient Instructions on file for this visit.     Matthew Carr MD    Assessment & Plan       Answers for HPI/ROS submitted by the patient on 5/18/2023  What is the primary reason for your visit?: Other  Please describe your symptoms.: Follow up  Have you had these symptoms before?: Yes  How long have you been having these symptoms?: Greater than 2 weeks

## 2023-05-23 ENCOUNTER — TELEPHONE (OUTPATIENT)
Dept: INTERNAL MEDICINE | Facility: CLINIC | Age: 78
End: 2023-05-23

## 2023-05-23 NOTE — TELEPHONE ENCOUNTER
I have a pt here that has a referral to get diabetic footwear and needs a doctor to sign off on referral. I am going to put referral in Dr. Carr's box. Please let pt know when document is ready . Thank you

## 2023-05-24 NOTE — TELEPHONE ENCOUNTER
This form has already been signed and faxed to [facility], look under media and date of 05/08/2023, can you call patient and let him know and if he would like a copy we can print off this form that was already faxed over.

## 2023-06-01 ENCOUNTER — OFFICE VISIT (OUTPATIENT)
Dept: UROLOGY | Facility: CLINIC | Age: 78
End: 2023-06-01

## 2023-06-01 VITALS
SYSTOLIC BLOOD PRESSURE: 138 MMHG | DIASTOLIC BLOOD PRESSURE: 76 MMHG | WEIGHT: 207 LBS | TEMPERATURE: 97.2 F | HEIGHT: 72 IN | OXYGEN SATURATION: 98 % | HEART RATE: 68 BPM | BODY MASS INDEX: 28.04 KG/M2

## 2023-06-01 DIAGNOSIS — R97.20 ELEVATED PROSTATE SPECIFIC ANTIGEN (PSA): Primary | ICD-10-CM

## 2023-06-01 DIAGNOSIS — R39.9 LOWER URINARY TRACT SYMPTOMS (LUTS): ICD-10-CM

## 2023-06-01 LAB
BILIRUB BLD-MCNC: NEGATIVE MG/DL
CLARITY, POC: CLEAR
COLOR UR: YELLOW
EXPIRATION DATE: ABNORMAL
GLUCOSE UR STRIP-MCNC: NEGATIVE MG/DL
KETONES UR QL: NEGATIVE
LEUKOCYTE EST, POC: ABNORMAL
Lab: ABNORMAL
NITRITE UR-MCNC: POSITIVE MG/ML
PH UR: 5 [PH] (ref 5–8)
PROT UR STRIP-MCNC: NEGATIVE MG/DL
RBC # UR STRIP: NEGATIVE /UL
SP GR UR: 1.01 (ref 1–1.03)
UROBILINOGEN UR QL: NORMAL

## 2023-06-01 RX ORDER — CIPROFLOXACIN 500 MG/1
500 TABLET, FILM COATED ORAL 2 TIMES DAILY
Qty: 28 TABLET | Refills: 0 | Status: SHIPPED | OUTPATIENT
Start: 2023-06-01 | End: 2023-06-05

## 2023-06-05 DIAGNOSIS — B96.20 E. COLI UTI: Primary | ICD-10-CM

## 2023-06-05 DIAGNOSIS — N39.0 E. COLI UTI: Primary | ICD-10-CM

## 2023-06-05 RX ORDER — NITROFURANTOIN 25; 75 MG/1; MG/1
100 CAPSULE ORAL 2 TIMES DAILY
Qty: 14 CAPSULE | Refills: 0 | Status: SHIPPED | OUTPATIENT
Start: 2023-06-05

## 2023-06-05 NOTE — Clinical Note
Hey, last MDX.  Do you mind to call Mr. Berg and please let him know that the bacteria that grew from his urine is predicted to be resistant to Cipro that we started at the appointment?  I have switched him to Macrobid and sent it to [facility].

## 2023-06-05 NOTE — PROGRESS NOTES
Resolve MDX Urinary PCR was collected on 06/01/23 and revealed than 100,000 colony-forming units of E. coli and 10,000-99,999 colony-forming units of Enterococcus faecalis.    Sensitive to Augmentin, fosfomycin, nitrofurantoin, Levaquin.  Predicted to be resistant against Cipro.    We will switch to Macrobid.    Results will be scanned into the patient's media tab.

## 2023-08-15 RX ORDER — LOSARTAN POTASSIUM AND HYDROCHLOROTHIAZIDE 12.5; 5 MG/1; MG/1
1 TABLET ORAL DAILY
Qty: 90 TABLET | Refills: 3 | Status: SHIPPED | OUTPATIENT
Start: 2023-08-15

## 2023-08-15 RX ORDER — GLIMEPIRIDE 4 MG/1
4 TABLET ORAL DAILY
Qty: 90 TABLET | Refills: 3 | Status: SHIPPED | OUTPATIENT
Start: 2023-08-15

## 2023-08-15 NOTE — TELEPHONE ENCOUNTER
Caller: Krystyna Berg    Relationship: Emergency Contact    Best call back number: [phone]     Requested Prescriptions:   Requested Prescriptions     Pending Prescriptions Disp Refills    glimepiride (AMARYL) 4 MG tablet 90 tablet 3     Sig: Take 1 tablet by mouth Daily.    SITagliptin (Januvia) 100 MG tablet 90 tablet 3     Sig: Take 1 tablet by mouth Daily.    losartan-hydrochlorothiazide (HYZAAR) 50-12.5 MG per tablet 90 tablet 3     Sig: Take 1 tablet by mouth Daily.        Pharmacy where request should be sent: [facility] - [facility] [address] - [phone]  - [phone] FX     Last office visit with prescribing clinician: 5/18/2023   Last telemedicine visit with prescribing clinician: Visit date not found   Next office visit with prescribing clinician: 11/20/2023     Does the patient have less than a 3 day supply:  [] Yes  [x] No        Loretta Ordaz Rep   08/15/23 09:17 EDT

## 2023-08-30 ENCOUNTER — OFFICE VISIT (OUTPATIENT)
Dept: NEUROLOGY | Facility: CLINIC | Age: 78
End: 2023-08-30
Payer: MEDICARE

## 2023-08-30 VITALS
DIASTOLIC BLOOD PRESSURE: 72 MMHG | WEIGHT: 207 LBS | HEART RATE: 95 BPM | SYSTOLIC BLOOD PRESSURE: 142 MMHG | HEIGHT: 72 IN | OXYGEN SATURATION: 97 % | BODY MASS INDEX: 28.04 KG/M2

## 2023-08-30 DIAGNOSIS — E11.42 DIABETIC POLYNEUROPATHY ASSOCIATED WITH TYPE 2 DIABETES MELLITUS: Primary | ICD-10-CM

## 2023-08-30 PROCEDURE — 3077F SYST BP >= 140 MM HG: CPT | Performed by: PSYCHIATRY & NEUROLOGY

## 2023-08-30 PROCEDURE — 1159F MED LIST DOCD IN RCRD: CPT | Performed by: PSYCHIATRY & NEUROLOGY

## 2023-08-30 PROCEDURE — 1160F RVW MEDS BY RX/DR IN RCRD: CPT | Performed by: PSYCHIATRY & NEUROLOGY

## 2023-08-30 PROCEDURE — 99214 OFFICE O/P EST MOD 30 MIN: CPT | Performed by: PSYCHIATRY & NEUROLOGY

## 2023-08-30 PROCEDURE — 3078F DIAST BP <80 MM HG: CPT | Performed by: PSYCHIATRY & NEUROLOGY

## 2023-08-30 RX ORDER — DULOXETIN HYDROCHLORIDE 30 MG/1
30 CAPSULE, DELAYED RELEASE ORAL EVERY MORNING
Qty: 30 CAPSULE | Refills: 5 | Status: SHIPPED | OUTPATIENT
Start: 2023-08-30 | End: 2024-08-29

## 2023-08-30 RX ORDER — GABAPENTIN 300 MG/1
600 CAPSULE ORAL 3 TIMES DAILY
Qty: 180 CAPSULE | Refills: 5 | Status: SHIPPED | OUTPATIENT
Start: 2023-08-30 | End: 2024-08-29

## 2023-08-30 NOTE — PROGRESS NOTES
Subjective:    CC: Fazal Berg III is in clinic today for follow up for diabetic polyneuropathy.    HPI:  Initial visit: 5/8/2023: Patient is a 78-year-old male with past medical history of hypertension, hyperlipidemia, type 2 diabetes mellitus, and history of diabetic peripheral neuropathy, who was referred for evaluation and management of diabetic neuropathy, as well as difficulty with balance and gait. He is accompanied by his wife.    He complains of tingling, numbness, pins-and-needles and pain in both his feet that radiates up into his bilateral calves.  He reports that symptoms started years ago and it has progressively become worse.  He denies paresthesia in his hands, though he does experience bilateral hand stiffness. He describes a sensation of tightness in his bilateral lower extremities and pain similar to a sunburn. He has pain and paresthesia throughout the day but somewhat worse at night. He denies insomnia or waking up due to neuropathic pain. He has had type 2 diabetes mellitus for 25 years. He has been taking gabapentin 300 mg 3 times daily for 6 to 8 months, which provides mild relief. He denies any side effects. He has been riding a stationary bike 2 to 2.5 miles daily for approximately 1 month as his balance is poor. He has difficulty picking up his feet. He had a left knee replacement approximately 4 months ago after a fall.    Follow-up: 8/30/2023: He is in clinic for regular follow-up.  Since his initial visit, he has been taking gabapentin 600 3 times daily but reports very minimal decrease in symptoms of neuropathy.  He does report that sometimes, gabapentin does not make him little sleepy and drowsy.  He has been trying to keep his blood sugars under good control.    The following portions of the patient's history were reviewed and updated as of 08/30/2023: allergies, social history, and problem list.       Current Outpatient Medications:     ACCU-CHEK SOFTCLIX LANCETS lancets, 1  "each by Other route 3 (Three) Times a Day. Use as instructed, Disp: 300 each, Rfl: 3    aspirin 81 MG EC tablet, Take 1 tablet by mouth Daily., Disp: , Rfl:     B Complex-C (B-complex with vitamin C) tablet, Take 1 tablet by mouth Daily., Disp: 90 tablet, Rfl: 3    bisacodyl 5 MG EC tablet, Use as directed., Disp: 4 tablet, Rfl: 0    Blood Gluc Meter Disp-Strips device, 1 strip 3 (Three) Times a Day. Patient needs Accu-chek meter and test strips., Disp: 1 each, Rfl: 0    Blood Glucose Calibration (SURECHEK CONTROL SOLUTION) NORMAL liquid, 1 bottle by In Vitro route every 30 (thirty) days., Disp: 3 each, Rfl: 3    Blood Glucose Monitoring Suppl (Accu-Chek Alondra Plus) w/Device kit, for testing sugar daily, Disp: 1 kit, Rfl: 0    Cholecalciferol (VITAMIN D) 2000 UNITS tablet, Take 1 tablet by mouth Daily., Disp: , Rfl:     gabapentin (Neurontin) 300 MG capsule, Take 2 capsules by mouth 3 (Three) Times a Day., Disp: 180 capsule, Rfl: 5    glimepiride (AMARYL) 4 MG tablet, Take 1 tablet by mouth Daily., Disp: 90 tablet, Rfl: 3    glucose blood (Accu-Chek Alondra Plus) test strip, 1 each by Other route As Needed (daily as needed). Use as instructed, Disp: 300 each, Rfl: 3    losartan-hydrochlorothiazide (HYZAAR) 50-12.5 MG per tablet, Take 1 tablet by mouth Daily., Disp: 90 tablet, Rfl: 3    SITagliptin (Januvia) 100 MG tablet, Take 1 tablet by mouth Daily., Disp: 90 tablet, Rfl: 3    DULoxetine (Cymbalta) 30 MG capsule, Take 1 capsule by mouth Every Morning., Disp: 30 capsule, Rfl: 5   Past Medical History:   Diagnosis Date    Allergic rhinitis     Anemia     Arthritis     Asthma     Benign prostatic hypertrophy     Chest pain     \"I'VE SEEN DR. CARRASCO FOR IT A LONG TIME AGO, AND THAT'S WHEN HE DID TESTING\".  STATES HAS NOT MENTIONED IT TO DR. KHAN.  STATES UNSURE OF WHEN LAST TIME WAS\".  PT STATES \"I'M NOT A COMPLAINER\".  STATES COLD WEATHER AND WALKING BRING IT ON.  GETS SHORTNESS OF BREATH OCCASSIONALLY.  MORE " "DESCRIBED AS PRESSURE.     Diverticulitis     ED (erectile dysfunction)     Elevated cholesterol     GERD (gastroesophageal reflux disease)     H/O echocardiogram 2003    Hiatal hernia     [place] (hard of hearing[place]     SPOUSE REPORTS NO USE OF HEARING AIDS    Hx of exercise stress test     SPOUSE REPORTS LAST DONE BY DR ALMANZA AROUND JUNE 2018 AND REPORTED ALL WNL'S AND MD RELEASED PATIENT    Hyperlipidemia     Hypertension     Irregular heart beat     Irritable bowel syndrome     Memory loss     Neuropathy in diabetes     Obstructive sleep apnea     NO CPAP -  states lost weight, and doesn't have it now.    Peripheral neuropathy     Prostate cancer     Short of breath on exertion     Skin cancer     Type 2 diabetes mellitus     Urinary frequency     Vision loss     Vitamin B 12 deficiency     Vitamin D deficiency       Past Surgical History:   Procedure Laterality Date    APPENDECTOMY      BACK SURGERY      Dr. Montenegro - 1997?    CARDIAC CATHETERIZATION  2003?    \"IT WAS OK\"    CHOLECYSTECTOMY WITH INTRAOPERATIVE CHOLANGIOGRAM N/A 05/15/2018    Procedure: CHOLECYSTECTOMY LAPAROSCOPIC INTRAOPERATIVE CHOLANGIOGRAM;  Surgeon: Eric Bran MD;  Location: [facility] OR;  Service: General    COLONOSCOPY N/A 08/08/2018    Procedure: COLONOSCOPY;  Surgeon: Eric Bran MD;  Location: [facility] ENDOSCOPY;  Service: Gastroenterology    COLONOSCOPY N/A 01/27/2023    Procedure: COLONOSCOPY with biopsy;  Surgeon: Ora Mari MD;  Location: [facility] ENDOSCOPY;  Service: Gastroenterology;  Laterality: N/A;    ENDOSCOPY      HAND SURGERY Left     KNEE ARTHROSCOPY Left     MOUTH SURGERY      FULL MOUTH EXTRACTION    PROSTATE SURGERY  07/26/2022    Urolift [place]    REPLACEMENT TOTAL KNEE Left 12/2022    SKIN BIOPSY      SKIN CANCER EXCISION  07/22/2019    skin cancer on head    VASECTOMY  1977      Family History   Problem Relation Age of Onset    Diabetes Mother     Arthritis Mother     Dementia Mother     Diabetes Father  " "   Alcohol abuse Father     Arthritis Other     Gout Other     Heart disease Other     Lung disease Other         Review of Systems  Objective:    /72   Pulse 95   Ht 182.9 cm (72.01\")   Wt 93.9 kg (207 lb)   SpO2 97%   BMI 28.07 kg/mý     Neurology Exam:  General apperance: NAD.     Mental status: Alert, awake and oriented to time place and person.    Language and Speech: No aphasia or dysarthria.    CN II to XII: Intact.    Opthalmoscopic Exam: No papilledema.    Motor:  Right UE muscle strength 5/5. Normal tone.     Left UE muscle strength 5/5. Normal tone.      Right LE muscle strength 5/5. Normal tone.     Left LE muscle strength 5/5. Normal tone.      Sensory: Normal light touch, vibration and pinprick sensation bilaterally.    DTRs: 2+ bilaterally.    Babinski: Negative bilaterally.    Co-ordination: Normal finger-to-nose, heel to shin B/L.    Rhomberg: Negative.    Gait: Somewhat broad-based gait noted.    Cardiovascular: Regular rate and rhythm without murmur, gallop or rub.    Assessment and Plan:  1. Diabetic polyneuropathy associated with type 2 diabetes mellitus  -He has had only minimal improvement after increasing the dose of gabapentin to 600 mg 3 times daily.  He is reporting mild side effects with gabapentin use so I will be increasing it any further instead, I will add Cymbalta 30 mg daily and see if it can improve some of the symptoms of neuropathy further.  Continue with straight glycemic control and I will plan to see him back in clinic in 5 months for follow-up.    - gabapentin (Neurontin) 300 MG capsule; Take 2 capsules by mouth 3 (Three) Times a Day.  Dispense: 180 capsule; Refill: 5       I spent 30 minutes in patient care: Reviewing records prior to the visit, entering orders and documentation and spent more than bronson 50% of this time face-to-face in management, instructions and education regarding above mentioned diagnosis and also on counseling and discussing about taking " medication regularly, possible side effects with medication use, importance of good sleep hygiene, good hydration and regular exercise.    Return in about 5 months (around 1/30/2024).       Note to patient: The 21st Century Cures Act makes medical notes like these available to patients in the interest of transparency. However, be advised this is a medical document. It is intended as peer to peer communication. It is written in medical language and may contain abbreviations or verbiage that are unfamiliar. It may appear blunt or direct. Medical documents are intended to carry relevant information, facts as evident, and the clinical opinion of the physician.

## 2023-09-06 RX ORDER — DULOXETIN HYDROCHLORIDE 30 MG/1
30 CAPSULE, DELAYED RELEASE ORAL EVERY MORNING
Qty: 30 CAPSULE | Refills: 5 | Status: SHIPPED | OUTPATIENT
Start: 2023-09-06 | End: 2024-09-05

## 2023-09-06 NOTE — TELEPHONE ENCOUNTER
Rx Refill Note  Requested Prescriptions     Pending Prescriptions Disp Refills    DULoxetine (Cymbalta) 30 MG capsule 30 capsule 5     Sig: Take 1 capsule by mouth Every Morning.      Last filled: 8/30/2023 6 mos at [facility]. Received fax from [facility] requesting Cymbalta rx. Resending to them.       Last office visit with prescribing clinician: 8/30/2023      Next office visit with prescribing clinician: 12/15/2023     Gregory Jett MA  09/06/23, 11:19 EDT

## 2024-04-15 ENCOUNTER — TELEPHONE (OUTPATIENT)
Dept: UROLOGY | Facility: CLINIC | Age: 79
End: 2024-04-15

## 2024-04-15 NOTE — TELEPHONE ENCOUNTER
Caller: MARCY    Relationship: PT    Best call back number: [phone]     What orders are you requesting (i.e. lab or imaging): LAB FOR PSA    In what timeframe would the patient need to come in: 5/14/24    Where will you receive your lab/imaging services: [facility]    Additional notes: PT HAS APPT SCHEDULED 5/24/24 WITH PSA PRIOR.  THERE IS NO ORDER IN THE CHART

## 2024-04-26 ENCOUNTER — OFFICE VISIT (OUTPATIENT)
Dept: NEUROLOGY | Facility: CLINIC | Age: 79
End: 2024-04-26
Payer: MEDICARE

## 2024-04-26 VITALS
BODY MASS INDEX: 28.07 KG/M2 | DIASTOLIC BLOOD PRESSURE: 68 MMHG | HEART RATE: 67 BPM | OXYGEN SATURATION: 97 % | SYSTOLIC BLOOD PRESSURE: 148 MMHG | HEIGHT: 72 IN

## 2024-04-26 DIAGNOSIS — E11.42 DIABETIC POLYNEUROPATHY ASSOCIATED WITH TYPE 2 DIABETES MELLITUS: ICD-10-CM

## 2024-04-26 DIAGNOSIS — G62.9 NEUROPATHY: ICD-10-CM

## 2024-04-26 DIAGNOSIS — E11.42 TYPE 2 DIABETES, CONTROLLED, WITH PERIPHERAL NEUROPATHY: Primary | ICD-10-CM

## 2024-04-26 RX ORDER — GABAPENTIN 300 MG/1
600 CAPSULE ORAL 4 TIMES DAILY
Qty: 240 CAPSULE | Refills: 6 | Status: SHIPPED | OUTPATIENT
Start: 2024-04-26 | End: 2024-05-26

## 2024-04-26 NOTE — PROGRESS NOTES
Subjective:    CC: Fazal Berg III is in clinic today for follow up for history of diabetic peripheral neuropathy.    HPI:  Initial visit: 5/8/2023: Patient is a 78-year-old male with past medical history of hypertension, hyperlipidemia, type 2 diabetes mellitus, and history of diabetic peripheral neuropathy, who was referred for evaluation and management of diabetic neuropathy, as well as difficulty with balance and gait. He is accompanied by his wife.    He complains of tingling, numbness, pins-and-needles and pain in both his feet that radiates up into his bilateral calves.  He reports that symptoms started years ago and it has progressively become worse.  He denies paresthesia in his hands, though he does experience bilateral hand stiffness. He describes a sensation of tightness in his bilateral lower extremities and pain similar to a sunburn. He has pain and paresthesia throughout the day but somewhat worse at night. He denies insomnia or waking up due to neuropathic pain. He has had type 2 diabetes mellitus for 25 years. He has been taking gabapentin 300 mg 3 times daily for 6 to 8 months, which provides mild relief. He denies any side effects. He has been riding a stationary bike 2 to 2.5 miles daily for approximately 1 month as his balance is poor. He has difficulty picking up his feet. He had a left knee replacement approximately 4 months ago after a fall.    Follow-up: 8/30/2023: He is in clinic for regular follow-up.  Since his initial visit, he has been taking gabapentin 600 3 times daily but reports very minimal decrease in symptoms of neuropathy.  He does report that sometimes, gabapentin does not make him little sleepy and drowsy.  He has been trying to keep his blood sugars under good control.    Follow-up: 4/26/2024: He is in clinic for regular follow-up.  Since his last visit in August 2023, he is taking gabapentin 300 mg in the morning 300 mg at lunch, 600 mg at suppertime and 600 mg at 12  AM.  He did try Cymbalta but it caused him to have side effects he stopped taking it.  He reports that overall symptoms are worse as compared to his last visit.  He reports no side effects with gabapentin.  He reports that initially it was causing him to have side effects but he is tolerating it well now.    The following portions of the patient's history were reviewed and updated as of 04/26/2024: allergies, social history, and problem list.       Current Outpatient Medications:     ACCU-CHEK SOFTCLIX LANCETS lancets, 1 each by Other route 3 (Three) Times a Day. Use as instructed, Disp: 300 each, Rfl: 3    aspirin 81 MG EC tablet, Take 1 tablet by mouth Daily., Disp: , Rfl:     bisacodyl 5 MG EC tablet, Use as directed., Disp: 4 tablet, Rfl: 0    Blood Gluc Meter Disp-Strips device, 1 strip 3 (Three) Times a Day. Patient needs Accu-chek meter and test strips., Disp: 1 each, Rfl: 0    Blood Glucose Calibration (SURECHEK CONTROL SOLUTION) NORMAL liquid, 1 bottle by In Vitro route every 30 (thirty) days., Disp: 3 each, Rfl: 3    Blood Glucose Monitoring Suppl (Accu-Chek Alondra Plus) w/Device kit, for testing sugar daily, Disp: 1 kit, Rfl: 0    Cholecalciferol (VITAMIN D) 2000 UNITS tablet, Take 1 tablet by mouth Daily., Disp: , Rfl:     gabapentin (Neurontin) 300 MG capsule, Take 2 capsules by mouth 4 (Four) Times a Day for 30 days., Disp: 240 capsule, Rfl: 6    glimepiride (AMARYL) 4 MG tablet, Take 1 tablet by mouth Daily., Disp: 90 tablet, Rfl: 3    glucose blood (Accu-Chek Alondra Plus) test strip, 1 each by Other route As Needed (daily as needed). Use as instructed, Disp: 300 each, Rfl: 3    losartan-hydrochlorothiazide (HYZAAR) 50-12.5 MG per tablet, Take 1 tablet by mouth Daily., Disp: 90 tablet, Rfl: 3    SITagliptin (Januvia) 100 MG tablet, Take 1 tablet by mouth Daily., Disp: 90 tablet, Rfl: 3   Past Medical History:   Diagnosis Date    Allergic rhinitis     Anemia     Arthritis     Asthma     Benign prostatic  "hypertrophy     Chest pain     \"I'VE SEEN DR. CARRASCO FOR IT A LONG TIME AGO, AND THAT'S WHEN HE DID TESTING\".  STATES HAS NOT MENTIONED IT TO DR. KHAN.  STATES UNSURE OF WHEN LAST TIME WAS\".  PT STATES \"I'M NOT A COMPLAINER\".  STATES COLD WEATHER AND WALKING BRING IT ON.  GETS SHORTNESS OF BREATH OCCASSIONALLY.  MORE DESCRIBED AS PRESSURE.     Diverticulitis     ED (erectile dysfunction)     Elevated cholesterol     GERD (gastroesophageal reflux disease)     H/O echocardiogram 2003    Hiatal hernia     [place] (hard of hearing)     SPOUSE REPORTS NO USE OF HEARING AIDS    Hx of exercise stress test     SPOUSE REPORTS LAST DONE BY DR ALMANZA AROUND JUNE 2018 AND REPORTED ALL WNL'S AND MD RELEASED PATIENT    Hyperlipidemia     Hypertension     Irregular heart beat     Irritable bowel syndrome     Memory loss     Neuropathy in diabetes     Obstructive sleep apnea     NO CPAP -  states lost weight, and doesn't have it now.    Peripheral neuropathy     Prostate cancer     Short of breath on exertion     Skin cancer     Type 2 diabetes mellitus     Urinary frequency     Vision loss     Vitamin B 12 deficiency     Vitamin D deficiency       Past Surgical History:   Procedure Laterality Date    APPENDECTOMY      BACK SURGERY      Dr. Montenegro - 1997?    CARDIAC CATHETERIZATION  2003?    \"IT WAS OK\"    CHOLECYSTECTOMY WITH INTRAOPERATIVE CHOLANGIOGRAM N/A 05/15/2018    Procedure: CHOLECYSTECTOMY LAPAROSCOPIC INTRAOPERATIVE CHOLANGIOGRAM;  Surgeon: Eric Bran MD;  Location: [facility] OR;  Service: General    COLONOSCOPY N/A 08/08/2018    Procedure: COLONOSCOPY;  Surgeon: Eric Bran MD;  Location: [facility] ENDOSCOPY;  Service: Gastroenterology    COLONOSCOPY N/A 01/27/2023    Procedure: COLONOSCOPY with biopsy;  Surgeon: Ora Mari MD;  Location: [facility] ENDOSCOPY;  Service: Gastroenterology;  Laterality: N/A;    ENDOSCOPY      HAND SURGERY Left     KNEE ARTHROSCOPY Left     MOUTH SURGERY      FULL MOUTH EXTRACTION    " "PROSTATE SURGERY  07/26/2022    Urolift, Maury De La Garza    REPLACEMENT TOTAL KNEE Left 12/2022    SKIN BIOPSY      SKIN CANCER EXCISION  07/22/2019    skin cancer on head    VASECTOMY  1977      Family History   Problem Relation Age of Onset    Diabetes Mother     Arthritis Mother     Dementia Mother     Diabetes Father     Alcohol abuse Father     Arthritis Other     Gout Other     Heart disease Other     Lung disease Other         Review of Systems  Objective:    /68   Pulse 67   Ht 182.9 cm (72.01\")   SpO2 97%   BMI 28.07 kg/m²     Neurology Exam:  General apperance: NAD.     Mental status: Alert, awake and oriented to time place and person.    Language and Speech: No aphasia or dysarthria.    CN II to XII: Intact.    Opthalmoscopic Exam: No papilledema.    Motor:  Right UE muscle strength 5/5. Normal tone.     Left UE muscle strength 5/5. Normal tone.      Right LE muscle strength 5/5. Normal tone.     Left LE muscle strength 5/5. Normal tone.      Sensory: Normal light touch, vibration and pinprick sensation bilaterally.    DTRs: 2+ bilaterally.    Babinski: Negative bilaterally.    Co-ordination: Normal finger-to-nose, heel to shin B/L.    Rhomberg: Negative.    Gait: Normal.    Cardiovascular: Regular rate and rhythm without murmur, gallop or rub.    Assessment and Plan:  1.  Diabetic polyneuropathy associated with type 2 diabetes mellitus.  -He is reporting worsening in symptoms of neuropathy.  Cymbalta caused him to have side effects he stopped taking it.  He reports that gabapentin does help.  Since he does not have any side effects, I will increase the morning and afternoon dose of gabapentin to 600 mg and continue with 600 mg dose at supper and 12 AM.  Based on the response, further treatment options will be discussed with him.  I may add Effexor to gabapentin on next visit.  I will see him back in clinic in 6 months for follow-up.    - gabapentin (Neurontin) 300 MG capsule; Take 2 capsules by " mouth 4 (Four) Times a Day for 30 days.  Dispense: 240 capsule; Refill: 6       I spent 30 minutes in patient care: Reviewing records prior to the visit, entering orders and documentation and spent more than bronson 50% of this time face-to-face in management, instructions and education regarding above mentioned diagnosis and also on counseling and discussing about taking medication regularly, possible side effects with medication use, importance of good sleep hygiene, good hydration and regular exercise.    Return in about 6 months (around 10/26/2024).       Note to patient: The 21st Century Cures Act makes medical notes like these available to patients in the interest of transparency. However, be advised this is a medical document. It is intended as peer to peer communication. It is written in medical language and may contain abbreviations or verbiage that are unfamiliar. It may appear blunt or direct. Medical documents are intended to carry relevant information, facts as evident, and the clinical opinion of the physician.

## 2024-05-30 ENCOUNTER — LAB (OUTPATIENT)
Dept: LAB | Facility: HOSPITAL | Age: 79
End: 2024-05-30
Payer: MEDICARE

## 2024-05-30 DIAGNOSIS — R97.20 ELEVATED PROSTATE SPECIFIC ANTIGEN (PSA): ICD-10-CM

## 2024-05-30 DIAGNOSIS — R97.20 ELEVATED PROSTATE SPECIFIC ANTIGEN (PSA): Primary | ICD-10-CM

## 2024-05-30 PROCEDURE — 84153 ASSAY OF PSA TOTAL: CPT | Performed by: PHYSICIAN ASSISTANT

## 2024-06-05 ENCOUNTER — OFFICE VISIT (OUTPATIENT)
Dept: UROLOGY | Facility: CLINIC | Age: 79
End: 2024-06-05
Payer: MEDICARE

## 2024-06-05 VITALS — WEIGHT: 215 LBS | BODY MASS INDEX: 29.12 KG/M2 | OXYGEN SATURATION: 98 % | HEART RATE: 78 BPM | HEIGHT: 72 IN

## 2024-06-05 DIAGNOSIS — C61 PROSTATE CA: Primary | ICD-10-CM

## 2024-06-05 DIAGNOSIS — N39.41 URGE INCONTINENCE OF URINE: ICD-10-CM

## 2024-06-05 PROCEDURE — 1160F RVW MEDS BY RX/DR IN RCRD: CPT | Performed by: NURSE PRACTITIONER

## 2024-06-05 PROCEDURE — 1159F MED LIST DOCD IN RCRD: CPT | Performed by: NURSE PRACTITIONER

## 2024-06-05 PROCEDURE — 99214 OFFICE O/P EST MOD 30 MIN: CPT | Performed by: NURSE PRACTITIONER

## 2024-06-05 RX ORDER — PANTOPRAZOLE SODIUM 40 MG/1
1 TABLET, DELAYED RELEASE ORAL DAILY
COMMUNITY

## 2024-06-05 RX ORDER — DICLOFENAC SODIUM 75 MG/1
1 TABLET, DELAYED RELEASE ORAL 2 TIMES DAILY
COMMUNITY

## 2024-06-05 RX ORDER — ONDANSETRON HYDROCHLORIDE 8 MG/1
1 TABLET, FILM COATED ORAL 3 TIMES DAILY PRN
COMMUNITY

## 2024-06-05 RX ORDER — PHENAZOPYRIDINE HYDROCHLORIDE 100 MG/1
TABLET, FILM COATED ORAL
COMMUNITY

## 2024-06-05 RX ORDER — CIPROFLOXACIN 500 MG/1
TABLET, FILM COATED ORAL
COMMUNITY

## 2024-06-05 RX ORDER — AMOXICILLIN 500 MG/1
CAPSULE ORAL
COMMUNITY

## 2024-06-05 RX ORDER — TAMSULOSIN HYDROCHLORIDE 0.4 MG/1
1 CAPSULE ORAL DAILY
COMMUNITY

## 2024-06-05 RX ORDER — CHOLESTYRAMINE LIGHT 4 G/5.7G
POWDER, FOR SUSPENSION ORAL
COMMUNITY
Start: 2024-05-16

## 2024-06-05 RX ORDER — PSEUDOEPHEDRINE HCL 30 MG
TABLET ORAL
COMMUNITY

## 2024-06-05 RX ORDER — ALBUTEROL SULFATE 90 UG/1
2 AEROSOL, METERED RESPIRATORY (INHALATION) EVERY 4 HOURS PRN
COMMUNITY

## 2024-06-05 RX ORDER — FINASTERIDE 5 MG/1
TABLET, FILM COATED ORAL
COMMUNITY
End: 2024-06-05 | Stop reason: ALTCHOICE

## 2024-06-05 RX ORDER — NITROFURANTOIN 25; 75 MG/1; MG/1
CAPSULE ORAL
COMMUNITY

## 2024-06-05 RX ORDER — VIBEGRON 75 MG/1
75 TABLET, FILM COATED ORAL DAILY
Qty: 30 TABLET | Refills: 11 | Status: SHIPPED | OUTPATIENT
Start: 2024-06-05

## 2024-06-05 NOTE — PROGRESS NOTES
"       Office Visit Established Male Patient     Patient Name: Fazal Berg III  : 1945   MRN: [id number]     Chief Complaint:   Chief Complaint   Patient presents with    Follow-up     PSA       History of Present Illness: Mr. Fazal Berg III is a 79 y.o. male who presents today for follow up with Prostate ca on AS for many years.   Off finastride for at least 2 years  Had a Urolift and feels it did nothing for him, he does take flomax but not every day        Subjective      Review of System:   As noted in HPI    Past Medical History:   Past Medical History:   Diagnosis Date    Allergic rhinitis     Anemia     Arthritis     Asthma     Benign prostatic hypertrophy     Chest pain     \"I'VE SEEN DR. CARRASCO FOR IT A LONG TIME AGO, AND THAT'S WHEN HE DID TESTING\".  STATES HAS NOT MENTIONED IT TO DR. KHAN.  STATES UNSURE OF WHEN LAST TIME WAS\".  PT STATES \"I'M NOT A COMPLAINER\".  STATES COLD WEATHER AND WALKING BRING IT ON.  GETS SHORTNESS OF BREATH OCCASSIONALLY.  MORE DESCRIBED AS PRESSURE.     Diverticulitis     ED (erectile dysfunction)     Elevated cholesterol     Erectile dysfunction     GERD (gastroesophageal reflux disease)     H/O echocardiogram     Hiatal hernia     [place] (hard of hearing)     SPOUSE REPORTS NO USE OF HEARING AIDS    Hx of exercise stress test     SPOUSE REPORTS LAST DONE BY DR ALMANZA AROUND 2018 AND REPORTED ALL WNL'S AND MD RELEASED PATIENT    Hyperlipidemia     Hypertension     Irregular heart beat     Irritable bowel syndrome     Memory loss     Neuropathy in diabetes     Obstructive sleep apnea     NO CPAP -  states lost weight, and doesn't have it now.    Peripheral neuropathy     Prostate cancer     Short of breath on exertion     Skin cancer     Type 2 diabetes mellitus     Urinary frequency     Urinary incontinence     Vision loss     Vitamin B 12 deficiency     Vitamin D deficiency        Past Surgical History:   Past Surgical History:   Procedure " "Laterality Date    APPENDECTOMY      BACK SURGERY      Dr. Montenegro - 1997?    CARDIAC CATHETERIZATION  2003?    \"IT WAS OK\"    CHOLECYSTECTOMY WITH INTRAOPERATIVE CHOLANGIOGRAM N/A 05/15/2018    Procedure: CHOLECYSTECTOMY LAPAROSCOPIC INTRAOPERATIVE CHOLANGIOGRAM;  Surgeon: Eric Bran MD;  Location: [facility] OR;  Service: General    COLONOSCOPY N/A 08/08/2018    Procedure: COLONOSCOPY;  Surgeon: Eric Bran MD;  Location: [facility] ENDOSCOPY;  Service: Gastroenterology    COLONOSCOPY N/A 01/27/2023    Procedure: COLONOSCOPY with biopsy;  Surgeon: Ora Mari MD;  Location: [facility] ENDOSCOPY;  Service: Gastroenterology;  Laterality: N/A;    ENDOSCOPY      HAND SURGERY Left     KNEE ARTHROSCOPY Left     MOUTH SURGERY      FULL MOUTH EXTRACTION    PROSTATE SURGERY  07/26/2022    Urolift, Maury Frederic    REPLACEMENT TOTAL KNEE Left 12/2022    SKIN BIOPSY      SKIN CANCER EXCISION  07/22/2019    skin cancer on head    VASECTOMY  1977       Family History:   Family History   Problem Relation Age of Onset    Diabetes Mother     Arthritis Mother     Dementia Mother     Diabetes Father     Alcohol abuse Father     Hypertension Father     Arthritis Other     Gout Other     Heart disease Other     Lung disease Other        Social History:   Social History     Socioeconomic History    Marital status:    Tobacco Use    Smoking status: Never     Passive exposure: Never    Smokeless tobacco: Never   Vaping Use    Vaping status: Never Used   Substance and Sexual Activity    Alcohol use: No    Drug use: No    Sexual activity: Not Currently     Partners: Female     Birth control/protection: None     Comment: N       Medications:     Current Outpatient Medications:     ACCU-CHEK SOFTCLIX LANCETS lancets, 1 each by Other route 3 (Three) Times a Day. Use as instructed, Disp: 300 each, Rfl: 3    aspirin 81 MG EC tablet, Take 1 tablet by mouth Daily., Disp: , Rfl:     bisacodyl 5 MG EC tablet, Use as directed., Disp: 4 " tablet, Rfl: 0    Blood Gluc Meter Disp-Strips device, 1 strip 3 (Three) Times a Day. Patient needs Accu-chek meter and test strips., Disp: 1 each, Rfl: 0    Blood Glucose Calibration (SURECHEK CONTROL SOLUTION) NORMAL liquid, 1 bottle by In Vitro route every 30 (thirty) days., Disp: 3 each, Rfl: 3    Blood Glucose Monitoring Suppl (Accu-Chek Alondra Plus) w/Device kit, for testing sugar daily, Disp: 1 kit, Rfl: 0    Cholecalciferol (VITAMIN D) 2000 UNITS tablet, Take 1 tablet by mouth Daily., Disp: , Rfl:     cholestyramine light 4 g packet, DISSOLVE 1 PACKET IN GLASS OF WATER AND TAKE BY MOUTH ONCE DAILY, Disp: , Rfl:     ciprofloxacin (CIPRO) 500 MG tablet, TAKE 1 TABLET BY MOUTH EVERY 12 HOURS UNTIL GONE, Disp: , Rfl:     docusate sodium 100 MG capsule, TAKE 1 OR 2 CAPSULES BY MOUTH EVERY DAY AS NEEDED, Disp: , Rfl:     glimepiride (AMARYL) 4 MG tablet, Take 1 tablet by mouth Daily., Disp: 90 tablet, Rfl: 3    glucose blood (Accu-Chek Alondra Plus) test strip, 1 each by Other route As Needed (daily as needed). Use as instructed, Disp: 300 each, Rfl: 3    losartan-hydrochlorothiazide (HYZAAR) 50-12.5 MG per tablet, Take 1 tablet by mouth Daily., Disp: 90 tablet, Rfl: 3    metFORMIN (GLUCOPHAGE) 1000 MG tablet, Take 1 tablet by mouth 2 (Two) Times a Day., Disp: , Rfl:     pantoprazole (PROTONIX) 40 MG EC tablet, Take 1 tablet by mouth Daily., Disp: , Rfl:     phenazopyridine (PYRIDIUM) 100 MG tablet, TAKE 1 TABLET BY MOUTH THREE TIMES A DAY AS NEEDED FOR BLADDER SPASMS (URINARY BURNING AFTER PROCEDURE) FOR UP TO 3 DAYS, Disp: , Rfl:     SITagliptin (Januvia) 100 MG tablet, Take 1 tablet by mouth Daily., Disp: 90 tablet, Rfl: 3    albuterol sulfate  (90 Base) MCG/ACT inhaler, Inhale 2 puffs Every 4 (Four) Hours As Needed. (Patient not taking: Reported on 6/5/2024), Disp: , Rfl:     amoxicillin (AMOXIL) 500 MG capsule, TAKE 1 CAPSULE BY MOUTH THREE TIMES A DAY UNTIL GONE (Patient not taking: Reported on  "6/5/2024), Disp: , Rfl:     diclofenac (VOLTAREN) 75 MG EC tablet, Take 1 tablet by mouth 2 (Two) Times a Day. (Patient not taking: Reported on 6/5/2024), Disp: , Rfl:     gabapentin (Neurontin) 300 MG capsule, Take 2 capsules by mouth 4 (Four) Times a Day for 30 days., Disp: 240 capsule, Rfl: 6    nitrofurantoin, macrocrystal-monohydrate, (MACROBID) 100 MG capsule, TAKE 1 CAPSULE BY MOUTH 2 TIMES A DAY FOR 7 DAYS, Disp: , Rfl:     ondansetron (ZOFRAN) 8 MG tablet, Take 1 tablet by mouth 3 (Three) Times a Day As Needed. (Patient not taking: Reported on 6/5/2024), Disp: , Rfl:     tamsulosin (FLOMAX) 0.4 MG capsule 24 hr capsule, Take 1 capsule by mouth Daily. (Patient not taking: Reported on 6/5/2024), Disp: , Rfl:     Vibegron (Gemtesa) 75 MG tablet, Take 1 tablet by mouth Daily., Disp: 30 tablet, Rfl: 11    Allergies:   Allergies   Allergen Reactions    Statins Myalgia       Objective     Physical Exam:   Vital Signs:   Vitals:    06/05/24 1356   Pulse: 78   SpO2: 98%   Weight: 97.5 kg (215 lb)   Height: 182.9 cm (72\")     Body mass index is 29.16 kg/m².     Physical Exam  Vitals and nursing note reviewed.   Constitutional:       General: He is not in acute distress.     Appearance: Normal appearance. He is normal weight. He is not ill-appearing.   Pulmonary:      Effort: Pulmonary effort is normal. No respiratory distress.   Skin:     General: Skin is warm and dry.   Neurological:      General: No focal deficit present.      Mental Status: He is alert and oriented to person, place, and time.   Psychiatric:         Mood and Affect: Mood normal.         Behavior: Behavior normal.        Labs  Brief Urine Lab Results  (Last result in the past 365 days)        Color   Clarity   Blood   Leuk Est   Nitrite   Protein   CREAT   Urine HCG        06/27/23 1401 Yellow   Clear   Negative   Negative   Negative   Negative                   Lab Results   Component Value Date    GLUCOSE 152 (H) 05/15/2023    CALCIUM 10.8 (H) " 05/15/2023     05/15/2023    K 4.9 05/15/2023    CO2 28.4 05/15/2023     05/15/2023    BUN 30 (H) 05/15/2023    CREATININE 1.21 05/15/2023    EGFRIFAFRI 71 08/05/2021    EGFRIFNONA 59 (L) 08/05/2021    BCR 24.8 05/15/2023    ANIONGAP 5.0 08/16/2016       Lab Results   Component Value Date    WBC 5.61 05/15/2023    HGB 13.4 05/15/2023    HCT 38.8 05/15/2023    MCV 91.7 05/15/2023     05/15/2023            Radiographic Studies  No Images in the past 120 days found..    I have reviewed the above labs and imaging.       IPSS Questionnaire (AUA-7):  Over the past month…    1)  Incomplete Emptying:       How often have you had a sensation of not emptying you had the sensation of not emptying your bladder completely after you finished urinating?  2 - Less than half the time   2)  Frequency:       How often have you had the urinate again less than two hours after you finished urinating?  3 - About half the time   3)  Intermittency:       How often have you found you stopped and started again several times when you urinated?   3 - About half the time   4) Urgency:      How often have you found it difficult to postpone urination?  2 - Less than half the time   5) Weak Stream:      How often have you had a weak urinary stream?  5 - Almost always   6) Straining:       How often have you had to push or strain to begin urination?  1 - Less than 1 time in 5   7) Nocturia:      How many times did you most typically get up to urinate from the time you went to bed at night until the time you got up in the morning?  4 - 4 times   Total Score:  15   The International Prostate Symptom Score (IPSS) is used to screen, diagnose, track symptoms of benign prostatic hyperplasia (BPH).   0-7 (Mild Symptoms) 8-19 (Moderate) 20-35 (Severe)   Quality of Life (QoL):  If you were to spend the rest of your life with your urinary condition just the way it is now, how would you feel about that? 4-Mostly Dissatisfied   Urine Leakage  (Incontinence) 2-Mild (More than a few drops a day, 1-2 pads/day)         Assessment / Plan      Assessment/Plan:   Diagnoses and all orders for this visit:    1. Prostate CA (Primary)  -     MRI Prostate With & Without Contrast; Future    2. Urge incontinence of urine  -     Vibegron (Gemtesa) 75 MG tablet; Take 1 tablet by mouth Daily.  Dispense: 30 tablet; Refill: 11    79-year-old male with a history of low-grade prostate cancer diagnosed sometime prior to 2016 has been doing active surveillance for many years with just repeating PSA.  He is no longer taking finasteride at least 2 years, most recent PSA was 3.30 we discussed with no recent biopsy or MRI I would recommend patient follow-up with MRI for further evaluation of prostate.  We also reviewed patient's urinary symptoms and most bothersome tends to be urge incontinence will do a trial of Gemtesa to see if this improves symptoms if not consider discussing cystoscopy with Dr. De La Garza at next visit.    Obtain prostate MRI and follow-up with Dr. De La Garza         Repeat PSA 1 year  2.  Start Gemtesa 75 mg daily    Follow Up:   Return in about 4 weeks (around 7/3/2024) for follow up Penny De La Garza prior to next visit.    ANTONI ePrez,NP-C  [facility]

## 2024-06-20 ENCOUNTER — HOSPITAL ENCOUNTER (OUTPATIENT)
Dept: MRI IMAGING | Facility: HOSPITAL | Age: 79
Discharge: HOME | End: 2024-06-20
Admitting: NURSE PRACTITIONER
Payer: MEDICARE

## 2024-06-20 DIAGNOSIS — C61 PROSTATE CA: ICD-10-CM

## 2024-06-20 PROCEDURE — 72197 MRI PELVIS W/O & W/DYE: CPT

## 2024-06-20 PROCEDURE — A9577 INJ MULTIHANCE: HCPCS | Performed by: NURSE PRACTITIONER

## 2024-06-20 PROCEDURE — 0 GADOBENATE DIMEGLUMINE 529 MG/ML SOLUTION: Performed by: NURSE PRACTITIONER

## 2024-06-20 RX ADMIN — GADOBENATE DIMEGLUMINE 20 ML: 529 INJECTION, SOLUTION INTRAVENOUS at 10:47

## 2024-06-24 LAB — CREAT BLDA-MCNC: 1.5 MG/DL (ref 0.6–1.3)

## 2024-07-11 ENCOUNTER — OFFICE VISIT (OUTPATIENT)
Dept: UROLOGY | Facility: CLINIC | Age: 79
End: 2024-07-11
Payer: MEDICARE

## 2024-07-11 VITALS
SYSTOLIC BLOOD PRESSURE: 168 MMHG | HEIGHT: 72 IN | DIASTOLIC BLOOD PRESSURE: 92 MMHG | WEIGHT: 220 LBS | BODY MASS INDEX: 29.8 KG/M2

## 2024-07-11 DIAGNOSIS — N39.41 URGE INCONTINENCE OF URINE: ICD-10-CM

## 2024-07-11 DIAGNOSIS — C61 PROSTATE CA: Primary | ICD-10-CM

## 2024-07-11 NOTE — PROGRESS NOTES
"CC  Prostate cancer  Lower urinary tract symptoms    HPI  Mr. Berg is a 79 y.o. male with history below in assessment, who presents for follow up.     At this visit patient here to discuss his MRI for active surveillance    Past Medical History:   Diagnosis Date    Allergic rhinitis     Anemia     Arthritis     Asthma     Benign prostatic hypertrophy     Chest pain     \"I'VE SEEN DR. CARRASCO FOR IT A LONG TIME AGO, AND THAT'S WHEN HE DID TESTING\".  STATES HAS NOT MENTIONED IT TO DR. KHAN.  STATES UNSURE OF WHEN LAST TIME WAS\".  PT STATES \"I'M NOT A COMPLAINER\".  STATES COLD WEATHER AND WALKING BRING IT ON.  GETS SHORTNESS OF BREATH OCCASSIONALLY.  MORE DESCRIBED AS PRESSURE.     Diverticulitis     ED (erectile dysfunction)     Elevated cholesterol     Erectile dysfunction     GERD (gastroesophageal reflux disease)     H/O echocardiogram 2003    Hiatal hernia     [place] (hard of hearing)     SPOUSE REPORTS NO USE OF HEARING AIDS    Hx of exercise stress test     SPOUSE REPORTS LAST DONE BY DR ALMANZA AROUND JUNE 2018 AND REPORTED ALL WNL'S AND MD RELEASED PATIENT    Hyperlipidemia     Hypertension     Irregular heart beat     Irritable bowel syndrome     Memory loss     Neuropathy in diabetes     Obstructive sleep apnea     NO CPAP -  states lost weight, and doesn't have it now.    Peripheral neuropathy     Prostate cancer     Short of breath on exertion     Skin cancer     Type 2 diabetes mellitus     Urinary frequency     Urinary incontinence     Vision loss     Vitamin B 12 deficiency     Vitamin D deficiency        Past Surgical History:   Procedure Laterality Date    APPENDECTOMY      BACK SURGERY      Dr. Montenegro - 1997?    CARDIAC CATHETERIZATION  2003?    \"IT WAS OK\"    CHOLECYSTECTOMY WITH INTRAOPERATIVE CHOLANGIOGRAM N/A 05/15/2018    Procedure: CHOLECYSTECTOMY LAPAROSCOPIC INTRAOPERATIVE CHOLANGIOGRAM;  Surgeon: Eric Bran MD;  Location: [facility];  Service: General    COLONOSCOPY N/A 08/08/2018    " Procedure: COLONOSCOPY;  Surgeon: Eric Bran MD;  Location: [facility] ENDOSCOPY;  Service: Gastroenterology    COLONOSCOPY N/A 01/27/2023    Procedure: COLONOSCOPY with biopsy;  Surgeon: Ora Mari MD;  Location: [facility] ENDOSCOPY;  Service: Gastroenterology;  Laterality: N/A;    ENDOSCOPY      HAND SURGERY Left     KNEE ARTHROSCOPY Left     MOUTH SURGERY      FULL MOUTH EXTRACTION    PROSTATE SURGERY  07/26/2022    Urolift, Maury Frederic    REPLACEMENT TOTAL KNEE Left 12/2022    SKIN BIOPSY      SKIN CANCER EXCISION  07/22/2019    skin cancer on head    VASECTOMY  1977         Current Outpatient Medications:     ACCU-CHEK SOFTCLIX LANCETS lancets, 1 each by Other route 3 (Three) Times a Day. Use as instructed, Disp: 300 each, Rfl: 3    albuterol sulfate  (90 Base) MCG/ACT inhaler, Inhale 2 puffs Every 4 (Four) Hours As Needed. (Patient not taking: Reported on 6/5/2024), Disp: , Rfl:     amoxicillin (AMOXIL) 500 MG capsule, TAKE 1 CAPSULE BY MOUTH THREE TIMES A DAY UNTIL GONE (Patient not taking: Reported on 6/5/2024), Disp: , Rfl:     aspirin 81 MG EC tablet, Take 1 tablet by mouth Daily., Disp: , Rfl:     bisacodyl 5 MG EC tablet, Use as directed., Disp: 4 tablet, Rfl: 0    Blood Gluc Meter Disp-Strips device, 1 strip 3 (Three) Times a Day. Patient needs Accu-chek meter and test strips., Disp: 1 each, Rfl: 0    Blood Glucose Calibration (SURECHEK CONTROL SOLUTION) NORMAL liquid, 1 bottle by In Vitro route every 30 (thirty) days., Disp: 3 each, Rfl: 3    Blood Glucose Monitoring Suppl (Accu-Chek Alondra Plus) w/Device kit, for testing sugar daily, Disp: 1 kit, Rfl: 0    Cholecalciferol (VITAMIN D) 2000 UNITS tablet, Take 1 tablet by mouth Daily., Disp: , Rfl:     cholestyramine light 4 g packet, DISSOLVE 1 PACKET IN GLASS OF WATER AND TAKE BY MOUTH ONCE DAILY, Disp: , Rfl:     ciprofloxacin (CIPRO) 500 MG tablet, TAKE 1 TABLET BY MOUTH EVERY 12 HOURS UNTIL GONE, Disp: , Rfl:     diclofenac (VOLTAREN)  75 MG EC tablet, Take 1 tablet by mouth 2 (Two) Times a Day. (Patient not taking: Reported on 6/5/2024), Disp: , Rfl:     docusate sodium 100 MG capsule, TAKE 1 OR 2 CAPSULES BY MOUTH EVERY DAY AS NEEDED, Disp: , Rfl:     gabapentin (Neurontin) 300 MG capsule, Take 2 capsules by mouth 4 (Four) Times a Day for 30 days., Disp: 240 capsule, Rfl: 6    glimepiride (AMARYL) 4 MG tablet, Take 1 tablet by mouth Daily., Disp: 90 tablet, Rfl: 3    glucose blood (Accu-Chek Alondra Plus) test strip, 1 each by Other route As Needed (daily as needed). Use as instructed, Disp: 300 each, Rfl: 3    losartan-hydrochlorothiazide (HYZAAR) 50-12.5 MG per tablet, Take 1 tablet by mouth Daily., Disp: 90 tablet, Rfl: 3    metFORMIN (GLUCOPHAGE) 1000 MG tablet, Take 1 tablet by mouth 2 (Two) Times a Day., Disp: , Rfl:     nitrofurantoin, macrocrystal-monohydrate, (MACROBID) 100 MG capsule, TAKE 1 CAPSULE BY MOUTH 2 TIMES A DAY FOR 7 DAYS, Disp: , Rfl:     ondansetron (ZOFRAN) 8 MG tablet, Take 1 tablet by mouth 3 (Three) Times a Day As Needed. (Patient not taking: Reported on 6/5/2024), Disp: , Rfl:     pantoprazole (PROTONIX) 40 MG EC tablet, Take 1 tablet by mouth Daily., Disp: , Rfl:     phenazopyridine (PYRIDIUM) 100 MG tablet, TAKE 1 TABLET BY MOUTH THREE TIMES A DAY AS NEEDED FOR BLADDER SPASMS (URINARY BURNING AFTER PROCEDURE) FOR UP TO 3 DAYS, Disp: , Rfl:     SITagliptin (Januvia) 100 MG tablet, Take 1 tablet by mouth Daily., Disp: 90 tablet, Rfl: 3    Vibegron (Gemtesa) 75 MG tablet, Take 1 tablet by mouth Daily., Disp: 30 tablet, Rfl: 11     Physical Exam  There were no vitals taken for this visit.    Labs  Brief Urine Lab Results       None            Lab Results   Component Value Date    GLUCOSE 152 (H) 05/15/2023    CALCIUM 10.8 (H) 05/15/2023     05/15/2023    K 4.9 05/15/2023    CO2 28.4 05/15/2023     05/15/2023    BUN 30 (H) 05/15/2023    CREATININE 1.50 (H) 06/20/2024    EGFRIFAFRI 71 08/05/2021    EGFRIFNONA 59  (L) 08/05/2021    BCR 24.8 05/15/2023    ANIONGAP 5.0 08/16/2016       Lab Results   Component Value Date    WBC 5.61 05/15/2023    HGB 13.4 05/15/2023    HCT 38.8 05/15/2023    MCV 91.7 05/15/2023     05/15/2023            Lab Results   Component Value Date    PSA 3.330 05/30/2024    PSA 2.330 06/27/2023    PSA 3.050 05/15/2023           Radiographic Studies  MRI Prostate With & Without Contrast  Result Date: 6/21/2024  Impression: 1. No PI-RADS 3 or greater lesion. 2. Prostatomegaly related to BPH with prior UroLift. 3. Prostate capsule intact. 4. No pelvic lymphadenopathy. Overall PI-RADS 2 exam. Electronically Signed: Reginald Faith MD  6/21/2024 1:20 PM EDT  Workstation ID: [id number]      I have reviewed above labs and imaging.     Assessment  79 y.o. male with history of low-grade Kaveh 6 prostate cancer diagnosed sometime prior to 2016 has been doing active surveillance for many years with just repeating PSA.  He is no longer taking finasteride at least 2 years, most recent PSA was 3.30.  BPH with Urolift in 2022.  Continued LUTS worsening with mostly urgency frequency, urge incontinence. IPSS 15. Off alpha blockers and finasteride, started on Vibegron at last visit. Doesn't think it helped.     MRI very reassuring. He says he is now bothered enough by his urinary symptoms to want repeat workup with cystoscopy and possibly urodynamics, which I offered him.    Plan  1.  Follow-up in 1 year with [facility] with PSA prior for active surveillance.

## 2024-09-25 ENCOUNTER — OFFICE VISIT (OUTPATIENT)
Dept: SURGERY | Facility: CLINIC | Age: 79
End: 2024-09-25
Payer: MEDICARE

## 2024-09-25 VITALS
BODY MASS INDEX: 28.85 KG/M2 | OXYGEN SATURATION: 99 % | TEMPERATURE: 98.2 F | DIASTOLIC BLOOD PRESSURE: 70 MMHG | WEIGHT: 213 LBS | HEIGHT: 72 IN | HEART RATE: 71 BPM | SYSTOLIC BLOOD PRESSURE: 132 MMHG

## 2024-09-25 DIAGNOSIS — D49.2 SKIN NEOPLASM: Primary | ICD-10-CM

## 2024-09-25 PROCEDURE — 1160F RVW MEDS BY RX/DR IN RCRD: CPT | Performed by: SURGERY

## 2024-09-25 PROCEDURE — 1159F MED LIST DOCD IN RCRD: CPT | Performed by: SURGERY

## 2024-09-25 PROCEDURE — 3075F SYST BP GE 130 - 139MM HG: CPT | Performed by: SURGERY

## 2024-09-25 PROCEDURE — 3078F DIAST BP <80 MM HG: CPT | Performed by: SURGERY

## 2024-09-25 PROCEDURE — 99213 OFFICE O/P EST LOW 20 MIN: CPT | Performed by: SURGERY

## 2024-09-25 RX ORDER — FUROSEMIDE 40 MG
1 TABLET ORAL DAILY
COMMUNITY
Start: 2024-08-27

## 2024-09-25 RX ORDER — TRAMADOL HYDROCHLORIDE 50 MG/1
1 TABLET ORAL 3 TIMES DAILY
COMMUNITY
Start: 2024-09-16

## 2024-09-25 RX ORDER — AMLODIPINE BESYLATE 10 MG/1
1 TABLET ORAL DAILY
COMMUNITY
Start: 2024-09-06

## 2024-09-30 NOTE — PROGRESS NOTES
Location:right shoulder    Procedure:  excision 5 cm skin cancer right shoulder with layered closure    I recommend excision. Procedure and the risks and benefits were explained including bleeding and infection. The patient understands these and wishes to proceed.     The patient was brought to the procedure room. Consent and time out were performed. The area was prepped and draped in the usual fashion. 1% lidocaine with epinephrine was infused locally. An ellyptical incision was made around the lesion. Full thickness excision was performed. The lesion size was 5 cm. The wound was closed in layers with interrupted simple vicryl and Nylon for the skin. Wound closure size was 5.5 cm. There were no complications and the patient tolerated the procedure well. Hemostasis was well controlled with pressure and there was minimal blood loss. Wound instructions were given.

## 2024-10-04 ENCOUNTER — PROCEDURE VISIT (OUTPATIENT)
Dept: SURGERY | Facility: CLINIC | Age: 79
End: 2024-10-04
Payer: MEDICARE

## 2024-10-04 VITALS
HEART RATE: 116 BPM | OXYGEN SATURATION: 98 % | HEIGHT: 72 IN | WEIGHT: 213 LBS | TEMPERATURE: 98.2 F | BODY MASS INDEX: 28.85 KG/M2

## 2024-10-04 DIAGNOSIS — D49.2 SKIN NEOPLASM: Primary | ICD-10-CM

## 2024-10-04 RX ORDER — FINASTERIDE 5 MG/1
TABLET, FILM COATED ORAL
COMMUNITY

## 2024-10-04 RX ORDER — TAMSULOSIN HYDROCHLORIDE 0.4 MG/1
CAPSULE ORAL
COMMUNITY

## 2024-10-08 LAB — REF LAB TEST METHOD: NORMAL

## 2024-10-09 NOTE — PROGRESS NOTES
"Patient: Fazal Berg III    YOB: 1945    Date: 10/14/2024    Primary Care Provider: Chucky Parrish MD    Chief Complaint   Patient presents with    Post-op Follow-up     Excision        History of present illness:  I saw the patient in the office today as a followup from their recent right shoulder excision.  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   They state that they have done well post-operatively and are having no complaints.    Vital Signs:   Vitals:    10/14/24 1359   Pulse: 80   Temp: 97.5 °F (36.4 °C)   SpO2: 99%   Weight: 96.6 kg (213 lb)   Height: 182.9 cm (72.01\")       Physical Exam:    Skin-wound looks good, no redness or drainage     Assessment / Plan :    1. Postoperative visit      Patient reassured, complete excision of cancer.  Follow-up as needed.    Electronically signed by Ora Mari MD  10/14/24                "

## 2024-10-14 ENCOUNTER — OFFICE VISIT (OUTPATIENT)
Dept: SURGERY | Facility: CLINIC | Age: 79
End: 2024-10-14
Payer: MEDICARE

## 2024-10-14 VITALS
TEMPERATURE: 97.5 F | BODY MASS INDEX: 28.85 KG/M2 | WEIGHT: 213 LBS | HEIGHT: 72 IN | OXYGEN SATURATION: 99 % | HEART RATE: 80 BPM

## 2024-10-14 DIAGNOSIS — Z48.89 POSTOPERATIVE VISIT: Primary | ICD-10-CM

## 2024-10-14 PROCEDURE — 99024 POSTOP FOLLOW-UP VISIT: CPT | Performed by: SURGERY

## 2024-10-14 PROCEDURE — 1160F RVW MEDS BY RX/DR IN RCRD: CPT | Performed by: SURGERY

## 2024-10-14 PROCEDURE — 1159F MED LIST DOCD IN RCRD: CPT | Performed by: SURGERY

## 2024-11-08 ENCOUNTER — TELEPHONE (OUTPATIENT)
Dept: SURGERY | Facility: CLINIC | Age: 79
End: 2024-11-08
Payer: MEDICARE

## 2024-11-08 NOTE — TELEPHONE ENCOUNTER
Patient's wife called to say the incision on his back done on 10/04/2024 started draining a few days ago. The site is red and she thinks she can see a stitch protruding. They are leaving Sunday for 3 weeks. Please advise.

## 2025-07-22 ENCOUNTER — TELEPHONE (OUTPATIENT)
Dept: UROLOGY | Facility: CLINIC | Age: 80
End: 2025-07-22
Payer: MEDICARE

## 2025-07-22 NOTE — TELEPHONE ENCOUNTER
Called patient and LVM to return my call.  Patient needs to get PSA done before his appointment.\    Nathalie LLOYD FOR HUB TO RELAY MESSAGE

## 2025-08-06 ENCOUNTER — TELEPHONE (OUTPATIENT)
Dept: UROLOGY | Facility: CLINIC | Age: 80
End: 2025-08-06
Payer: MEDICARE

## 2025-08-06 DIAGNOSIS — R97.20 ELEVATED PROSTATE SPECIFIC ANTIGEN (PSA): Primary | ICD-10-CM

## 2025-08-14 ENCOUNTER — OFFICE VISIT (OUTPATIENT)
Dept: UROLOGY | Facility: CLINIC | Age: 80
End: 2025-08-14
Payer: MEDICARE

## 2025-08-14 VITALS
SYSTOLIC BLOOD PRESSURE: 110 MMHG | HEART RATE: 77 BPM | TEMPERATURE: 97.6 F | HEIGHT: 72 IN | BODY MASS INDEX: 29.12 KG/M2 | OXYGEN SATURATION: 97 % | DIASTOLIC BLOOD PRESSURE: 72 MMHG | RESPIRATION RATE: 18 BRPM | WEIGHT: 215 LBS

## 2025-08-14 DIAGNOSIS — N40.1 BENIGN LOCALIZED PROSTATIC HYPERPLASIA WITH LOWER URINARY TRACT SYMPTOMS (LUTS): ICD-10-CM

## 2025-08-14 DIAGNOSIS — C61 PROSTATE CA: Primary | ICD-10-CM

## 2025-08-14 RX ORDER — GABAPENTIN 600 MG/1
1 TABLET ORAL EVERY 6 HOURS
COMMUNITY
Start: 2025-08-05

## 2025-08-14 RX ORDER — GLIMEPIRIDE 4 MG/1
4 TABLET ORAL DAILY
COMMUNITY

## 2025-08-14 RX ORDER — DUTASTERIDE 0.5 MG/1
1 CAPSULE, LIQUID FILLED ORAL DAILY
COMMUNITY
Start: 2025-08-05

## 2025-08-14 RX ORDER — CHOLESTYRAMINE 4 G/5.5G
POWDER, FOR SUSPENSION ORAL
COMMUNITY
Start: 2025-08-12

## (undated) DEVICE — SOL IRR SALINE 0.9% 100ML STRL

## (undated) DEVICE — RESERVOIR,SUCTION,100CC,SILICONE: Brand: MEDLINE

## (undated) DEVICE — UNDYED MONOFILAMENT (POLYDIOXANONE), ABSORBABLE SYNTHETIC SURGICAL SUTURE: Brand: PDS

## (undated) DEVICE — DISPOSABLE MONOPOLAR ENDOSCOPIC CORD 10 FT. (3M): Brand: KIRWAN

## (undated) DEVICE — PATIENT RETURN ELECTRODE, SINGLE-USE, CONTACT QUALITY MONITORING, ADULT, WITH 9FT CORD, FOR PATIENTS WEIGING OVER 33LBS. (15KG): Brand: MEGADYNE

## (undated) DEVICE — SOL NACL 0.9PCT 1000ML

## (undated) DEVICE — Device

## (undated) DEVICE — 2, DISPOSABLE SUCTION/IRRIGATOR WITHOUT DISPOSABLE TIP: Brand: STRYKEFLOW

## (undated) DEVICE — CUFF SCD HEMOFORCE SEQ CALF STD MD

## (undated) DEVICE — GLV SURG SENSICARE W/ALOE PF LF 7.5 STRL

## (undated) DEVICE — KT CATH CHOLANGIOGRA PERC W/BALLO

## (undated) DEVICE — ENDOGATOR AUXILIARY WATER JET CONNECTOR: Brand: ENDOGATOR

## (undated) DEVICE — CLAVICLE STRAP: Brand: DEROYAL

## (undated) DEVICE — KT ORCA VLV SXN AIR/H2O W/SEAL 1P/U STRL

## (undated) DEVICE — 3M™ STERI-STRIP™ REINFORCED ADHESIVE SKIN CLOSURES, R1547, 1/2 IN X 4 IN (12 MM X 100 MM), 6 STRIPS/ENVELOPE: Brand: 3M™ STERI-STRIP™

## (undated) DEVICE — DRN JP FLT NO TROC SIL FUL/PERF 7MM

## (undated) DEVICE — ENDOSCOPY PORT CONNECTOR FOR OLYMPUS® SCOPES: Brand: ERBE

## (undated) DEVICE — HYBRID TUBING/CAP SET FOR OLYMPUS® SCOPES: Brand: ERBE

## (undated) DEVICE — ENDOPATH XCEL BLADELESS TROCARS WITH STABILITY SLEEVES: Brand: ENDOPATH XCEL

## (undated) DEVICE — VLV SXN AIR/H2O ORCAPOD3 1P/U STRL

## (undated) DEVICE — QUICK CATCH IN-LINE SUCTION POLYP TRAP IS USED FOR SUCTION RETRIEVAL OF ENDOSCOPICALLY REMOVED POLYPS.

## (undated) DEVICE — MEDI-VAC NON-CONDUCTIVE SUCTION TUBING: Brand: CARDINAL HEALTH

## (undated) DEVICE — SNAR POLYP SENSATION STDOVL 27 240 BX40

## (undated) DEVICE — ENDOPATH XCEL UNIVERSAL TROCAR STABLILITY SLEEVES: Brand: ENDOPATH XCEL

## (undated) DEVICE — LUBE JELLY PK/2.75GM STRL BX/144

## (undated) DEVICE — MONOPOLAR METZENBAUM SCISSOR, MINI BLADE TIP, DISPOSABLE: Brand: MONOPOLAR METZENBAUM SCISSOR, MINI BLADE TIP, DISPOSABLE

## (undated) DEVICE — RICH GENERAL LAPAROSCOPY: Brand: MEDLINE INDUSTRIES, INC.

## (undated) DEVICE — ELECTRD LAP L HK 5MM 33CM STRL DISP

## (undated) DEVICE — SUT SILK 2/0 FS BLK 18IN 685G

## (undated) DEVICE — FRCP BIOP RADLJAW4 HOT 2.2X240 BX40